# Patient Record
Sex: FEMALE | Race: BLACK OR AFRICAN AMERICAN | NOT HISPANIC OR LATINO | Employment: STUDENT | URBAN - METROPOLITAN AREA
[De-identification: names, ages, dates, MRNs, and addresses within clinical notes are randomized per-mention and may not be internally consistent; named-entity substitution may affect disease eponyms.]

---
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
## 2018-01-04 NOTE — PROGRESS NOTES
Visit:7    Diagnosis:   Encounter Diagnosis   Name Primary?    Chronic pain of both knees        Physician: Marylou Barrios MD  Treatment Orders: eval and treat  Occupation: sophomore in college at [employer]      Subjective  Pt states she feeling fine with mild pain on L knee.     Objective          Lower Extremity Strength  Right LE  Left LE    Knee extension: 5/5 Knee extension: 5/5   Knee flexion: 5/5 Knee flexion: 4+/5   Hip flexion: 5/5 Hip flexion: 5/5   Hip extension:  5/5 Hip extension: 4+/5   Hip abduction/Glut Medius: 4+/5 Hip abduction/Glut medius 4/5   Hip adduction: 5/5 Hip adduction: 5/5   Hip internal rotation: 5/5 Hip internal rotation: 5/5   Hip external rotation: 5/5 Hip external rotation:   5/5   Ankle dorsiflexion:   5/5 Ankle dorsiflexion:   4+/5   Ankle plantarflexion: 5/5 Ankle plantarflexion: 5/5       Treatment:  Patella tendon STM/friction massage x 10 min.  Pt performed there ex's for L knee strengthening, ROM, flexibility and endurance including:    McConnel taping performed to L patella for medial glide prior to exercises.   Bike 10 min  Single   QS 2x10 with 10 sec hold with patella taped for medial glide  SLR 3x10(NP)  LLR 3x10 2# (NP)  bridge B to unilat 2x10  Clamshell 3x10 GTB  Hip abd walk BTB 1 lap  Wall sit 3x30 with BTB   Shuttle B to unilat 50# 3x10  LAQ with 2#wt eccentric lowering only 3x10  Hip elevation 2 x 15 reps   Hip extension on shuttle 15# 2 x 10 reps (NP)  Ice for 10 minutes   Exercises were reviewed and pt was able to demonstrate them prior to the end of the session.       Assessment  Patient began to have inferior knee pain with QS and SLR without tape. Pt will benefit from physcial therapy services in order to maximize pain free and/or functional use of left knee. The following goals were discussed with the patient and patient is in agreement with them as to be addressed in the treatment plan.      Problem List: pain, decreased strength and inability to participate fully in vocation pursuits.      GOALS: Short Term Goals:  3 weeks  1. Pt to report decreased knee pain to 6/10 at worst..  2. Independent with HEP to increase patient's tolerance for exercise progression.    Long Term Goals: 6 weeks  1.Report decreased    L knee pain  <   / =  2  /10 with sport activities.   2.Increased MMT  for  L hip to 5/5 to increase tolerance for sport activities.   3. Pt will report improvement in overall functional abilities of LE, evidenced by improved score on knee FOTO survey.    Plan  Pt will be treated by physical therapy 2 times a week for 6 weeks for Pt Education, HEP, therapeutic exercises, neuromuscular re-education/ balance exercises, joint mobilizations, modalities prn   to achieve established goals. Marilyn may at times be seen by a PTA as part of the Rehab Team.     Cont PT for  6   weeks.

## 2018-01-05 ENCOUNTER — TELEPHONE (OUTPATIENT)
Dept: SPORTS MEDICINE | Facility: CLINIC | Age: 18
End: 2018-01-05

## 2018-01-05 NOTE — TELEPHONE ENCOUNTER
Called mom to let her know Tonya doesn't have anything until Monday at 3pm that she can be seen than just call back to schedule appointment...

## 2018-01-05 NOTE — TELEPHONE ENCOUNTER
----- Message from Deana Carrillo sent at 1/5/2018 12:11 PM CST -----  Contact: mother-Shey Salazar   Pt's mother called stating pt is having pain in her groin area; pt plays basketball. Mother would like to know if pt can possibly come in to see Tonya regarding this. please call pt at [phone]

## 2018-01-07 ENCOUNTER — OFFICE VISIT (OUTPATIENT)
Dept: URGENT CARE | Facility: CLINIC | Age: 18
End: 2018-01-07
Payer: OTHER GOVERNMENT

## 2018-01-07 VITALS
RESPIRATION RATE: 16 BRPM | HEART RATE: 90 BPM | SYSTOLIC BLOOD PRESSURE: 125 MMHG | HEIGHT: 61 IN | OXYGEN SATURATION: 99 % | BODY MASS INDEX: 19.83 KG/M2 | DIASTOLIC BLOOD PRESSURE: 78 MMHG | TEMPERATURE: 98 F | WEIGHT: 105 LBS

## 2018-01-07 DIAGNOSIS — J02.9 SORE THROAT: ICD-10-CM

## 2018-01-07 DIAGNOSIS — J06.9 ACUTE UPPER RESPIRATORY INFECTION: Primary | ICD-10-CM

## 2018-01-07 LAB
CTP QC/QA: YES
FLUAV AG NPH QL: NEGATIVE
FLUBV AG NPH QL: NEGATIVE

## 2018-01-07 PROCEDURE — 99203 OFFICE O/P NEW LOW 30 MIN: CPT | Mod: S$GLB,,, | Performed by: NURSE PRACTITIONER

## 2018-01-07 PROCEDURE — 87804 INFLUENZA ASSAY W/OPTIC: CPT | Mod: 59,QW,S$GLB, | Performed by: NURSE PRACTITIONER

## 2018-01-07 RX ORDER — BENZONATATE 100 MG/1
200 CAPSULE ORAL 3 TIMES DAILY PRN
Qty: 30 CAPSULE | Refills: 0 | Status: SHIPPED | OUTPATIENT
Start: 2018-01-07 | End: 2019-01-07

## 2018-01-07 RX ORDER — FLUTICASONE PROPIONATE 50 MCG
2 SPRAY, SUSPENSION (ML) NASAL DAILY
Qty: 1 BOTTLE | Refills: 0 | Status: SHIPPED | OUTPATIENT
Start: 2018-01-07 | End: 2019-01-07

## 2018-01-07 RX ORDER — MEDROXYPROGESTERONE ACETATE 150 MG/ML
150 INJECTION, SUSPENSION INTRAMUSCULAR
COMMUNITY
Start: 2017-11-19

## 2018-01-07 NOTE — PATIENT INSTRUCTIONS
Follow up with your doctor in a few days as needed.  Return to the urgent care or go to the ER if symptoms get worse.    Please drink plenty of fluids.  Please get plenty of rest.  Please return here or go to the Emergency Department for any concerns or worsening of condition.  If you were prescribed antibiotics, please take them to completion.  If you were given wait & see antibiotics, please wait 5-7 days before taking them, and only take them if your symptoms have worsened or not improved.  If you do begin taking the antibiotics, please take them to completion.  If you were prescribed a narcotic medication, do not drive or operate heavy equipment or machinery while taking these medications.  If you were given a steroid shot in the clinic and have also been given a prescription for a steroid such as Prednisone or a Medrol Dose Pack, please begin taking them tomorrow.  If you do not have Hypertension or any history of palpitations, it is ok to take over the counter Sudafed or Mucinex D or Allegra-D or Claritin-D or Zyrtec-D.  If you do take one of the above, it is ok to combine that with plain over the counter Mucinex or Allegra or Claritin or Zyrtec.  If for example you are taking Zyrtec -D, you can combine that with Mucinex, but not Mucinex-D.  If you are taking Mucinex-D, you can combine that with plain Allegra or Claritin or Zyrtec.   If you do have Hypertension or palpitations, it is safe to take Coricidin HBP for relief of sinus symptoms.  If not allergic, please take over the counter Tylenol (Acetaminophen) and/or Motrin (Ibuprofen) as directed for control of pain and/or fever.  Please follow up with your primary care doctor or specialist as needed.    If you  smoke, please stop smoking.      Viral Upper Respiratory Illness (Adult)  You have a viral upper respiratory illness (URI), which is another term for the common cold. This illness is contagious during the first few days. It is spread through the air  by coughing and sneezing. It may also be spread by direct contact (touching the sick person and then touching your own eyes, nose, or mouth). Frequent handwashing will decrease risk of spread. Most viral illnesses go away within 7 to 10 days with rest and simple home remedies. Sometimes the illness may last for several weeks. Antibiotics will not kill a virus, and they are generally not prescribed for this condition.    Home care  · If symptoms are severe, rest at home for the first 2 to 3 days. When you resume activity, don't let yourself get too tired.  · Avoid being exposed to cigarette smoke (yours or others).  · You may use acetaminophen or ibuprofen to control pain and fever, unless another medicine was prescribed. (Note: If you have chronic liver or kidney disease, have ever had a stomach ulcer or gastrointestinal bleeding, or are taking blood-thinning medicines, talk with your healthcare provider before using these medicines.) Aspirin should never be given to anyone under 18 years of age who is ill with a viral infection or fever. It may cause severe liver or brain damage.  · Your appetite may be poor, so a light diet is fine. Avoid dehydration by drinking 6 to 8 glasses of fluids per day (water, soft drinks, juices, tea, or soup). Extra fluids will help loosen secretions in the nose and lungs.  · Over-the-counter cold medicines will not shorten the length of time youre sick, but they may be helpful for the following symptoms: cough, sore throat, and nasal and sinus congestion. (Note: Do not use decongestants if you have high blood pressure.)  Follow-up care  Follow up with your healthcare provider, or as advised.  When to seek medical advice  Call your healthcare provider right away if any of these occur:  · Cough with lots of colored sputum (mucus)  · Severe headache; face, neck, or ear pain  · Difficulty swallowing due to throat pain  · Fever of 100.4°F (38°C)  Call 911, or get immediate medical  care  Call emergency services right away if any of these occur:  · Chest pain, shortness of breath, wheezing, or difficulty breathing  · Coughing up blood  · Inability to swallow due to throat pain  Date Last Reviewed: 9/13/2015 © 2000-2017 [employer]. [address] [place] [id number]. All rights reserved. This information is not intended as a substitute for professional medical care. Always follow your healthcare professional's instructions.

## 2018-01-07 NOTE — PROGRESS NOTES
"Subjective:       Patient ID: Marilyn Salazar is a 17 y.o. female.    Vitals:  height is 5' 1" (1.549 m) and weight is 47.6 kg (105 lb). Her temperature is 98 °F (36.7 °C). Her blood pressure is 125/78 and her pulse is 90. Her respiration is 16 and oxygen saturation is 99%.     Chief Complaint: Cough and Generalized Body Aches    Pt states family members had the flu.   Reports s/s started on Thursday. Cough, congestion and sore throat.   + subjective fevers on Friday.        Cough   This is a new problem. The current episode started in the past 7 days. The problem has been unchanged. The problem occurs constantly. The cough is non-productive. Associated symptoms include chills, a fever, headaches, postnasal drip, rhinorrhea and a sore throat. Pertinent negatives include no chest pain, rash or shortness of breath. Treatments tried: Theraflu  The treatment provided no relief.     Review of Systems   Constitution: Positive for chills, fever and malaise/fatigue.   HENT: Positive for postnasal drip, rhinorrhea and sore throat.    Eyes: Negative for blurred vision.   Cardiovascular: Negative for chest pain.   Respiratory: Positive for cough. Negative for shortness of breath.    Skin: Negative for rash.   Musculoskeletal: Negative for back pain and joint pain.   Gastrointestinal: Negative for abdominal pain, diarrhea, nausea and vomiting.   Neurological: Positive for headaches.   Psychiatric/Behavioral: The patient is not nervous/anxious.        Objective:      Physical Exam   Constitutional: She is oriented to person, place, and time. She appears well-developed and well-nourished. She is cooperative.  Non-toxic appearance. She does not appear ill. No distress.   HENT:   Head: Normocephalic and atraumatic.   Right Ear: Hearing, external ear and ear canal normal.   Left Ear: Hearing, external ear and ear canal normal.   Nose: Mucosal edema (erythema ) and rhinorrhea present. No nasal deformity. No epistaxis. Right sinus " exhibits no maxillary sinus tenderness and no frontal sinus tenderness. Left sinus exhibits no maxillary sinus tenderness and no frontal sinus tenderness.   Mouth/Throat: Uvula is midline and mucous membranes are normal. No trismus in the jaw. Normal dentition. No uvula swelling. Posterior oropharyngeal erythema present. Tonsils are 1+ on the right. Tonsils are 1+ on the left.   Unable to fully view TMs due to cerumen.   + PND     Eyes: Conjunctivae, EOM and lids are normal. Pupils are equal, round, and reactive to light. No scleral icterus.   Sclera clear bilat   Neck: Trachea normal, normal range of motion, full passive range of motion without pain and phonation normal. Neck supple.   Cardiovascular: Normal rate, regular rhythm, normal heart sounds, intact distal pulses and normal pulses.    Pulmonary/Chest: Effort normal and breath sounds normal. No respiratory distress.   Abdominal: Soft. Normal appearance and bowel sounds are normal. She exhibits no distension. There is no tenderness.   Musculoskeletal: Normal range of motion. She exhibits no edema or deformity.   Neurological: She is alert and oriented to person, place, and time. She exhibits normal muscle tone. Coordination normal.   Skin: Skin is warm, dry and intact. She is not diaphoretic. No pallor.   Psychiatric: She has a normal mood and affect. Her speech is normal and behavior is normal. Judgment and thought content normal. Cognition and memory are normal.   Nursing note and vitals reviewed.      Assessment:       Results for orders placed or performed in visit on 01/07/18   POCT Influenza A/B   Result Value Ref Range    Rapid Influenza A Ag Negative Negative    Rapid Influenza B Ag Negative Negative     Acceptable Yes          1. Acute upper respiratory infection    2. Sore throat        Plan:         Acute upper respiratory infection  -     POCT Influenza A/B  -     benzonatate (TESSALON PERLES) 100 MG capsule; Take 2 capsules (200 mg  total) by mouth 3 (three) times daily as needed for Cough.  Dispense: 30 capsule; Refill: 0  -     fluticasone (FLONASE) 50 mcg/actuation nasal spray; 2 sprays (100 mcg total) by Each Nare route once daily.  Dispense: 1 Bottle; Refill: 0    Sore throat  -     (Magic mouthwash) 1:1:1 Benadryl 12.5mg/5ml liq, aluminum & magnesium hydroxide-simehticone (Maalox), lidocaine viscous 2%; Swish and spit 5 mLs every 4 (four) hours as needed.  Dispense: 90 mL; Refill: 0      Discussed OTC medications.      Follow up with your doctor in a few days as needed.  Return to the urgent care or go to the ER if symptoms get worse.

## 2018-01-08 ENCOUNTER — OFFICE VISIT (OUTPATIENT)
Dept: SPORTS MEDICINE | Facility: CLINIC | Age: 18
End: 2018-01-08
Payer: OTHER GOVERNMENT

## 2018-01-08 VITALS
HEIGHT: 61 IN | BODY MASS INDEX: 19.63 KG/M2 | DIASTOLIC BLOOD PRESSURE: 76 MMHG | WEIGHT: 104 LBS | SYSTOLIC BLOOD PRESSURE: 110 MMHG | HEART RATE: 108 BPM

## 2018-01-08 DIAGNOSIS — S93.402D SPRAIN OF LEFT ANKLE, UNSPECIFIED LIGAMENT, SUBSEQUENT ENCOUNTER: ICD-10-CM

## 2018-01-08 DIAGNOSIS — M76.51 PATELLAR TENDONITIS OF BOTH KNEES: ICD-10-CM

## 2018-01-08 DIAGNOSIS — M24.559 HIP FLEXOR TIGHTNESS, UNSPECIFIED LATERALITY: Primary | ICD-10-CM

## 2018-01-08 DIAGNOSIS — M76.52 PATELLAR TENDONITIS OF BOTH KNEES: ICD-10-CM

## 2018-01-08 PROCEDURE — 99214 OFFICE O/P EST MOD 30 MIN: CPT | Mod: S$GLB,,, | Performed by: ORTHOPAEDIC SURGERY

## 2018-01-08 PROCEDURE — 99999 PR PBB SHADOW E&M-EST. PATIENT-LVL III: CPT | Mod: PBBFAC,,, | Performed by: ORTHOPAEDIC SURGERY

## 2018-01-08 NOTE — PROGRESS NOTES
CC: Bilateral knee pain, [employer] basketball     17 y.o. Female with a history of bilateral knee pain her pain has been present for about 3 months now.     She states that her pain has improved but that she still has pain but it is tolerable     She notes most of her pain is with stairs, running all practice with no break, and pain after sitting for prolonged period of time, also pain with lunges     She has tried ice and anti-inflammatories     - mechanical symptoms, - instability    Is affecting ADLs.     Regarding the right knee, she notes 1 month ago bumping knees with another player and she has has pain since then  Regarding the left knee, she notes no new injury       Knees improved 60%, still working on strength    DATE OF PROCEDURE: 2/5/2015  PROCEDURE PERFORMED:   left  1. Left knee proximal patellar tendon open repair    2. knee arthroscopic partial synovectomy/debridement.    3. knee arthroscopic plica excision.    4. knee open patellar tendon repair  5. knee arthroscopic partial medial meniscectomy      REVIEW OF SYSTEMS:  Constitution: Negative. Negative for chills, fever and night sweats.   HENT: Negative for congestion and headaches.    Eyes: Negative for blurred vision, left vision loss and right vision loss.   Cardiovascular: Negative for chest pain and syncope.   Respiratory: Negative for cough and shortness of breath.    Endocrine: Negative for polydipsia, polyphagia and polyuria.   Hematologic/Lymphatic: Negative for bleeding problem. Does not bruise/bleed easily.   Skin: Negative for dry skin, itching and rash.   Musculoskeletal: Negative for falls. Positive for left knee pain and  muscle weakness.   Gastrointestinal: Negative for abdominal pain and bowel incontinence.   Genitourinary: Negative for bladder incontinence and nocturia.   Neurological: Negative for disturbances in coordination, loss of balance and seizures.   Psychiatric/Behavioral: Negative for depression. The patient does not have  "insomnia.    Allergic/Immunologic: Negative for hives and persistent infections.     PAST MEDICAL HISTORY:    History reviewed. No pertinent past medical history.    PAST SURGICAL HISTORY:   Past Surgical History:   Procedure Laterality Date    ANKLE SURGERY      left ankle    KNEE ARTHROSCOPY Left 2/5/2015    Dr. Barrios        FAMILY HISTORY:   Family History   Problem Relation Age of Onset    Hypertension Mother     Heart failure Mother     Arthritis Mother     No Known Problems Sister        SOCIAL HISTORY:   Social History     Social History    Marital status: Single     Spouse name: N/A    Number of children: N/A    Years of education: N/A     Occupational History    Not on file.     Social History Main Topics    Smoking status: Never Smoker    Smokeless tobacco: Never Used    Alcohol use No    Drug use: No    Sexual activity: No     Other Topics Concern    Not on file     Social History Narrative    No narrative on file       MEDICATIONS:     Current Outpatient Prescriptions:     (Magic mouthwash) 1:1:1 Benadryl 12.5mg/5ml liq, aluminum & magnesium hydroxide-simehticone (Maalox), lidocaine viscous 2%, Swish and spit 5 mLs every 4 (four) hours as needed., Disp: 90 mL, Rfl: 0    amitriptyline (ELAVIL) 25 MG tablet, 1 pill "around" dinner every night, Disp: 30 tablet, Rfl: 6    benzonatate (TESSALON PERLES) 100 MG capsule, Take 2 capsules (200 mg total) by mouth 3 (three) times daily as needed for Cough., Disp: 30 capsule, Rfl: 0    cetirizine (ZYRTEC) 10 MG tablet, TAKE 1 TABLET(S) EVERY DAY BY ORAL ROUTE FOR 30 DAYS., Disp: , Rfl: 3    FLUARIX QUAD 9066-3959, PF, 60 mcg (15 mcg x 4)/0.5 mL Syrg, TO BE ADMINISTERED BY PHARMACIST FOR IMMUNIZATION, Disp: , Rfl: 0    fluticasone (FLONASE) 50 mcg/actuation nasal spray, SPRAY 1 SPRAY(S) EVERY DAY BY INTRANASAL ROUTE FOR 30 DAYS., Disp: , Rfl: 3    fluticasone (FLONASE) 50 mcg/actuation nasal spray, 2 sprays (100 mcg total) by Each Nare route " "once daily., Disp: 1 Bottle, Rfl: 0    ibuprofen (ADVIL,MOTRIN) 100 MG tablet, Take 100 mg by mouth every 6 (six) hours as needed for Temperature greater than., Disp: , Rfl:     medroxyPROGESTERone (DEPO-PROVERA) 150 mg/mL Syrg, Inject 150 mg into the muscle every 3 (three) months., Disp: , Rfl:     naproxen (NAPROSYN) 500 MG tablet, TAKE 1 TABLET (ORAL) EVERY 12 HOURS FOR 7 DAYS, Disp: , Rfl: 0    Current Facility-Administered Medications:     medroxyPROGESTERone (DEPO-PROVERA) injection 150 mg, 150 mg, Intramuscular, Q90 Days, Jignesh Jason MD, 150 mg at 01/04/17 1336    ALLERGIES:   No Known Allergies    VITAL SIGNS:   /76   Pulse 108   Ht 5' 1" (1.549 m)   Wt 47.2 kg (104 lb)   LMP  (LMP Unknown)   BMI 19.65 kg/m²      PHYSICAL EXAMINATION  General:  The patient is alert and oriented x 3.  Mood is pleasant.  Observation of ears, eyes and nose reveal no gross abnormalities.  No labored breathing observed.    LEFT KNEE EXAMINATION     OBSERVATION / INSPECTION   Gait:   Nonantalgic   Alignment:  Neutral   Scars:   None   Muscle atrophy: None  Effusion:  None   Warmth:  None   Discoloration:   none     TENDERNESS / CREPITUS (T / C):          T / C      T / C   Patella   + Right / -  Lateral joint line   - / -   Peripatellar medial  -  Medial joint line    - / -   Peripatellar lateral -  Medial plica   - / -   Patellar tendon + bilateral Popliteal fossa   - / -   Quad tendon   -   Gastrocnemius   -   Prepatellar Bursa - / -   Quadricep   -   Tibial tubercle  -  Thigh/hamstring  -   Pes anserine/HS -  Fibula    -   ITB   - / -  Tibia     -   Tib/fib joint  - / -  LCL    -     MFC   - / -   MCL: Proximal  -    LFC   - / -    Distal   -          ROM: (* = pain)  PASSIVE   ACTIVE    Left :   5 / 0 / 145   5 / 0 / 145     Right :    10 / 0 / 140   10 / 0 / 140    Patellofemoral examination:  See above noted areas of tenderness.   Patella position    Subluxation / dislocation: Centered           Sup. " / Inf;   Normal   Crepitus (PF):    Absent   Patellar Mobility:       Medial-lateral:   Normal    Superior-inferior:  Normal    Inferior tilt   Normal    Patellar tendon:  Normal   Lateral tilt:    Normal   J-sign:     None   Patellofemoral grind:   No pain       MENISCAL SIGNS:     Pain on terminal extension:  -  Pain on terminal flexion:  -  Tylers maneuver:  -   Squat     NT    LIGAMENT EXAMINATION:  ACL / Lachman:  normal (-1 to 2mm)    PCL-Post.  drawer: normal 0 to 2mm  MCL- Valgus:  normal 0 to 2mm  LCL- Varus:  normal 0 to 2mm  Pivot shift: normal (Equal)   Dial Test: difference c/w other side   At 30° flexion: normal (< 5°)    At 90° flexion: normal (< 5°)   Reverse Pivot Shift:   normal (Equal)     STRENGTH: (* = with pain) PAINFUL SIDE   Quadricep   5/5   Hamstrin/5    EXTREMITY NEURO-VASCULAR EXAMINATION:   Sensation:  Grossly intact to light touch all dermatomal regions.   Motor Function:  Fully intact motor function at hip, knee, foot and ankle    DTRs;  quadriceps and  achilles 2+.  No clonus and downgoing Babinski.    Vascular status:  DP and PT pulses 2+, brisk capillary refill, symmetric.     IMAGING:     X-rays including standing, weight bearing AP and flexion bilateral knees, lateral and merchant views ordered and images reviewed by me show:    No fracture or dislocation.  No bone destruction identified    Other findings:  Bridge: positive     ASSESSMENT:    Left Knee patella tendonitis, improving  Right knee contusion and patella tendonitis, improving     Anterior knee pain due to overload as a result of hip/core weakness       PLAN:   1. Continue home exercise program   2. Play basketball as tolerated  3. Follow up in as needed  4. All questions were answered, pt will contact us for questions or concerns in the interim.          CHIEF COMPLAINT: Left ankle pain.                                                          HISTORY OF PRESENT ILLNESS:  The patient is a 17 y.o. female   who presents  for evaluation of her left ankle pain.     She notes that her ankle is doing much better  She has pain with cutting as times but notes that she has been wearing her ankle brace for practice and games     Patient notes previous ankle surgery for os trigonum with     She notes rolling/twisting her ankle in 9/2017  left Foot and Ankle Exam    INSPECTION:      ALIGNMENT:  Gait:    Normal   Hindfoot  Normal    Scars:   None    Midfoot: Normal  Swelling:  None    Forefoot: Normal  Color:   Normal      Atrophy:  None    Collective Ankle-Hindfoot Alignment    Heel / Toe Walking: No difficulty   Good -plantigrade (PG), well aligned           [Fair-PG, malaligned, asymptomatic]         [Poor-Non-PG,malaligned, has sxs]     TENDERNESS:  lATERAL:    anterior:  Sinus tarsi:  None  Anteromedial joint line:  none  Syndesmosis:  none  Anterolateral joint line:   none  ATFL:   + mild  Talonavicular:    none   CFL:   none  Anterior tibialis:   none  Anterolateral gutter: none  Extensor tendons:   none  Fibula:   none  Peroneal tendons: none  POSTERIOR:  Peroneal tubercle.  None  Medial/lateral achilles:   none       Medial/lateral achilles insertion: none  MEDIAL:      Deltoid:  none CALCANEUS:  Malleolus:  none  Retrocalcaneal:   none  PTT:   none  Medial achilles:   none  Navicular:  none  Lateral achilles:   none       Calcaneal tuberosity:   none  FOOT:    Calcaneal cuboid  none MT / MT heads:  none   Navicular   none  Medial cord origin PF:  none  Cuneiforms:   none  Web space:   none  Lisfranc    none  Tarsal tunnel:   none  Base of the fifth metatarsal  none Tinels sign   neg        RANGE OF MOTION:  RIGHT/ LEFT   STRENGTH: (affected)  Ankle DF/PF:  15/45  15/45    Anterior tibialis: 5/5     Eversion/Inversion: 15/25 15/25  Posterior tibialis: 5/5   Midfoot ABD/ADD: 10/10 10/10  Gastroc-soleus: 5/5   First MTP DF/PF: 60/25 60/25  Peroneals:  5/5         EHL:   5/5   (* =  pain)     FHL:   5/5         (* = pain)      SPECIAL TESTS:   ANKLE INSTABILITY: (*pain)    Anterior drawer:   Grade 2     (C-W contralateral side)     Inversion:   30°     Eversion  10°            Collective Instability: (Ant-post and varus-valgus)     Stable        PROVOCATIVE TESTING:    Forced DF/ER: No pain at syndesmosis.    Mid-leg squeeze  No pain at syndesmosis    Forced DF:  No pain anterior joint line.      Forced PF:  No pain posterior ankle.     Forced INV:  No pain lateral    Forced EV:  No pain medial     Manzanos sign: Normal ankle plantar flexion.     Resisted peroneal No subluxation or pain    1st-2nd MT toggle No pain at Lisfranc    MT-T torque  No pain at Lisfranc     NEUROLOGIC TESTING:  All dermatomes foot, ankle and leg have normal sensation light touch  Ankle Reflexes 2+, symmetric   Negative Babinski and No Clonus    VASCULAR:  2+ pulses PT/DT with brisk capillary refill toes.    Other Findings:  Grade 2 anterior drawer       ASSESSMENT:   Left ankle sprain, improved     PLAN:  We will proceed with home exercise program  She will continue lace up brace for play  She will follow up as needed  I have discussed the nature of this problem with the patient today. We discussed both surgical and non-surgical options.           CC: bilateral  hip pain, left worse than right     HPI:   Marilyn Salazar is a pleasant 17 y.o. patient who reports to clinic with bilateral hip pain. No trauma, no [facility] sxs/instabilty.    Groin pain has been present since 12/2017  She notes that she took a break from playing over the holidays  She played a  game with friends on concrete and thinks this is when the pain started and has gradually gotten worse     Affecting ADLs and exercising      PHYSICAL EXAM /  HIP  PHYSICAL EXAMINATION  General:  The patient is alert and oriented x 3.  Mood is pleasant.  Observation of ears, eyes and nose reveal no gross abnormalities.  HEENT: NCAT, sclera nonicteric  Lungs:  Respirations are equal and unlabored..    Bilateral HIP EXAMINATION     OBSERVATION / INSPECTION  Gait:   Nonantalgic   Alignment:  Neutral   Scars:   None   Muscle atrophy: None   Effusion:  None   Warmth:  None   Discoloration:   None   Leg lengths:   Equal   Pelvis:   Level     TENDERNESS / CREPITUS (T/C):      T / C  Trochanteric bursa   - / -  Piriformis    - / -  SI joint    - / -  Psoas tendon   - / -  Rectus insertion  + / -  Adductor insertion  - / -  Pubic symphysis  - / -    ROM: (* = pain)    Flexion:    120 degrees  External rotation: 40 degrees  Internal rotation with axial load: 30 degrees  Internal rotation without axial load: 40 degrees  Abduction:  45 degrees  Adduction:   20 degrees    SPECIAL TESTS:  Pain w/ forced internal rotation (FADIR): -   Pain w/ forced external rotation (ROBERT): Absent   Circumduction test:    -  Stinchfield test:    Negative   Log roll:      Negative   Snapping hip (internal):   Negative   Sit-up pain:     Negative   Resisted sit-up pain:    Negative   Resisted sit-up with adductor contraction pain:  Negative   Step-down test:    +  Trendelenburg test:    Negative  Bridge test     +     EXTREMITY NEURO-VASCULAR EXAMINATION:   Sensation:  Grossly intact to light touch all dermatomal regions.   Motor Function:  Fully intact motor function at hip, knee, foot and ankle    DTRs;  quadriceps and  achilles 2+.  No clonus and downgoing Babinski.    Vascular status:  DP and PT pulses 2+, brisk capillary refill, symmetric.    Skin:  intact, compartments soft.    OTHER FINDINGS:  Hip flexor tightness       ASSESSMENT:    1. Bilateral hip abd/core weakness  2. Bilateral hip flexor tightness    PLAN:  1. PT for bilaterl hip abd/core strengthening, to include dry needling  2. Follow up for strength check in 3 months   3. All questions were answered, pt will contact us for questions or concerns in the interim.

## 2018-01-10 ENCOUNTER — CLINICAL SUPPORT (OUTPATIENT)
Dept: REHABILITATION | Facility: HOSPITAL | Age: 18
End: 2018-01-10
Attending: ORTHOPAEDIC SURGERY
Payer: OTHER GOVERNMENT

## 2018-01-10 DIAGNOSIS — M25.561 CHRONIC PAIN OF BOTH KNEES: ICD-10-CM

## 2018-01-10 DIAGNOSIS — M25.562 ACUTE PAIN OF LEFT KNEE: ICD-10-CM

## 2018-01-10 DIAGNOSIS — G89.29 CHRONIC PAIN OF BOTH KNEES: ICD-10-CM

## 2018-01-10 DIAGNOSIS — M76.52 PATELLAR TENDINITIS OF LEFT KNEE: ICD-10-CM

## 2018-01-10 DIAGNOSIS — M25.562 CHRONIC PAIN OF BOTH KNEES: ICD-10-CM

## 2018-01-10 PROCEDURE — 97110 THERAPEUTIC EXERCISES: CPT

## 2018-01-11 ENCOUNTER — CLINICAL SUPPORT (OUTPATIENT)
Dept: REHABILITATION | Facility: HOSPITAL | Age: 18
End: 2018-01-11
Attending: ORTHOPAEDIC SURGERY
Payer: OTHER GOVERNMENT

## 2018-01-11 DIAGNOSIS — M25.562 CHRONIC PAIN OF BOTH KNEES: ICD-10-CM

## 2018-01-11 DIAGNOSIS — G89.29 CHRONIC PAIN OF BOTH KNEES: ICD-10-CM

## 2018-01-11 DIAGNOSIS — M25.561 CHRONIC PAIN OF BOTH KNEES: ICD-10-CM

## 2018-01-11 PROCEDURE — 97110 THERAPEUTIC EXERCISES: CPT

## 2018-01-11 PROCEDURE — 97140 MANUAL THERAPY 1/> REGIONS: CPT

## 2018-01-11 NOTE — PROGRESS NOTES
Visit:8    Diagnosis:   Encounter Diagnoses   Name Primary?    Chronic pain of both knees     Patellar tendinitis of left knee     Acute pain of left knee        Physician: Marylou Barrios MD  Treatment Orders: eval and treat  Occupation: sophomore in college at [employer]      Subjective  Pt states feeling better and had an upper respiratory problems.     Objective          Lower Extremity Strength  Right LE  Left LE    Knee extension: 5/5 Knee extension: 5/5   Knee flexion: 5/5 Knee flexion: 4+/5   Hip flexion: 5/5 Hip flexion: 5/5   Hip extension:  5/5 Hip extension: 4+/5   Hip abduction/Glut Medius: 4+/5 Hip abduction/Glut medius 4/5   Hip adduction: 5/5 Hip adduction: 5/5   Hip internal rotation: 5/5 Hip internal rotation: 5/5   Hip external rotation: 5/5 Hip external rotation:   5/5   Ankle dorsiflexion:   5/5 Ankle dorsiflexion:   4+/5   Ankle plantarflexion: 5/5 Ankle plantarflexion: 5/5       Treatment:  Patella tendon STM/friction massage x 10 min.  Pt performed there ex's for L knee strengthening, ROM, flexibility and endurance including:   McConnel taping performed to L patella for medial glide prior to exercises.   Bike 10 min  Single   QS 2x10 with 10 sec hold with patella taped for medial glide  SLR 3x10  LLR 3x10 2#   bridge B to unilat 2x10  Clamshell 3x10 GTB  Hip abd walk BTB 1 lap  Wall sit 3x30 with BTB   Shuttle B to unilat 50# 3x10 (NP)  LAQ with 2#wt eccentric lowering only 3x10  Hip elevation 2 x 15 reps    Hip extension on shuttle 15# 2 x 10 reps (NP)  Ice for 10 minutes   Exercises were reviewed and pt was able to demonstrate them prior to the end of the session.       Assessment  Patient without pain today performing quad muscles working wearing tape. Pt will benefit from physcial therapy services in order to maximize pain free and/or functional use of left knee. The following goals were discussed with the patient and  patient is in agreement with them as to be addressed in the treatment plan.     Problem List: pain, decreased strength and inability to participate fully in vocation pursuits.      GOALS: Short Term Goals:  3 weeks  1. Pt to report decreased knee pain to 6/10 at worst..  2. Independent with HEP to increase patient's tolerance for exercise progression.    Long Term Goals: 6 weeks  1.Report decreased    L knee pain  <   / =  2  /10 with sport activities.   2.Increased MMT  for  L hip to 5/5 to increase tolerance for sport activities.   3. Pt will report improvement in overall functional abilities of LE, evidenced by improved score on knee FOTO survey.    Plan  Pt will be treated by physical therapy 2 times a week for 6 weeks for Pt Education, HEP, therapeutic exercises, neuromuscular re-education/ balance exercises, joint mobilizations, modalities prn   to achieve established goals. Marilyn may at times be seen by a PTA as part of the Rehab Team.     Cont PT for  6   weeks.

## 2018-01-15 ENCOUNTER — CLINICAL SUPPORT (OUTPATIENT)
Dept: REHABILITATION | Facility: HOSPITAL | Age: 18
End: 2018-01-15
Attending: ORTHOPAEDIC SURGERY
Payer: OTHER GOVERNMENT

## 2018-01-15 DIAGNOSIS — G89.29 CHRONIC PAIN OF BOTH KNEES: ICD-10-CM

## 2018-01-15 DIAGNOSIS — M25.562 CHRONIC PAIN OF BOTH KNEES: ICD-10-CM

## 2018-01-15 DIAGNOSIS — M25.561 CHRONIC PAIN OF BOTH KNEES: ICD-10-CM

## 2018-01-15 PROCEDURE — 97110 THERAPEUTIC EXERCISES: CPT

## 2018-01-15 PROCEDURE — 97140 MANUAL THERAPY 1/> REGIONS: CPT

## 2018-01-15 NOTE — PROGRESS NOTES
Visit:10    Diagnosis:   Encounter Diagnosis   Name Primary?    Chronic pain of both knees        Physician: Marylou Barrios MD  Treatment Orders: eval and treat  Occupation: sophomore in college at [employer]      Subjective  Pt states her left knee is feeling better but still having B hip flexor pain. States she is a little sore after last visit.     Objective          Lower Extremity Strength  Right LE  Left LE    Knee extension: 5/5 Knee extension: 5/5   Knee flexion: 5/5 Knee flexion: 4+/5   Hip flexion: 5/5 Hip flexion: 5/5   Hip extension:  5/5 Hip extension: 4+/5   Hip abduction/Glut Medius: 4+/5 Hip abduction/Glut medius 4/5   Hip adduction: 5/5 Hip adduction: 5/5   Hip internal rotation: 5/5 Hip internal rotation: 5/5   Hip external rotation: 5/5 Hip external rotation:   5/5   Ankle dorsiflexion:   5/5 Ankle dorsiflexion:   4+/5   Ankle plantarflexion: 5/5 Ankle plantarflexion: 5/5       Treatment:  Patella tendon STM/friction massage x 10 min (NP)  Pt performed there ex's for L knee strengthening, ROM, flexibility and endurance including:    McConnel taping performed to L patella for medial glide prior to exercises.   Bike 10 min  QS 2x10 with SLR 5/5 sec hold   SAQ 3x10 0#  LLR 3x10   bridge B to unilat 2x10  Clamshell 3x10 GTB  Monster walk backwards  BTB 1 lap  Wall sit 3x30 with BTB (NP)  Shuttle B to unilat 50# 3x10  LAQ with 2#wt eccentric lowering only 3x10  Hip elevation 2 x 15 reps (NP)  Hip extension on shuttle 15# 2 x 10 reps (NP)  Ice for 10 minutes L knee  Exercises were reviewed and pt was able to demonstrate them prior to the end of the session.     Pt gave verbal consent to dry needling Rx.   Dry needling with trigger point/manual therapy techniques was performed to B hip flexor and adductor with 2 inch needles.  All needles were removed and any changes in signs and symptoms were noted.  Dry needling was performed to  decrease inflammation, increase circulation, decrease pain and restore homeostasis.     Assessment  Patient tolerated exercises with minimal c/o pain in L knee and hip pain minimal with exercises. Pt will benefit from physcial therapy services in order to maximize pain free and/or functional use of left knee. The following goals were discussed with the patient and patient is in agreement with them as to be addressed in the treatment plan.     Problem List: pain, decreased strength and inability to participate fully in vocation pursuits.      GOALS: Short Term Goals:  3 weeks  1. Pt to report decreased knee pain to 6/10 at worst..  2. Independent with HEP to increase patient's tolerance for exercise progression.    Long Term Goals: 6 weeks  1.Report decreased    L knee pain  <   / =  2  /10 with sport activities.   2.Increased MMT  for  L hip to 5/5 to increase tolerance for sport activities.   3. Pt will report improvement in overall functional abilities of LE, evidenced by improved score on knee FOTO survey.    Plan  Pt will be treated by physical therapy 2 times a week for 6 weeks for Pt Education, HEP, therapeutic exercises, neuromuscular re-education/ balance exercises, joint mobilizations, modalities prn   to achieve established goals. Marilyn may at times be seen by a PTA as part of the Rehab Team.     Cont PT for  6   weeks.

## 2018-01-19 ENCOUNTER — CLINICAL SUPPORT (OUTPATIENT)
Dept: REHABILITATION | Facility: HOSPITAL | Age: 18
End: 2018-01-19
Attending: ORTHOPAEDIC SURGERY
Payer: OTHER GOVERNMENT

## 2018-01-19 DIAGNOSIS — M25.562 CHRONIC PAIN OF BOTH KNEES: ICD-10-CM

## 2018-01-19 DIAGNOSIS — G89.29 CHRONIC PAIN OF BOTH KNEES: ICD-10-CM

## 2018-01-19 DIAGNOSIS — M25.561 CHRONIC PAIN OF BOTH KNEES: ICD-10-CM

## 2018-01-19 PROCEDURE — 97110 THERAPEUTIC EXERCISES: CPT

## 2018-01-19 NOTE — PROGRESS NOTES
Visit:10    Diagnosis:   Encounter Diagnosis   Name Primary?    Chronic pain of both knees        Physician: Marylou Barrios MD  Treatment Orders: eval and treat  Occupation: sophomore in college at [employer]      Subjective  Pt reports she is feeling a little better but still has hip flexor pain. Denies any knee pain.     Objective          Lower Extremity Strength  Right LE  Left LE    Knee extension: 5/5 Knee extension: 5/5   Knee flexion: 5/5 Knee flexion: 4+/5   Hip flexion: 5/5 Hip flexion: 5/5   Hip extension:  5/5 Hip extension: 4+/5   Hip abduction/Glut Medius: 4+/5 Hip abduction/Glut medius 4/5   Hip adduction: 5/5 Hip adduction: 5/5   Hip internal rotation: 5/5 Hip internal rotation: 5/5   Hip external rotation: 5/5 Hip external rotation:   5/5   Ankle dorsiflexion:   5/5 Ankle dorsiflexion:   4+/5   Ankle plantarflexion: 5/5 Ankle plantarflexion: 5/5       Treatment:  Patella tendon STM/friction massage x 10 min (NP)  Pt performed there ex's for L knee strengthening, ROM, flexibility and endurance including:    McConnel taping performed to L patella for medial glide prior to exercises.   Bike 10 min  QS 2x10 with SLR 5/5 sec hold   SAQ 3x10 0#  LLR 3x10   bridge B to unilat 2x10  Clamshell 3x10 GTB  Monster walk backwards  BTB 1 lap  Wall sit 3x30 with BTB (NP)  Shuttle B to unilat 50# 3x10  LAQ with 2#wt eccentric lowering only 3x10  Hip elevation 2 x 15 reps (NP)   Hip extension on shuttle 15# 2 x 10 reps   Ice for 10 minutes L knee  Exercises were reviewed and pt was able to demonstrate them prior to the end of the session.     Pt gave verbal consent to dry needling Rx.   Dry needling with trigger point/manual therapy techniques was performed to B hip flexor and adductor with 2 inch needles.  All needles were removed and any changes in signs and symptoms were noted.  Dry needling was performed to decrease inflammation, increase  circulation, decrease pain and restore homeostasis.     Assessment  Patient tolerated exercises with minimal c/o pain in L knee and hip pain minimal with exercises. Pt will benefit from physcial therapy services in order to maximize pain free and/or functional use of left knee. The following goals were discussed with the patient and patient is in agreement with them as to be addressed in the treatment plan.     Problem List: pain, decreased strength and inability to participate fully in vocation pursuits.      GOALS: Short Term Goals:  3 weeks  1. Pt to report decreased knee pain to 6/10 at worst..  2. Independent with HEP to increase patient's tolerance for exercise progression.    Long Term Goals: 6 weeks  1.Report decreased    L knee pain  <   / =  2  /10 with sport activities.   2.Increased MMT  for  L hip to 5/5 to increase tolerance for sport activities.   3. Pt will report improvement in overall functional abilities of LE, evidenced by improved score on knee FOTO survey.    Plan  Pt will be treated by physical therapy 2 times a week for 6 weeks for Pt Education, HEP, therapeutic exercises, neuromuscular re-education/ balance exercises, joint mobilizations, modalities prn   to achieve established goals. Marilyn may at times be seen by a PTA as part of the Rehab Team.     Cont PT for  6   weeks.

## 2018-01-24 ENCOUNTER — CLINICAL SUPPORT (OUTPATIENT)
Dept: REHABILITATION | Facility: HOSPITAL | Age: 18
End: 2018-01-24
Attending: ORTHOPAEDIC SURGERY
Payer: OTHER GOVERNMENT

## 2018-01-24 DIAGNOSIS — G89.29 CHRONIC PAIN OF BOTH KNEES: ICD-10-CM

## 2018-01-24 DIAGNOSIS — M25.562 CHRONIC PAIN OF BOTH KNEES: ICD-10-CM

## 2018-01-24 DIAGNOSIS — M25.561 CHRONIC PAIN OF BOTH KNEES: ICD-10-CM

## 2018-01-24 PROCEDURE — 97110 THERAPEUTIC EXERCISES: CPT

## 2018-01-29 ENCOUNTER — CLINICAL SUPPORT (OUTPATIENT)
Dept: REHABILITATION | Facility: HOSPITAL | Age: 18
End: 2018-01-29
Attending: ORTHOPAEDIC SURGERY
Payer: OTHER GOVERNMENT

## 2018-01-29 DIAGNOSIS — G89.29 CHRONIC PAIN OF BOTH KNEES: ICD-10-CM

## 2018-01-29 DIAGNOSIS — M25.561 CHRONIC PAIN OF BOTH KNEES: ICD-10-CM

## 2018-01-29 DIAGNOSIS — M25.562 CHRONIC PAIN OF BOTH KNEES: ICD-10-CM

## 2018-01-29 PROCEDURE — 97140 MANUAL THERAPY 1/> REGIONS: CPT

## 2018-01-29 PROCEDURE — 97110 THERAPEUTIC EXERCISES: CPT

## 2018-01-29 NOTE — PROGRESS NOTES
Visit:11    Diagnosis:   Encounter Diagnosis   Name Primary?    Chronic pain of both knees        Physician: Marylou Barrios MD  Treatment Orders: eval and treat  Occupation: sophomore in college at [employer]      Subjective  Pt reports the ice helped her B hips last visit. Pt states having mild pain on B hips and L knee.     Objective          Lower Extremity Strength  Right LE  Left LE    Knee extension: 5/5 Knee extension: 5/5   Knee flexion: 5/5 Knee flexion: 4+/5   Hip flexion: 5/5 Hip flexion: 5/5   Hip extension:  5/5 Hip extension: 4+/5   Hip abduction/Glut Medius: 4+/5 Hip abduction/Glut medius 4/5   Hip adduction: 5/5 Hip adduction: 5/5   Hip internal rotation: 5/5 Hip internal rotation: 5/5   Hip external rotation: 5/5 Hip external rotation:   5/5   Ankle dorsiflexion:   5/5 Ankle dorsiflexion:   4+/5   Ankle plantarflexion: 5/5 Ankle plantarflexion: 5/5       Treatment:  Patella tendon STM/friction massage x 10 min (NP)  Pt performed there ex's for L knee strengthening, ROM, flexibility and endurance including:    McConnel taping performed to L patella for medial glide prior to exercises.   Bike 10 min  QS 2x10 with SLR 5/5 sec hold   SAQ 3x10 0#  LLR 3x10   bridge B to unilat 2x10  Clamshell 3x10 GTB  Monster walk backwards  BTB 1 lap  Wall sit 3x30 with BTB (NP)  Shuttle B to unilat 50# 3x10  LAQ with 2#wt eccentric lowering only 3x10  Hip elevation 2 x 15 reps (NP)   Hip extension on shuttle 15# 2 x 10 reps   Ice for 10 minutes L knee  Exercises were reviewed and pt was able to demonstrate them prior to the end of the session.     Pt gave verbal consent to dry needling Rx.   Dry needling with trigger point/manual therapy techniques was performed to B hip flexor and adductor with 2 inch needles.  All needles were removed and any changes in signs and symptoms were noted.  Dry needling was performed to decrease inflammation, increase  circulation, decrease pain and restore homeostasis.     Assessment  Patient tolerated exercises with minimal c/o pain in L knee and hip pain minimal with exercises. Pt will benefit from physcial therapy services in order to maximize pain free and/or functional use of left knee. The following goals were discussed with the patient and patient is in agreement with them as to be addressed in the treatment plan.     Problem List: pain, decreased strength and inability to participate fully in vocation pursuits.      GOALS: Short Term Goals:  3 weeks  1. Pt to report decreased knee pain to 6/10 at worst..  2. Independent with HEP to increase patient's tolerance for exercise progression.    Long Term Goals: 6 weeks  1.Report decreased    L knee pain  <   / =  2  /10 with sport activities.   2.Increased MMT  for  L hip to 5/5 to increase tolerance for sport activities.   3. Pt will report improvement in overall functional abilities of LE, evidenced by improved score on knee FOTO survey.    Plan  Pt will be treated by physical therapy 2 times a week for 6 weeks for Pt Education, HEP, therapeutic exercises, neuromuscular re-education/ balance exercises, joint mobilizations, modalities prn   to achieve established goals. Marilyn may at times be seen by a PTA as part of the Rehab Team.     Cont PT for  6   weeks.

## 2018-02-12 ENCOUNTER — CLINICAL SUPPORT (OUTPATIENT)
Dept: REHABILITATION | Facility: HOSPITAL | Age: 18
End: 2018-02-12
Attending: ORTHOPAEDIC SURGERY
Payer: OTHER GOVERNMENT

## 2018-02-12 DIAGNOSIS — M25.561 CHRONIC PAIN OF BOTH KNEES: ICD-10-CM

## 2018-02-12 DIAGNOSIS — M25.562 CHRONIC PAIN OF BOTH KNEES: ICD-10-CM

## 2018-02-12 DIAGNOSIS — G89.29 CHRONIC PAIN OF BOTH KNEES: ICD-10-CM

## 2018-02-12 PROCEDURE — 97110 THERAPEUTIC EXERCISES: CPT

## 2018-02-14 NOTE — PROGRESS NOTES
Visit:12    Diagnosis:   Encounter Diagnosis   Name Primary?   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     Objective          Lower Extremity Strength  Right LE  Left LE    Knee extension: 5/5 Knee extension: 5/5   Knee flexion: 5/5 Knee flexion: 4+/5   Hip flexion: 5/5 Hip flexion: 5/5   Hip extension:  5/5 Hip extension: 4+/5   Hip abduction/Glut Medius: 4+/5 Hip abduction/Glut medius 4/5   Hip adduction: 5/5 Hip adduction: 5/5   Hip internal rotation: 5/5 Hip internal rotation: 5/5   Hip external rotation: 5/5 Hip external rotation:   5/5   Ankle dorsiflexion:   5/5 Ankle dorsiflexion:   4+/5   Ankle plantarflexion: 5/5 Ankle plantarflexion: 5/5       Treatment:  Patella tendon STM/friction massage x 10 min (NP)  Pt performed there ex's for L knee strengthening, ROM, flexibility and endurance including:   McConnel taping performed to L patella for medial glide prior to exercises. (NP)  Bike 10 min (NP)   Pt received manual soft tissue mobs on psoas muscles/hip flexor's muscles followed by quad stretch several trials   QS 2x10 with SLR 5/5 sec hold   SAQ 3x10 0#  LLR 3x10 B  Backwards lunge with opposite arm row with hip ext 3x10 B (NP)  bridge B  2x10   Clamshell 3x10 GTB  Monster walk backwards  BTB 1 lap  Shuttle hip extension 15# 2 x 10 reps   LAQ with 2#wt eccentric lowering only 3x10 (NP)  Hip elevation 2 x 15 reps (NP)   Hip extension on shuttle 15# 2 x 10 reps (NP)  Ice for 10 minutes B hip knee  Exercises were reviewed and pt was able to demonstrate them prior to the end of the session.     Pt gave verbal consent to dry needling Rx.   Dry needling with trigger point/manual therapy techniques was performed  to B hip flexor and adductor with 2 inch needles. (NP)  All needles were removed and any changes in signs and symptoms were noted.  Dry needling was performed to decrease inflammation, increase circulation, decrease pain and restore homeostasis.     Assessment   Patient with good tolerance to PT today, some exercises not performed today secondary to Pt's increase of pain. Pt will benefit from physcial therapy services in order to maximize pain free and/or functional use of left knee. The following goals were discussed with the patient and patient is in agreement with them as to be addressed in the treatment plan.     Problem List: pain, decreased strength and inability to participate fully in vocation pursuits.      GOALS: Short Term Goals:  3 weeks  1. Pt to report decreased knee pain to 6/10 at worst..  2. Independent with HEP to increase patient's tolerance for exercise progression.    Long Term Goals: 6 weeks  1.Report decreased    L knee pain  <   / =  2  /10 with sport activities.   2.Increased MMT  for  L hip to 5/5 to increase tolerance for sport activities.   3. Pt will report improvement in overall functional abilities of LE, evidenced by improved score on knee FOTO survey.    Plan  Pt will be treated by physical therapy 2 times a week for 6 weeks for Pt Education, HEP, therapeutic exercises, neuromuscular re-education/ balance exercises, joint mobilizations, modalities prn   to achieve established goals. Marilyn may at times be seen by a PTA as part of the Rehab Team.     Cont PT for  6   weeks.

## 2018-02-19 ENCOUNTER — CLINICAL SUPPORT (OUTPATIENT)
Dept: REHABILITATION | Facility: HOSPITAL | Age: 18
End: 2018-02-19
Attending: ORTHOPAEDIC SURGERY
Payer: OTHER GOVERNMENT

## 2018-02-19 DIAGNOSIS — G89.29 CHRONIC PAIN OF BOTH KNEES: ICD-10-CM

## 2018-02-19 DIAGNOSIS — M25.561 CHRONIC PAIN OF BOTH KNEES: ICD-10-CM

## 2018-02-19 DIAGNOSIS — M25.562 CHRONIC PAIN OF BOTH KNEES: ICD-10-CM

## 2018-02-19 PROCEDURE — 97140 MANUAL THERAPY 1/> REGIONS: CPT

## 2018-02-19 PROCEDURE — 97110 THERAPEUTIC EXERCISES: CPT

## 2018-02-20 NOTE — PROGRESS NOTES
Visit:13    Diagnosis:   Encounter Diagnosis   Name Primary?    Chronic pain of both knees        Physician: Marylou Barrios MD  Treatment Orders: eval and treat  Occupation: sophomore in college at [employer]      Subjective  Pt reports feeling better today. Pt reports that she played this past weekend.     Objective          Lower Extremity Strength  Right LE  Left LE    Knee extension: 5/5 Knee extension: 5/5   Knee flexion: 5/5 Knee flexion: 4+/5   Hip flexion: 5/5 Hip flexion: 5/5   Hip extension:  5/5 Hip extension: 4+/5   Hip abduction/Glut Medius: 4+/5 Hip abduction/Glut medius 4/5   Hip adduction: 5/5 Hip adduction: 5/5   Hip internal rotation: 5/5 Hip internal rotation: 5/5   Hip external rotation: 5/5 Hip external rotation:   5/5   Ankle dorsiflexion:   5/5 Ankle dorsiflexion:   4+/5   Ankle plantarflexion: 5/5 Ankle plantarflexion: 5/5       Treatment:  Patella tendon STM/friction massage x 10 min (NP)  Pt performed there ex's for L knee strengthening, ROM, flexibility and endurance including:   McConnel taping performed to L patella for medial glide prior to exercises. (NP)  Bike 10 min (NP)   Pt received manual soft tissue mobs on psoas muscles/hip flexor's muscles followed by quad stretch several trials   QS 2x10 with SLR 5/5 sec hold   SAQ 3x10 0#  LLR 3x10 B  Backwards lunge with opposite arm row with hip ext 3x10 B (NP)  bridge B  2x10   Clamshell 3x10 GTB  Monster walk backwards  BTB 1 lap  Shuttle hip extension 15# 2 x 10 reps   LAQ with 2#wt eccentric lowering only 3x10 (NP)  Hip elevation 2 x 15 reps (NP)   Hip extension on shuttle 15# 2 x 10 reps (NP)  Ice for 10 minutes B hip knee  Exercises were reviewed and pt was able to demonstrate them prior to the end of the session.     Pt gave verbal consent to dry needling Rx.   Dry needling with trigger point/manual therapy techniques was performed to B hip flexor and adductor with  2 inch needles. Done on 02/19/18 by PT  All needles were removed and any changes in signs and symptoms were noted.  Dry needling was performed to decrease inflammation, increase circulation, decrease pain and restore homeostasis.     Assessment   Patient with good tolerance to PT today, some exercises not performed today secondary to Pt's increase of pain. Pt will benefit from physcial therapy services in order to maximize pain free and/or functional use of left knee. The following goals were discussed with the patient and patient is in agreement with them as to be addressed in the treatment plan.     Problem List: pain, decreased strength and inability to participate fully in vocation pursuits.      GOALS: Short Term Goals:  3 weeks  1. Pt to report decreased knee pain to 6/10 at worst..  2. Independent with HEP to increase patient's tolerance for exercise progression.    Long Term Goals: 6 weeks  1.Report decreased    L knee pain  <   / =  2  /10 with sport activities.   2.Increased MMT  for  L hip to 5/5 to increase tolerance for sport activities.   3. Pt will report improvement in overall functional abilities of LE, evidenced by improved score on knee FOTO survey.    Plan  Pt will be treated by physical therapy 2 times a week for 6 weeks for Pt Education, HEP, therapeutic exercises, neuromuscular re-education/ balance exercises, joint mobilizations, modalities prn   to achieve established goals. Marilyn may at times be seen by a PTA as part of the Rehab Team.     Cont PT for  6   weeks.

## 2018-02-25 ENCOUNTER — PATIENT MESSAGE (OUTPATIENT)
Dept: SPORTS MEDICINE | Facility: CLINIC | Age: 18
End: 2018-02-25

## 2018-02-26 NOTE — PROGRESS NOTES
Pt gave verbal consent to dry needling Rx.   Dry needling with trigger point/manual therapy techniques was performed to B hip flexor and adductor with 2 inch needles.  All needles were removed and any changes in signs and symptoms were noted.  Dry needling was performed to decrease inflammation, increase circulation, decrease pain and restore homeostasis.

## 2018-03-02 ENCOUNTER — PATIENT MESSAGE (OUTPATIENT)
Dept: SPORTS MEDICINE | Facility: CLINIC | Age: 18
End: 2018-03-02

## 2018-03-05 ENCOUNTER — TELEPHONE (OUTPATIENT)
Dept: SPORTS MEDICINE | Facility: CLINIC | Age: 18
End: 2018-03-05
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
## 2018-03-08 ENCOUNTER — TELEPHONE (OUTPATIENT)
Dept: SPORTS MEDICINE | Facility: CLINIC | Age: 18
End: 2018-03-08

## 2018-03-08 NOTE — TELEPHONE ENCOUNTER
----- Message from Angela Escobar MA sent at 3/7/2018  2:24 PM CST -----  Contact: Ms. Salazar/mom/home       ----- Message -----  From: Jeremiah Izaguirre  Sent: 3/7/2018   1:27 PM  To: Sophie [facility]ick[facility] Staff    Ms. Caston called in stating that she has the info that you requested.

## 2018-03-12 ENCOUNTER — CLINICAL SUPPORT (OUTPATIENT)
Dept: REHABILITATION | Facility: HOSPITAL | Age: 18
End: 2018-03-12
Attending: ORTHOPAEDIC SURGERY
Payer: OTHER GOVERNMENT

## 2018-03-12 DIAGNOSIS — M25.561 CHRONIC PAIN OF BOTH KNEES: ICD-10-CM

## 2018-03-12 DIAGNOSIS — M25.562 CHRONIC PAIN OF BOTH KNEES: ICD-10-CM

## 2018-03-12 DIAGNOSIS — G89.29 CHRONIC PAIN OF BOTH KNEES: ICD-10-CM

## 2018-03-12 PROCEDURE — 97140 MANUAL THERAPY 1/> REGIONS: CPT

## 2018-03-12 PROCEDURE — 97110 THERAPEUTIC EXERCISES: CPT
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
## 2018-03-19 ENCOUNTER — CLINICAL SUPPORT (OUTPATIENT)
Dept: REHABILITATION | Facility: HOSPITAL | Age: 18
End: 2018-03-19
Attending: ORTHOPAEDIC SURGERY
Payer: OTHER GOVERNMENT

## 2018-03-19 DIAGNOSIS — S39.92XA INJURY OF BACK: Primary | ICD-10-CM

## 2018-03-19 DIAGNOSIS — G89.29 CHRONIC PAIN OF BOTH KNEES: ICD-10-CM

## 2018-03-19 DIAGNOSIS — M25.561 CHRONIC PAIN OF BOTH KNEES: ICD-10-CM

## 2018-03-19 DIAGNOSIS — M25.562 CHRONIC PAIN OF BOTH KNEES: ICD-10-CM

## 2018-03-19 PROCEDURE — 97140 MANUAL THERAPY 1/> REGIONS: CPT

## 2018-03-19 PROCEDURE — 97110 THERAPEUTIC EXERCISES: CPT

## 2018-03-19 NOTE — PROGRESS NOTES
Visit:12    Diagnosis:   Encounter Diagnosis   Name Primary?    Chronic pain of both knees        Physician: Marylou Barrios MD  Treatment Orders: eval and treat  Occupation: sophomore in college at [employer]      Subjective  Patient states she is feeling better with less hip pain but still has mild c/o pain in Lumbar spine.      Objective     Posture: pelvis level, ASIS and PSIS even.     Lower Extremity Strength  Right LE  Left LE    Knee extension: 5/5 Knee extension: 5/5   Knee flexion: 5/5 Knee flexion: 4+/5   Hip flexion: 5/5 Hip flexion: 5/5   Hip extension:  5/5 Hip extension: 4+/5   Hip abduction/Glut Medius: 4+/5 Hip abduction/Glut medius 4/5   Hip adduction: 5/5 Hip adduction: 5/5   Hip internal rotation: 5/5 Hip internal rotation: 5/5   Hip external rotation: 5/5 Hip external rotation:   5/5   Ankle dorsiflexion:   5/5 Ankle dorsiflexion:   4+/5   Ankle plantarflexion: 5/5 Ankle plantarflexion: 5/5       Treatment:  Patella tendon STM/friction massage x 10 min (NP)  Pt performed there ex's for L knee strengthening, ROM, flexibility and endurance including:   Bike 10 min  QS 2x10 with SLR 5/5 sec hold (NP)  LTR's 2'  LLR 3x10 B  bridge B 2x10  Clamshell 3x10 GTB  Reverse clams 1.5# 3x10 B  Monster walk backwards  BTB 1 lap  Wall sit 3x30 with BTB (NP)  Shuttle B to unilat 50# 3x10 (NP)  LAQ with 2#wt eccentric lowering only 3x10 (NP)  Hip elevation 2 x 15 reps with ball on ball  Hip extension on shuttle 15# 2 x 10 reps (NP)  Ice for 10 minutes L knee (NP)  Exercises were reviewed and pt was able to demonstrate them prior to the end of the session.     Pt gave verbal consent to dry needling Rx. (NP today)  Dry needling with trigger point/manual therapy techniques was performed to B hip flexor and adductor with 2 inch needles.  All needles were removed and any changes in signs and symptoms were noted.  Dry needling was performed to  decrease inflammation, increase circulation, decrease pain and restore homeostasis.     Assessment  Patient tolerating more exercises with no reports of increased hip or back pain. Pt will benefit from physcial therapy services in order to maximize pain free and/or functional use of left knee. The following goals were discussed with the patient and patient is in agreement with them as to be addressed in the treatment plan.     Problem List: pain, decreased strength and inability to participate fully in vocation pursuits.      GOALS: Short Term Goals:  3 weeks  1. Pt to report decreased knee pain to 6/10 at worst..  2. Independent with HEP to increase patient's tolerance for exercise progression.    Long Term Goals: 6 weeks  1.Report decreased    L knee pain  <   / =  2  /10 with sport activities.   2.Increased MMT  for  L hip to 5/5 to increase tolerance for sport activities.   3. Pt will report improvement in overall functional abilities of LE, evidenced by improved score on knee FOTO survey.    Plan  Pt will be treated by physical therapy 2 times a week for 6 weeks for Pt Education, HEP, therapeutic exercises, neuromuscular re-education/ balance exercises, joint mobilizations, modalities prn   to achieve established goals. Marilyn may at times be seen by a PTA as part of the Rehab Team.     Cont PT for  6   weeks.

## 2018-04-09 ENCOUNTER — OFFICE VISIT (OUTPATIENT)
Dept: SPORTS MEDICINE | Facility: CLINIC | Age: 18
End: 2018-04-09
Payer: OTHER GOVERNMENT

## 2018-04-09 VITALS
HEART RATE: 95 BPM | BODY MASS INDEX: 20.61 KG/M2 | WEIGHT: 112 LBS | HEIGHT: 62 IN | DIASTOLIC BLOOD PRESSURE: 84 MMHG | SYSTOLIC BLOOD PRESSURE: 131 MMHG

## 2018-04-09 DIAGNOSIS — M76.892 HIP FLEXOR TENDONITIS, LEFT: ICD-10-CM

## 2018-04-09 DIAGNOSIS — M76.891 HIP FLEXOR TENDINITIS, RIGHT: Primary | ICD-10-CM

## 2018-04-09 PROCEDURE — 99999 PR PBB SHADOW E&M-EST. PATIENT-LVL III: CPT | Mod: PBBFAC,,, | Performed by: ORTHOPAEDIC SURGERY

## 2018-04-09 PROCEDURE — 99214 OFFICE O/P EST MOD 30 MIN: CPT | Mod: S$GLB,,, | Performed by: ORTHOPAEDIC SURGERY
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
## 2019-07-09 ENCOUNTER — OFFICE VISIT (OUTPATIENT)
Dept: INTERNAL MEDICINE | Facility: CLINIC | Age: 19
End: 2019-07-09
Payer: OTHER GOVERNMENT

## 2019-07-09 ENCOUNTER — LAB VISIT (OUTPATIENT)
Dept: LAB | Facility: HOSPITAL | Age: 19
End: 2019-07-09
Attending: FAMILY MEDICINE
Payer: OTHER GOVERNMENT

## 2019-07-09 VITALS
OXYGEN SATURATION: 98 % | BODY MASS INDEX: 24.78 KG/M2 | TEMPERATURE: 98 F | HEIGHT: 62 IN | DIASTOLIC BLOOD PRESSURE: 72 MMHG | WEIGHT: 134.69 LBS | HEART RATE: 91 BPM | SYSTOLIC BLOOD PRESSURE: 112 MMHG

## 2019-07-09 DIAGNOSIS — E55.9 VITAMIN D DEFICIENCY: ICD-10-CM

## 2019-07-09 DIAGNOSIS — Z23 NEED FOR VACCINATION: ICD-10-CM

## 2019-07-09 DIAGNOSIS — Z11.3 SCREENING FOR STD (SEXUALLY TRANSMITTED DISEASE): ICD-10-CM

## 2019-07-09 DIAGNOSIS — R09.1 PLEURITIS: Primary | ICD-10-CM

## 2019-07-09 DIAGNOSIS — Z13.220 SCREENING, LIPID: ICD-10-CM

## 2019-07-09 DIAGNOSIS — Z00.00 WELLNESS EXAMINATION: ICD-10-CM
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
## 2019-07-09 LAB
25(OH)D3+25(OH)D2 SERPL-MCNC: 11 NG/ML (ref 30–96)
BASOPHILS # BLD AUTO: 0.03 K/UL (ref 0–0.2)
BASOPHILS NFR BLD: 0.5 % (ref 0–1.9)
CHOLEST SERPL-MCNC: 128 MG/DL (ref 120–199)
CHOLEST/HDLC SERPL: 3.1 {RATIO} (ref 2–5)
DIFFERENTIAL METHOD: ABNORMAL
EOSINOPHIL # BLD AUTO: 0.3 K/UL (ref 0–0.5)
EOSINOPHIL NFR BLD: 5.6 % (ref 0–8)
ERYTHROCYTE [DISTWIDTH] IN BLOOD BY AUTOMATED COUNT: 13.1 % (ref 11.5–14.5)
HCT VFR BLD AUTO: 41.9 % (ref 37–48.5)
HDLC SERPL-MCNC: 41 MG/DL (ref 40–75)
HDLC SERPL: 32 % (ref 20–50)
HGB BLD-MCNC: 13.3 G/DL (ref 12–16)
IMM GRANULOCYTES # BLD AUTO: 0.01 K/UL (ref 0–0.04)
IMM GRANULOCYTES NFR BLD AUTO: 0.2 % (ref 0–0.5)
LDLC SERPL CALC-MCNC: 80.6 MG/DL (ref 63–159)
LYMPHOCYTES # BLD AUTO: 2 K/UL (ref 1–4.8)
LYMPHOCYTES NFR BLD: 36 % (ref 18–48)
MCH RBC QN AUTO: 28.5 PG (ref 27–31)
MCHC RBC AUTO-ENTMCNC: 31.7 G/DL (ref 32–36)
MCV RBC AUTO: 90 FL (ref 82–98)
MONOCYTES # BLD AUTO: 0.4 K/UL (ref 0.3–1)
MONOCYTES NFR BLD: 7.4 % (ref 4–15)
NEUTROPHILS # BLD AUTO: 2.8 K/UL (ref 1.8–7.7)
NEUTROPHILS NFR BLD: 50.3 % (ref 38–73)
NONHDLC SERPL-MCNC: 87 MG/DL
NRBC BLD-RTO: 0 /100 WBC
PLATELET # BLD AUTO: 261 K/UL (ref 150–350)
PMV BLD AUTO: 12.1 FL (ref 9.2–12.9)
RBC # BLD AUTO: 4.66 M/UL (ref 4–5.4)
TRIGL SERPL-MCNC: 32 MG/DL (ref 30–150)
WBC # BLD AUTO: 5.55 K/UL (ref 3.9–12.7)

## 2019-07-09 PROCEDURE — 90715 TDAP VACCINE GREATER THAN OR EQUAL TO 7YO IM: ICD-10-PCS | Mod: S$GLB,,, | Performed by: FAMILY MEDICINE

## 2019-07-09 PROCEDURE — 99213 OFFICE O/P EST LOW 20 MIN: CPT | Mod: S$GLB,,, | Performed by: FAMILY MEDICINE

## 2019-07-09 PROCEDURE — 99395 PR PREVENTIVE VISIT,EST,18-39: ICD-10-PCS | Mod: 25,1E,GY,S$GLB | Performed by: FAMILY MEDICINE

## 2019-07-09 PROCEDURE — 80061 LIPID PANEL: CPT

## 2019-07-09 PROCEDURE — 82306 VITAMIN D 25 HYDROXY: CPT

## 2019-07-09 PROCEDURE — 86703 HIV-1/HIV-2 1 RESULT ANTBDY: CPT

## 2019-07-09 PROCEDURE — 99999 PR PBB SHADOW E&M-EST. PATIENT-LVL III: CPT | Mod: PBBFAC,,, | Performed by: FAMILY MEDICINE

## 2019-07-09 PROCEDURE — 90471 IMMUNIZATION ADMIN: CPT | Mod: S$GLB,,, | Performed by: FAMILY MEDICINE

## 2019-07-09 PROCEDURE — 99395 PREV VISIT EST AGE 18-39: CPT | Mod: 25,1E,GY,S$GLB | Performed by: FAMILY MEDICINE

## 2019-07-09 PROCEDURE — 99213 PR OFFICE/OUTPT VISIT, EST, LEVL III, 20-29 MIN: ICD-10-PCS | Mod: S$GLB,,, | Performed by: FAMILY MEDICINE

## 2019-07-09 PROCEDURE — 99999 PR PBB SHADOW E&M-EST. PATIENT-LVL III: ICD-10-PCS | Mod: PBBFAC,,, | Performed by: FAMILY MEDICINE

## 2019-07-09 PROCEDURE — 86592 SYPHILIS TEST NON-TREP QUAL: CPT

## 2019-07-09 PROCEDURE — 90471 TDAP VACCINE GREATER THAN OR EQUAL TO 7YO IM: ICD-10-PCS | Mod: S$GLB,,, | Performed by: FAMILY MEDICINE

## 2019-07-09 PROCEDURE — 36415 COLL VENOUS BLD VENIPUNCTURE: CPT

## 2019-07-09 PROCEDURE — 90715 TDAP VACCINE 7 YRS/> IM: CPT | Mod: S$GLB,,, | Performed by: FAMILY MEDICINE

## 2019-07-09 PROCEDURE — 85025 COMPLETE CBC W/AUTO DIFF WBC: CPT

## 2019-07-09 RX ORDER — PREDNISONE 20 MG/1
TABLET ORAL
Qty: 11 TABLET | Refills: 0 | Status: SHIPPED | OUTPATIENT
Start: 2019-07-09 | End: 2019-11-21

## 2019-07-09 NOTE — PROGRESS NOTES
Subjective:       Patient ID: Marilyn Salazar is a 18 y.o. female.    Chief Complaint: chest pain    17 yo female here with 1 week history of pain in her chest with deep inspiration. Seems to bother her most at night--feels like a sharp pain with inspiration. Relieved with rest. No positional component. Sometimes can be provoked by aerobic exertion (bike riding) but sometimes does not happen with exertion. Mild URI week prior to symptoms.     Also due for preventive health maintenance visit.     does not have any pertinent problems on file.  History reviewed. No pertinent past medical history.  Past Surgical History:   Procedure Laterality Date    ANKLE SURGERY      left ankle    CHONDROPLASTY-KNEE Left 2/5/2015    [facility]    EXCISION-PLICA Left 2/5/2015    [facility]    KNEE ARTHROSCOPY Left 2/5/2015    Dr. Barrios     MENISCECTOMY Left 2/5/2015    [facility]    REPAIR-MENISCUS Left 2/5/2015    [facility]    REPAIR-TENDON-PATELLA Left 2/5/2015    [facility]    SYNOVECTOMY-KNEE Left 2/5/2015    [facility]     Family History   Problem Relation Age of Onset    Hypertension Mother     Heart failure Mother     Arthritis Mother     No Known Problems Sister      Social History     Socioeconomic History    Marital status: Single     Spouse name: Not on file    Number of children: Not on file    Years of education: Not on file    Highest education level: Not on file   Occupational History    Not on file   Social Needs    Financial resource strain: Not on file    Food insecurity:     Worry: Not on file     Inability: Not on file    Transportation needs:     Medical: Not on file     Non-medical: Not on file   Tobacco Use    Smoking status: Never Smoker    Smokeless tobacco: Never Used   Substance and Sexual Activity    Alcohol use: No     Drug use: No    Sexual activity: Never     Birth control/protection: None   Lifestyle    Physical activity:     Days per week: Not on file     Minutes per session: Not on file    Stress: Not on file   Relationships    Social connections:     Talks on phone: Not on file     Gets together: Not on file     Attends [employer] service: Not on file     Active member of club or organization: Not on file     Attends meetings of clubs or organizations: Not on file     Relationship status: Not on file   Other Topics Concern    Not on file   Social History Narrative    Not on file     Review of Systems   Constitutional: Negative for fatigue and unexpected weight change.   HENT: Negative for dental problem, hearing loss and sore throat.    Eyes: Negative for pain and visual disturbance.   Respiratory: Negative for cough and shortness of breath.    Cardiovascular: Positive for chest pain. Negative for palpitations.   Gastrointestinal: Negative for abdominal pain, constipation and diarrhea.   Genitourinary: Negative for difficulty urinating, dysuria and vaginal discharge.   Musculoskeletal: Negative for arthralgias and myalgias.   Skin: Negative for rash and wound.   Neurological: Negative for light-headedness and headaches.   Hematological: Negative.        Objective:     Vitals:    07/09/19 0808   BP: 112/72   Pulse: 91   Temp: 98.4 °F (36.9 °C)        Physical Exam   Constitutional: She is oriented to person, place, and time. She appears well-developed and well-nourished.   HENT:   Head: Normocephalic and atraumatic.   Eyes: Pupils are equal, round, and reactive to light. EOM are normal.   Neck: Normal range of motion. Neck supple.   Cardiovascular: Normal rate, regular rhythm, normal heart sounds and intact distal pulses. Exam reveals no friction rub.   No murmur heard.  Pulmonary/Chest: Effort normal and breath sounds normal. She exhibits no tenderness.   Abdominal: Soft. Bowel sounds are normal.   Musculoskeletal:  Normal range of motion. She exhibits no edema or deformity.   Neurological: She is alert and oriented to person, place, and time.   Skin: Skin is warm and dry.   Psychiatric: She has a normal mood and affect. Her behavior is normal.   Nursing note and vitals reviewed.      Assessment:       1. Pleuritis    2. Wellness examination    3. Vitamin D deficiency    4. Screening, lipid    5. Need for vaccination    6. Screening for STD (sexually transmitted disease)        Plan:           Problem List Items Addressed This Visit     None      Visit Diagnoses     Pleuritis    -  Primary    Relevant Medications    predniSONE (DELTASONE) 20 MG tablet    Wellness examination        Relevant Orders    CBC auto differential    Vitamin D deficiency        Relevant Orders    Vitamin D    Screening, lipid        Relevant Orders    Lipid panel    Need for vaccination        Relevant Orders    Tdap Vaccine    Screening for STD (sexually transmitted disease)        Relevant Orders    C. trachomatis/N. gonorrhoeae by AMP DNA    HIV 1/2 Ag/Ab (4th Gen)    RPR      Counseled on importance of adequate sleep, regular exercise, stress management, and healthy diet. Up to date on preventive health measures.

## 2019-07-09 NOTE — PATIENT INSTRUCTIONS
Pleurisy  You have pain in your chest. Your healthcare provider has told you that you have pleurisy, or pleuritis. Pleurisy is swelling (inflammation) of the pleura. The pleura are two layers of thin smooth tissue that surround the lungs and line the chest.  What are the symptoms of pleurisy?     Pleurisy is inflammation of the pleura. The pleura cover the lungs and line the chest.   Pleurisy usually causes sharp chest pain. It is usually worse when you take a deep breath, cough, or sneeze.  What causes pleurisy?  Many things can cause pleurisy. A common cause is a viral infection like the flu or pneumonia. Serious lung problems that can cause it include:  · A blood clot in the lung (pulmonary embolism)  · Air between the pleura (pneumothorax)  Serious heart problems that can cause pleurisy include:  · Heart attack  · Inflammation of the covering of the heart (pericarditis)  How is pleurisy diagnosed?  Your healthcare provider examines you and asks you about your symptoms and health history. He or she will first check you for the serious causes of chest pain. You may have:  · Lab tests  · Imaging tests such as chest X-ray, CT scan, or ultrasound  · EKG  How is pleurisy treated?  Treatment depends on what is causing the pleurisy. Serious conditions are treated in the hospital. You may need medicines to decrease the inflammation and pain.     Call 911  Call 911 if any of these occur:  · Trouble breathing  · Chest pain that gets worse  Call your healthcare provider  Call your healthcare provider right away if you have:  · A fever of 100.4°F (38°C) or higher, or as directed by your healthcare provider   Date Last Reviewed: 11/1/2016  © 0158-3302 [employer]. [address] [place] [address], [place] [address]. All rights reserved. This information is not intended as a substitute for professional medical care. Always follow your healthcare professional's instructions.

## 2019-07-10 LAB
HIV 1+2 AB+HIV1 P24 AG SERPL QL IA: NEGATIVE
RPR SER QL: NORMAL

## 2019-07-22 ENCOUNTER — OFFICE VISIT (OUTPATIENT)
Dept: URGENT CARE | Facility: CLINIC | Age: 19
End: 2019-07-22
Payer: OTHER GOVERNMENT

## 2019-07-22 VITALS
HEART RATE: 91 BPM | SYSTOLIC BLOOD PRESSURE: 126 MMHG | WEIGHT: 134 LBS | HEIGHT: 61 IN | BODY MASS INDEX: 25.3 KG/M2 | TEMPERATURE: 100 F | DIASTOLIC BLOOD PRESSURE: 84 MMHG | OXYGEN SATURATION: 97 % | RESPIRATION RATE: 20 BRPM

## 2019-07-22 DIAGNOSIS — R07.89 CHEST WALL PAIN: ICD-10-CM

## 2019-07-22 DIAGNOSIS — R09.82 PND (POST-NASAL DRIP): ICD-10-CM

## 2019-07-22 DIAGNOSIS — M94.0 COSTOCHONDRITIS: Primary | ICD-10-CM

## 2019-07-22 DIAGNOSIS — R05.9 COUGH: ICD-10-CM

## 2019-07-22 PROCEDURE — 99214 OFFICE O/P EST MOD 30 MIN: CPT | Mod: S$GLB,,, | Performed by: PHYSICIAN ASSISTANT

## 2019-07-22 PROCEDURE — 71046 X-RAY EXAM CHEST 2 VIEWS: CPT | Mod: S$GLB,,, | Performed by: RADIOLOGY

## 2019-07-22 PROCEDURE — 71046 XR CHEST PA AND LATERAL: ICD-10-PCS | Mod: S$GLB,,, | Performed by: RADIOLOGY

## 2019-07-22 PROCEDURE — 99214 PR OFFICE/OUTPT VISIT, EST, LEVL IV, 30-39 MIN: ICD-10-PCS | Mod: S$GLB,,, | Performed by: PHYSICIAN ASSISTANT

## 2019-07-22 RX ORDER — IBUPROFEN 600 MG/1
600 TABLET ORAL EVERY 6 HOURS PRN
Qty: 30 TABLET | Refills: 0 | Status: SHIPPED | OUTPATIENT
Start: 2019-07-22 | End: 2020-07-21

## 2019-07-22 RX ORDER — PROMETHAZINE HYDROCHLORIDE AND DEXTROMETHORPHAN HYDROBROMIDE 6.25; 15 MG/5ML; MG/5ML
5 SYRUP ORAL 3 TIMES DAILY PRN
Qty: 180 ML | Refills: 0 | Status: SHIPPED | OUTPATIENT
Start: 2019-07-22 | End: 2019-08-01

## 2019-07-22 NOTE — PROGRESS NOTES
"Subjective:       Patient ID: Marilyn Salazar is a 18 y.o. female.    Vitals:  height is 5' 1" (1.549 m) and weight is 60.8 kg (134 lb). Her tympanic temperature is 100 °F (37.8 °C). Her blood pressure is 126/84 and her pulse is 91. Her respiration is 20 and oxygen saturation is 97%.     Chief Complaint: Chest Pain and Cough    This is a 18 y.o. female   who presents today with a chief complaint of chest pain that began a month ago. She's also complaining of a cough that began soon after her chest pain. She was seen in an urgent care last week and prescribed medication to help relieve her symptoms but says nothing has helped. She's been taking Delsom to help relieve the coughing.    Chest Pain    This is a recurrent problem. The current episode started 1 to 4 weeks ago. The onset quality is sudden. The problem occurs constantly. The problem has been unchanged. The pain is at a severity of 6/10. The pain is moderate. The quality of the pain is described as stabbing. The pain does not radiate. Associated symptoms include a cough. Pertinent negatives include no abdominal pain, back pain, nausea, palpitations, syncope or vomiting. The pain is aggravated by coughing. Treatments tried: delsom. The treatment provided no relief. There are no known risk factors.   Cough   This is a recurrent problem. The current episode started 1 to 4 weeks ago (three weeks). The problem has been unchanged. The problem occurs constantly. The cough is non-productive. Associated symptoms include chest pain. Nothing aggravates the symptoms. Treatments tried: delsom. The treatment provided no relief.       Constitution: Negative for fatigue and international travel in last 60 days.   HENT: Negative for trouble swallowing.    Neck: Negative for neck pain.   Cardiovascular: Positive for chest pain. Negative for chest trauma, leg swelling, palpitations and passing out.   Respiratory: Positive for cough. Negative for sleep apnea.  "   Gastrointestinal: Negative for abdominal pain, nausea and vomiting.   Genitourinary: Negative for history of kidney stones.   Musculoskeletal: Negative for back pain.   Neurological: Negative for light-headedness and passing out.   Hematologic/Lymphatic: Negative for history of blood clots.   Psychiatric/Behavioral: Negative for nervous/anxious. The patient is not nervous/anxious.        Objective:      Physical Exam   Constitutional: She is oriented to person, place, and time. She appears well-developed and well-nourished. She is cooperative.  Non-toxic appearance. She does not appear ill. No distress.   HENT:   Head: Normocephalic and atraumatic.   Right Ear: Hearing, tympanic membrane, external ear and ear canal normal.   Left Ear: Hearing, tympanic membrane, external ear and ear canal normal.   Nose: Rhinorrhea present. No mucosal edema or nasal deformity. No epistaxis. Right sinus exhibits no maxillary sinus tenderness and no frontal sinus tenderness. Left sinus exhibits no maxillary sinus tenderness and no frontal sinus tenderness.   Mouth/Throat: Uvula is midline, oropharynx is clear and moist and mucous membranes are normal. No trismus in the jaw. Normal dentition. No uvula swelling. No posterior oropharyngeal erythema. Tonsils are 1+ on the right. Tonsils are 1+ on the left. No tonsillar exudate.   PND noted   Eyes: Conjunctivae and lids are normal. Right eye exhibits no discharge. Left eye exhibits no discharge. No scleral icterus.   Sclera clear bilat   Neck: Trachea normal, normal range of motion, full passive range of motion without pain and phonation normal. Neck supple.   Cardiovascular: Normal rate, regular rhythm, normal heart sounds, intact distal pulses and normal pulses.   Pulmonary/Chest: Effort normal and breath sounds normal. No accessory muscle usage or stridor. No respiratory distress. She has no decreased breath sounds. She has no wheezes. She has no rhonchi. She has no rales. Chest wall  is not dull to percussion. She exhibits tenderness and bony tenderness. She exhibits no mass, no laceration, no crepitus, no edema, no deformity, no swelling and no retraction.       Abdominal: Soft. Normal appearance and bowel sounds are normal. She exhibits no distension, no pulsatile midline mass and no mass. There is no tenderness.   Musculoskeletal: Normal range of motion. She exhibits no edema or deformity.   Lymphadenopathy:        Head (right side): No submental, no submandibular, no tonsillar, no preauricular, no posterior auricular and no occipital adenopathy present.        Head (left side): No submental, no submandibular, no tonsillar, no preauricular, no posterior auricular and no occipital adenopathy present.     She has no cervical adenopathy.        Right cervical: No superficial cervical, no deep cervical and no posterior cervical adenopathy present.       Left cervical: No superficial cervical, no deep cervical and no posterior cervical adenopathy present.   Neurological: She is alert and oriented to person, place, and time. She exhibits normal muscle tone. Coordination normal.   Skin: Skin is warm, dry and intact. She is not diaphoretic. No pallor.   Psychiatric: She has a normal mood and affect. Her speech is normal and behavior is normal. Judgment and thought content normal. Cognition and memory are normal.   Nursing note and vitals reviewed.        XRAY Chest: No acute intrathoracic abnormalities noted    Assessment:       1. Costochondritis    2. Cough    3. Chest wall pain    4. PND (post-nasal drip)        Plan:         Costochondritis  -     ibuprofen (ADVIL,MOTRIN) 600 MG tablet; Take 1 tablet (600 mg total) by mouth every 6 (six) hours as needed.  Dispense: 30 tablet; Refill: 0    Cough  -     X-Ray Chest PA And Lateral; Future; Expected date: 07/22/2019  -     promethazine-dextromethorphan (PROMETHAZINE-DM) 6.25-15 mg/5 mL Syrp; Take 5 mLs by mouth 3 (three) times daily as needed  (cough).  Dispense: 180 mL; Refill: 0    Chest wall pain    PND (post-nasal drip)      Costochondritis    Costochondritis is inflammation of a rib or the cartilage that connects a rib to your breastbone (sternum). It causes tenderness, and sometimes chest pain may be sharp or aching, or it may feel like pressure. Pain may get worse with deep breathing, movement, or exercise. In some cases, the pain is mistaken for a heart attack. Despite this, the condition is not serious. Read on to learn more about the condition and how it can be treated.  What causes costochondritis?  The cause of costochondritis is not completely clear, but it may happen after a chest injury, chest infection, or coughing episode. Some physical activities can sometimes lead to costochondritis. Large-breasted women may be more likely to have the condition. Often, the reason for the inflammation is unknown.  Diagnosing costochondritis  There is no test for costochondritis. The condition is diagnosed by the symptoms you have. Your healthcare provider will perform a physical exam. He or she will ask you about your symptoms and examine your chest for tenderness. In some cases, tests are done to rule out more serious problems. These tests may include imaging tests such as chest X-ray, CT scan, or an ECG.  Treating costochondritis  If an underlying cause is found, treatment for that will likely relieve the problem. Costochondritis often goes away on its own. The course of the condition varies from person to person. It usually lasts from weeks to months. In some cases, mild symptoms continue for months to years. To ease symptoms:  · Take medicine as directed. These relieve pain and swelling. Ibuprofen or other NSAIDs are often recommended. In some cases, you may be given prescription medicine, such as muscle relaxants.  · Avoid activities that put stress on the chest or spine.  · Apply a heating pad (set to warm, not too high, heat) to the breastbone  several times a day.  · Perform stretching exercises as directed.  Call the healthcare provider right away if you have any of the following:  · Pain that is not relieved by medicine  · Shortness of breath  · Lightheadedness, dizziness, or fainting  · Feeling of irregular heartbeat or fast pulse  Anyone with chest pain should see a healthcare provider, especially those who are older and may be at risk for heart disease.   Date Last Reviewed: 10/1/2016  © 7214-9979 [employer]. [address] [place] [address]. All rights reserved. This information is not intended as a substitute for professional medical care. Always follow your healthcare professional's instructions.      Patient Instructions   -Below are suggestions for symptomatic relief:              -Tylenol every 4 hours OR ibuprofen every 6 hours as needed for pain/fever.              -Salt water gargles to soothe throat pain.              -Chloroseptic spray also helps to numb throat pain.              -Nasal saline spray reduces inflammation and dryness.              -Warm face compresses to help with facial sinus pain/pressure.              -Vicks vapor rub at night.              -Flonase OTC or Nasacort OTC for nasal congestion.              -Simple foods like chicken noodle soup.              -Delsym helps with coughing at night              -Zyrtec/Claritin during the day & Benadryl at night may help with allergies.                If you DO NOT have Hypertension or any history of palpitations, it is ok to take over the counter Sudafed or Mucinex D or Allegra-D or Claritin-D or Zyrtec-D.  If you do take one of the above, it is ok to combine that with plain over the counter Mucinex or Allegra or Claritin or Zyrtec. If, for example, you are taking Zyrtec -D, you can combine that with Mucinex, but not Mucinex-D.  If you are taking Mucinex-D, you can combine that with plain Allegra or Claritin or Zyrtec.   If you DO have Hypertension or  palpitations, it is safe to take Coricidin HBP for relief of sinus symptoms.    Please follow up with your Primary care provider within 2-5 days if your signs and symptoms have not resolved or worsen.     If your condition worsens or fails to improve we recommend that you receive another evaluation at the emergency room immediately or contact your primary medical clinic to discuss your concerns.   You must understand that you have received an Urgent Care treatment only and that you may be released before all of your medical problems are known or treated. You, the patient, will arrange for follow up care as instructed.     RED FLAGS/WARNING SYMPTOMS DISCUSSED WITH PATIENT THAT WOULD WARRANT EMERGENT MEDICAL ATTENTION. PATIENT VERBALIZED UNDERSTANDING.

## 2019-07-22 NOTE — PATIENT INSTRUCTIONS
Costochondritis    Costochondritis is inflammation of a rib or the cartilage that connects a rib to your breastbone (sternum). It causes tenderness, and sometimes chest pain may be sharp or aching, or it may feel like pressure. Pain may get worse with deep breathing, movement, or exercise. In some cases, the pain is mistaken for a heart attack. Despite this, the condition is not serious. Read on to learn more about the condition and how it can be treated.  What causes costochondritis?  The cause of costochondritis is not completely clear, but it may happen after a chest injury, chest infection, or coughing episode. Some physical activities can sometimes lead to costochondritis. Large-breasted women may be more likely to have the condition. Often, the reason for the inflammation is unknown.  Diagnosing costochondritis  There is no test for costochondritis. The condition is diagnosed by the symptoms you have. Your healthcare provider will perform a physical exam. He or she will ask you about your symptoms and examine your chest for tenderness. In some cases, tests are done to rule out more serious problems. These tests may include imaging tests such as chest X-ray, CT scan, or an ECG.  Treating costochondritis  If an underlying cause is found, treatment for that will likely relieve the problem. Costochondritis often goes away on its own. The course of the condition varies from person to person. It usually lasts from weeks to months. In some cases, mild symptoms continue for months to years. To ease symptoms:  · Take medicine as directed. These relieve pain and swelling. Ibuprofen or other NSAIDs are often recommended. In some cases, you may be given prescription medicine, such as muscle relaxants.  · Avoid activities that put stress on the chest or spine.  · Apply a heating pad (set to warm, not too high, heat) to the breastbone several times a day.  · Perform stretching exercises as directed.  Call the healthcare  provider right away if you have any of the following:  · Pain that is not relieved by medicine  · Shortness of breath  · Lightheadedness, dizziness, or fainting  · Feeling of irregular heartbeat or fast pulse  Anyone with chest pain should see a healthcare provider, especially those who are older and may be at risk for heart disease.   Date Last Reviewed: 10/1/2016  © 9554-6840 [employer]. [address] [place] [address], [place] [address]. All rights reserved. This information is not intended as a substitute for professional medical care. Always follow your healthcare professional's instructions.      Patient Instructions   -Below are suggestions for symptomatic relief:              -Tylenol every 4 hours OR ibuprofen every 6 hours as needed for pain/fever.              -Salt water gargles to soothe throat pain.              -Chloroseptic spray also helps to numb throat pain.              -Nasal saline spray reduces inflammation and dryness.              -Warm face compresses to help with facial sinus pain/pressure.              -Vicks vapor rub at night.              -Flonase OTC or Nasacort OTC for nasal congestion.              -Simple foods like chicken noodle soup.              -Delsym helps with coughing at night              -Zyrtec/Claritin during the day & Benadryl at night may help with allergies.                If you DO NOT have Hypertension or any history of palpitations, it is ok to take over the counter Sudafed or Mucinex D or Allegra-D or Claritin-D or Zyrtec-D.  If you do take one of the above, it is ok to combine that with plain over the counter Mucinex or Allegra or Claritin or Zyrtec. If, for example, you are taking Zyrtec -D, you can combine that with Mucinex, but not Mucinex-D.  If you are taking Mucinex-D, you can combine that with plain Allegra or Claritin or Zyrtec.   If you DO have Hypertension or palpitations, it is safe to take Coricidin HBP for relief of sinus symptoms.    Please  follow up with your Primary care provider within 2-5 days if your signs and symptoms have not resolved or worsen.     If your condition worsens or fails to improve we recommend that you receive another evaluation at the emergency room immediately or contact your primary medical clinic to discuss your concerns.   You must understand that you have received an Urgent Care treatment only and that you may be released before all of your medical problems are known or treated. You, the patient, will arrange for follow up care as instructed.     RED FLAGS/WARNING SYMPTOMS DISCUSSED WITH PATIENT THAT WOULD WARRANT EMERGENT MEDICAL ATTENTION. PATIENT VERBALIZED UNDERSTANDING.

## 2019-07-25 ENCOUNTER — TELEPHONE (OUTPATIENT)
Dept: URGENT CARE | Facility: CLINIC | Age: 19
End: 2019-07-25

## 2019-11-18 ENCOUNTER — OFFICE VISIT (OUTPATIENT)
Dept: INTERNAL MEDICINE | Facility: CLINIC | Age: 19
End: 2019-11-18
Payer: OTHER GOVERNMENT

## 2019-11-18 VITALS
DIASTOLIC BLOOD PRESSURE: 76 MMHG | HEIGHT: 61 IN | HEART RATE: 83 BPM | WEIGHT: 136.25 LBS | BODY MASS INDEX: 25.72 KG/M2 | OXYGEN SATURATION: 99 % | SYSTOLIC BLOOD PRESSURE: 110 MMHG

## 2019-11-18 DIAGNOSIS — K20.90 ESOPHAGITIS: Primary | ICD-10-CM

## 2019-11-18 PROBLEM — G89.29 CHRONIC PAIN OF BOTH KNEES: Status: RESOLVED | Noted: 2017-11-10 | Resolved: 2019-11-18
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
## 2019-11-18 PROBLEM — M25.562 CHRONIC PAIN OF BOTH KNEES: Status: RESOLVED | Noted: 2017-11-10 | Resolved: 2019-11-18

## 2019-11-18 PROBLEM — M25.561 CHRONIC PAIN OF BOTH KNEES: Status: RESOLVED | Noted: 2017-11-10 | Resolved: 2019-11-18

## 2019-11-18 PROCEDURE — 99999 PR PBB SHADOW E&M-EST. PATIENT-LVL III: ICD-10-PCS | Mod: PBBFAC,,, | Performed by: INTERNAL MEDICINE

## 2019-11-18 PROCEDURE — 99203 PR OFFICE/OUTPT VISIT, NEW, LEVL III, 30-44 MIN: ICD-10-PCS | Mod: S$GLB,,, | Performed by: INTERNAL MEDICINE

## 2019-11-18 PROCEDURE — 99999 PR PBB SHADOW E&M-EST. PATIENT-LVL III: CPT | Mod: PBBFAC,,, | Performed by: INTERNAL MEDICINE

## 2019-11-18 PROCEDURE — 99203 OFFICE O/P NEW LOW 30 MIN: CPT | Mod: S$GLB,,, | Performed by: INTERNAL MEDICINE

## 2019-11-18 RX ORDER — PANTOPRAZOLE SODIUM 40 MG/1
40 TABLET, DELAYED RELEASE ORAL DAILY
Status: ON HOLD | COMMUNITY
End: 2019-11-25 | Stop reason: CLARIF

## 2019-11-18 NOTE — PROGRESS NOTES
Subjective:       Patient ID: Marilyn Salazar is a 19 y.o. female.    Chief Complaint: Chest Pain    HPI  Establishing care.  18 yo BF with 5 months of SSCP worse with meals.  Has been on a PPI  For 2-3 months w/o relief. No sensation of food getting stuck, just c/o a burning sensation. Has occ nausea, had abd pain initially but not currently.  No abd pain, melena/BRBPR.  Chest hurts occasionally with deep inspiration.  Was rxed for pleurisy with prednisone in July w/o effect.  Review of Systems   All other systems reviewed and are negative.      Objective:      Physical Exam   Constitutional: She appears well-developed.   HENT:   Head: Normocephalic.   Eyes: EOM are normal.   Neck: Normal range of motion.   Cardiovascular: Normal rate, regular rhythm, normal heart sounds and intact distal pulses.   Pulmonary/Chest: Effort normal and breath sounds normal.   No rubs   Abdominal: Soft. Bowel sounds are normal. She exhibits no distension. There is no tenderness.   Musculoskeletal: Normal range of motion. She exhibits no edema.   Lymphadenopathy:     She has no cervical adenopathy.   Neurological: She is alert. No cranial nerve deficit. She exhibits normal muscle tone. Coordination normal.   Skin: Skin is warm and dry.   Psychiatric: She has a normal mood and affect. Her behavior is normal.   Vitals reviewed.      Assessment:       1. Esophagitis        Plan:       Marilyn was seen today for chest pain.    Diagnoses and all orders for this visit:    Esophagitis  -     Ambulatory referral to Gastroenterology      No follow-ups on file.

## 2019-11-21 ENCOUNTER — OFFICE VISIT (OUTPATIENT)
Dept: GASTROENTEROLOGY | Facility: CLINIC | Age: 19
End: 2019-11-21
Payer: OTHER GOVERNMENT

## 2019-11-21 ENCOUNTER — TELEPHONE (OUTPATIENT)
Dept: ENDOSCOPY | Facility: HOSPITAL | Age: 19
End: 2019-11-21

## 2019-11-21 VITALS — WEIGHT: 135.13 LBS | BODY MASS INDEX: 25.51 KG/M2 | HEIGHT: 61 IN

## 2019-11-21 DIAGNOSIS — K21.9 GASTROESOPHAGEAL REFLUX DISEASE, ESOPHAGITIS PRESENCE NOT SPECIFIED: Primary | ICD-10-CM

## 2019-11-21 DIAGNOSIS — R07.89 NON-CARDIAC CHEST PAIN: ICD-10-CM

## 2019-11-21 DIAGNOSIS — R10.13 DYSPEPSIA: ICD-10-CM

## 2019-11-21 PROCEDURE — 99204 PR OFFICE/OUTPT VISIT, NEW, LEVL IV, 45-59 MIN: ICD-10-PCS | Mod: S$GLB,,, | Performed by: INTERNAL MEDICINE

## 2019-11-21 PROCEDURE — 99999 PR PBB SHADOW E&M-EST. PATIENT-LVL III: ICD-10-PCS | Mod: PBBFAC,,, | Performed by: INTERNAL MEDICINE

## 2019-11-21 PROCEDURE — 99999 PR PBB SHADOW E&M-EST. PATIENT-LVL III: CPT | Mod: PBBFAC,,, | Performed by: INTERNAL MEDICINE

## 2019-11-21 PROCEDURE — 99204 OFFICE O/P NEW MOD 45 MIN: CPT | Mod: S$GLB,,, | Performed by: INTERNAL MEDICINE

## 2019-11-21 RX ORDER — DICYCLOMINE HYDROCHLORIDE 10 MG/1
10 CAPSULE ORAL
Qty: 30 CAPSULE | Refills: 1 | Status: SHIPPED | OUTPATIENT
Start: 2019-11-21 | End: 2020-03-04 | Stop reason: SDUPTHER

## 2019-11-21 NOTE — PROGRESS NOTES
GASTROENTEROLOGY CLINIC NOTE    Reason for visit: The primary encounter diagnosis was Gastroesophageal reflux disease, esophagitis presence not specified. Diagnoses of Non-cardiac chest pain and Dyspepsia were also pertinent to this visit.  Referring provider/PCP: Myles Anthony MD      HPI:  Marilyn Salazar is a 19 y.o. female here today for evaluation of chest pain. New patient, refer by Dr. Anthony PCP.  Accompanied by mom who assists with history.      Symptoms started around July (3 months or so). Burning in the middle of chest, worse with meals. Symptoms ongoing x 2 -3 months. Has tried protonix during this time x 2 months without much relief. Initially protonix helped but currently not really helping. No dysphagia. occ abd pain and passes gas which relieves. Sometimes abd pain comes after chest pain. Stress does seem to exacerbate symptoms (recently chest pain started before her test in school).    Seen Dr. Aleksandr morales (cardiology) - ekg and echo done and stress test done, all were negative. Also had trial of prednisone for chest pain.    Takes ppi daily, on depo shot.  NSAIDs once a week.    Mom has CHF, HTN, thyroid issues, blood clots.    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         Prior Endoscopy:  EGD: / Colon: none    (Portions of this note were dictated using voice recognition software and may contain dictation related errors in spelling/grammar/syntax not found on text review)    Review of Systems   Constitutional: Negative for fever and weight loss.   HENT: Negative for nosebleeds and sore throat.    Eyes: Negative for double vision and photophobia.   Respiratory: Negative for cough and shortness of breath.    Cardiovascular: Negative for chest pain and leg swelling.   Gastrointestinal: Positive for abdominal pain and heartburn. Negative for blood in stool and vomiting.   Genitourinary: Negative for dysuria and hematuria.   Musculoskeletal: Negative for joint pain and neck pain.  "  Skin: Negative for itching and rash.   Neurological: Negative for dizziness and headaches.   Psychiatric/Behavioral: Negative for hallucinations. The patient is nervous/anxious. The patient does not have insomnia.        Past Medical History: has a past medical history of Migraines.    Past Surgical History: has a past surgical history that includes Ankle surgery and Knee arthroscopy (Left, 2/5/2015).    Family History:family history includes Arthritis in her mother; Heart failure in her mother; Hypertension in her mother; No Known Problems in her sister.    Allergies:   Review of patient's allergies indicates:   Allergen Reactions    Azithromycin        Social History: reports that she has never smoked. She has never used smokeless tobacco. She reports that she does not drink alcohol or use drugs.    Home medications:   Current Outpatient Medications on File Prior to Visit   Medication Sig Dispense Refill    cetirizine (ZYRTEC) 10 MG tablet TAKE 1 TABLET(S) EVERY DAY BY ORAL ROUTE FOR 30 DAYS.  3    FLUARIX QUAD 1034-5524, PF, 60 mcg (15 mcg x 4)/0.5 mL Syrg TO BE ADMINISTERED BY PHARMACIST FOR IMMUNIZATION  0    fluticasone (FLONASE) 50 mcg/actuation nasal spray SPRAY 1 SPRAY(S) EVERY DAY BY INTRANASAL ROUTE FOR 30 DAYS.  3    ibuprofen (ADVIL,MOTRIN) 600 MG tablet Take 1 tablet (600 mg total) by mouth every 6 (six) hours as needed. 30 tablet 0    medroxyPROGESTERone (DEPO-PROVERA) 150 mg/mL Syrg Inject 150 mg into the muscle every 3 (three) months.      pantoprazole (PROTONIX) 40 MG tablet Take 40 mg by mouth once daily.      [DISCONTINUED] (Magic mouthwash) 1:1:1 Benadryl 12.5mg/5ml liq, aluminum & magnesium hydroxide-simehticone (Maalox), lidocaine viscous 2% Swish and spit 5 mLs every 4 (four) hours as needed. (Patient not taking: Reported on 11/18/2019) 90 mL 0    [DISCONTINUED] amitriptyline (ELAVIL) 25 MG tablet 1 pill "around" dinner every night (Patient not taking: Reported on 11/18/2019) 30 " "tablet 6    [DISCONTINUED] predniSONE (DELTASONE) 20 MG tablet 2 PO daily x 3 days then 1 po daily x 3 days then 1/2 po daily x 3 days then stop (Patient not taking: Reported on 11/18/2019) 11 tablet 0     Current Facility-Administered Medications on File Prior to Visit   Medication Dose Route Frequency Provider Last Rate Last Dose    medroxyPROGESTERone (DEPO-PROVERA) injection 150 mg  150 mg Intramuscular Q90 Days Jignesh Jason MD   150 mg at 01/04/17 1336       Vital signs:  Ht 5' 1" (1.549 m)   Wt 61.3 kg (135 lb 2.3 oz)   BMI 25.53 kg/m²     Physical Exam   Constitutional: She is oriented to person, place, and time. No distress.   HENT:   Head: Normocephalic and atraumatic.   Eyes: Conjunctivae are normal. No scleral icterus.   Neck: Neck supple.   Cardiovascular: Normal rate, normal heart sounds and intact distal pulses.   Pulmonary/Chest: Effort normal and breath sounds normal. No stridor. No respiratory distress.   Abdominal: Soft. She exhibits no distension and no mass. There is no tenderness. There is no rebound and no guarding.   Musculoskeletal: She exhibits no tenderness or deformity.   Neurological: She is alert and oriented to person, place, and time. Gait normal.   Skin: Skin is warm and dry. No rash noted. She is not diaphoretic.   Psychiatric: She has a normal mood and affect. Her behavior is normal.   Vitals reviewed.      Routine labs:  Lab Results   Component Value Date    WBC 5.55 07/09/2019    HGB 13.3 07/09/2019    HCT 41.9 07/09/2019    MCV 90 07/09/2019     07/09/2019     No results found for: INR  No results found for: IRON, FERRITIN, TIBC, FESATURATED  No results found for: NA, K, CL, CO2, BUN, CREATININE, GLUF  No results found for: ALBUMIN, ALT, AST, GGT, ALKPHOS, BILITOT  No results found for: GLUCOSE    Imaging:  None pertinent.    I have reviewed her recent PCP note. And urgent care note from 7/2019        Assessment:    1. Gastroesophageal reflux disease, " esophagitis presence not specified    2. Non-cardiac chest pain    3. Dyspepsia        Plan:    Orders Placed This Encounter    dicyclomine (BENTYL) 10 MG capsule    Case request GI: EGD (ESOPHAGOGASTRODUODENOSCOPY)     Cardiology evaluation has been negative.  Has not really had much benefit from a PPI trial.  Does seem to have a component of stress relation.    Will plan EGD to further evaluate and rule out structural causes, however I suspect this may be functional or stress-induced.    Will trial Gaviscon and will also give a prescription for Bentyl to see if this helps.    RTC  After egd      Chino Whelan MD  [facility]

## 2019-11-21 NOTE — H&P (VIEW-ONLY)
GASTROENTEROLOGY CLINIC NOTE    Reason for visit: The primary encounter diagnosis was Gastroesophageal reflux disease, esophagitis presence not specified. Diagnoses of Non-cardiac chest pain and Dyspepsia were also pertinent to this visit.  Referring provider/PCP: Myles Anthony MD      HPI:  Marilyn Salazar is a 19 y.o. female here today for evaluation of chest pain. New patient, refer by Dr. Anthony PCP.  Accompanied by mom who assists with history.      Symptoms started around July (3 months or so). Burning in the middle of chest, worse with meals. Symptoms ongoing x 2 -3 months. Has tried protonix during this time x 2 months without much relief. Initially protonix helped but currently not really helping. No dysphagia. occ abd pain and passes gas which relieves. Sometimes abd pain comes after chest pain. Stress does seem to exacerbate symptoms (recently chest pain started before her test in school).    Seen Dr. Aleksandr morales (cardiology) - ekg and echo done and stress test done, all were negative. Also had trial of prednisone for chest pain.    Takes ppi daily, on depo shot.  NSAIDs once a week.    Mom has CHF, HTN, thyroid issues, blood clots.    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         Prior Endoscopy:  EGD: / Colon: none    (Portions of this note were dictated using voice recognition software and may contain dictation related errors in spelling/grammar/syntax not found on text review)    Review of Systems   Constitutional: Negative for fever and weight loss.   HENT: Negative for nosebleeds and sore throat.    Eyes: Negative for double vision and photophobia.   Respiratory: Negative for cough and shortness of breath.    Cardiovascular: Negative for chest pain and leg swelling.   Gastrointestinal: Positive for abdominal pain and heartburn. Negative for blood in stool and vomiting.   Genitourinary: Negative for dysuria and hematuria.   Musculoskeletal: Negative for joint pain and neck pain.  "  Skin: Negative for itching and rash.   Neurological: Negative for dizziness and headaches.   Psychiatric/Behavioral: Negative for hallucinations. The patient is nervous/anxious. The patient does not have insomnia.        Past Medical History: has a past medical history of Migraines.    Past Surgical History: has a past surgical history that includes Ankle surgery and Knee arthroscopy (Left, 2/5/2015).    Family History:family history includes Arthritis in her mother; Heart failure in her mother; Hypertension in her mother; No Known Problems in her sister.    Allergies:   Review of patient's allergies indicates:   Allergen Reactions    Azithromycin        Social History: reports that she has never smoked. She has never used smokeless tobacco. She reports that she does not drink alcohol or use drugs.    Home medications:   Current Outpatient Medications on File Prior to Visit   Medication Sig Dispense Refill    cetirizine (ZYRTEC) 10 MG tablet TAKE 1 TABLET(S) EVERY DAY BY ORAL ROUTE FOR 30 DAYS.  3    FLUARIX QUAD 7425-6445, PF, 60 mcg (15 mcg x 4)/0.5 mL Syrg TO BE ADMINISTERED BY PHARMACIST FOR IMMUNIZATION  0    fluticasone (FLONASE) 50 mcg/actuation nasal spray SPRAY 1 SPRAY(S) EVERY DAY BY INTRANASAL ROUTE FOR 30 DAYS.  3    ibuprofen (ADVIL,MOTRIN) 600 MG tablet Take 1 tablet (600 mg total) by mouth every 6 (six) hours as needed. 30 tablet 0    medroxyPROGESTERone (DEPO-PROVERA) 150 mg/mL Syrg Inject 150 mg into the muscle every 3 (three) months.      pantoprazole (PROTONIX) 40 MG tablet Take 40 mg by mouth once daily.      [DISCONTINUED] (Magic mouthwash) 1:1:1 Benadryl 12.5mg/5ml liq, aluminum & magnesium hydroxide-simehticone (Maalox), lidocaine viscous 2% Swish and spit 5 mLs every 4 (four) hours as needed. (Patient not taking: Reported on 11/18/2019) 90 mL 0    [DISCONTINUED] amitriptyline (ELAVIL) 25 MG tablet 1 pill "around" dinner every night (Patient not taking: Reported on 11/18/2019) 30 " "tablet 6    [DISCONTINUED] predniSONE (DELTASONE) 20 MG tablet 2 PO daily x 3 days then 1 po daily x 3 days then 1/2 po daily x 3 days then stop (Patient not taking: Reported on 11/18/2019) 11 tablet 0     Current Facility-Administered Medications on File Prior to Visit   Medication Dose Route Frequency Provider Last Rate Last Dose    medroxyPROGESTERone (DEPO-PROVERA) injection 150 mg  150 mg Intramuscular Q90 Days Jignesh Jason MD   150 mg at 01/04/17 1336       Vital signs:  Ht 5' 1" (1.549 m)   Wt 61.3 kg (135 lb 2.3 oz)   BMI 25.53 kg/m²     Physical Exam   Constitutional: She is oriented to person, place, and time. No distress.   HENT:   Head: Normocephalic and atraumatic.   Eyes: Conjunctivae are normal. No scleral icterus.   Neck: Neck supple.   Cardiovascular: Normal rate, normal heart sounds and intact distal pulses.   Pulmonary/Chest: Effort normal and breath sounds normal. No stridor. No respiratory distress.   Abdominal: Soft. She exhibits no distension and no mass. There is no tenderness. There is no rebound and no guarding.   Musculoskeletal: She exhibits no tenderness or deformity.   Neurological: She is alert and oriented to person, place, and time. Gait normal.   Skin: Skin is warm and dry. No rash noted. She is not diaphoretic.   Psychiatric: She has a normal mood and affect. Her behavior is normal.   Vitals reviewed.      Routine labs:  Lab Results   Component Value Date    WBC 5.55 07/09/2019    HGB 13.3 07/09/2019    HCT 41.9 07/09/2019    MCV 90 07/09/2019     07/09/2019     No results found for: INR  No results found for: IRON, FERRITIN, TIBC, FESATURATED  No results found for: NA, K, CL, CO2, BUN, CREATININE, GLUF  No results found for: ALBUMIN, ALT, AST, GGT, ALKPHOS, BILITOT  No results found for: GLUCOSE    Imaging:  None pertinent.    I have reviewed her recent PCP note. And urgent care note from 7/2019        Assessment:    1. Gastroesophageal reflux disease, " esophagitis presence not specified    2. Non-cardiac chest pain    3. Dyspepsia        Plan:    Orders Placed This Encounter    dicyclomine (BENTYL) 10 MG capsule    Case request GI: EGD (ESOPHAGOGASTRODUODENOSCOPY)     Cardiology evaluation has been negative.  Has not really had much benefit from a PPI trial.  Does seem to have a component of stress relation.    Will plan EGD to further evaluate and rule out structural causes, however I suspect this may be functional or stress-induced.    Will trial Gaviscon and will also give a prescription for Bentyl to see if this helps.    RTC  After egd      Chino Whelan MD  [facility]

## 2019-11-21 NOTE — TELEPHONE ENCOUNTER
Left a message to call back at   Bet 8am to 4pm  Arrival time at 1345   EGD with Dr Whelan  Sent a message to her portal

## 2019-11-21 NOTE — PATIENT INSTRUCTIONS
Try gaviscon - liquid  Bentyl (as needed) before meals.    Schedule EGD.                        EGD Prep Instructions    [facility] [address] [place] [address]    You are scheduled for an EGD with Dr. Whelan on 11/25/19 at [facility] located at [address] [place] [address].  Check in at the admit desk, first floor of the hospital (which is the building on the left).   You will receive a call 2-3 days before your EGD to tell you the time to arrive.  If you have not received a call by the day before your procedure, call the Endoscopy Lab at [phone].    Nothing to eat or drink after midnight before the procedure.  You MAY brush your teeth.    You MAY take your blood pressure, heart, and seizure medication on the morning of the procedure, with a SIP of water.  Hold ALL other medications until after the procedure.    If you are on blood thinners THAT YOU HAVE BEEN INSTRUCTED TO HOLD BY YOUR DOCTOR FOR THIS PROCEDURE, then do NOT take this the morning of your EGD.  Do NOT stop these medications on your own, they must be approved to be held by your doctor.  Your EGD can NOT be done if you are on these medications.  Examples of blood thinners include: Coumadin, Aggrenox, Plavix, Pradaxa, Reapro, Pletal, Xarelto, Ticagrelor, Brilinta, Eliquis, and high dose aspirin (325 mg).  You do not have to stop baby aspirin 81 mg.    You will receive a call 2-3 days before your EGD to tell you the time to arrive.  If you have not received a call by the day before your procedure, call the Endoscopy department at [phone].

## 2019-11-21 NOTE — LETTER
November 21, 2019      Myles Anthony MD  [address]  [address]  [place] [address]           [place] [facility]  [address], [address]  [place] [address]  Phone: [phone]  Fax: [phone]          Patient: Marilyn Salazar   MR Number: [id number]   YOB: 2000   Date of Visit: 11/21/2019       Dear Dr. Myles Anthony:    Thank you for referring Marilyn Salazar to me for evaluation. Attached you will find relevant portions of my assessment and plan of care.    If you have questions, please do not hesitate to call me. I look forward to following Marilyn Salazar along with you.    Sincerely,    Chino Whelan MD    Enclosure  CC:  No Recipients    If you would like to receive this communication electronically, please contact [email or web address] or [phone] to request more information on [employer] Link access.    For providers and/or their staff who would like to refer a patient to [facility], please contact us through our one-stop-shop provider referral line, [facility], at [phone].    If you feel you have received this communication in error or would no longer like to receive these types of communications, please e-mail [email or web address]

## 2019-11-22 ENCOUNTER — TELEPHONE (OUTPATIENT)
Dept: ENDOSCOPY | Facility: HOSPITAL | Age: 19
End: 2019-11-22

## 2019-11-22 NOTE — TELEPHONE ENCOUNTER
Spoke with patient about arrival time @ 1345.     NPO status reviewed: Patient must have nothing to eat after midnight.  Pt may have CLEAR liquids ONLY until completely NPO 3 hrs @ 1045.    Medications: Do not take Insulin or oral diabetic medications the day of the procedure.  Take as prescribed: heart, seizure and blood pressure medication in the morning with a sip of water (less than an ounce).  Take any breathing medications and bring inhalers to hospital with you Leave all valuables and jewelry at home.     Wear comfortable clothes to procedure to change into hospital gown You cannot drive for 24 hours after your procedure because you will receive sedation for your procedure to make you comfortable.  A ride must be provided at discharge.

## 2019-11-25 ENCOUNTER — ANESTHESIA (OUTPATIENT)
Dept: ENDOSCOPY | Facility: HOSPITAL | Age: 19
End: 2019-11-25
Payer: OTHER GOVERNMENT

## 2019-11-25 ENCOUNTER — ANESTHESIA EVENT (OUTPATIENT)
Dept: ENDOSCOPY | Facility: HOSPITAL | Age: 19
End: 2019-11-25
Payer: OTHER GOVERNMENT

## 2019-11-25 ENCOUNTER — HOSPITAL ENCOUNTER (OUTPATIENT)
Facility: HOSPITAL | Age: 19
Discharge: HOME | End: 2019-11-25
Attending: INTERNAL MEDICINE | Admitting: INTERNAL MEDICINE
Payer: OTHER GOVERNMENT

## 2019-11-25 VITALS
SYSTOLIC BLOOD PRESSURE: 109 MMHG | RESPIRATION RATE: 18 BRPM | DIASTOLIC BLOOD PRESSURE: 78 MMHG | OXYGEN SATURATION: 100 % | HEART RATE: 99 BPM

## 2019-11-25 DIAGNOSIS — K21.9 GASTROESOPHAGEAL REFLUX DISEASE, ESOPHAGITIS PRESENCE NOT SPECIFIED: Primary | ICD-10-CM

## 2019-11-25 DIAGNOSIS — K21.9 GERD (GASTROESOPHAGEAL REFLUX DISEASE): ICD-10-CM

## 2019-11-25 LAB
B-HCG UR QL: NEGATIVE
CTP QC/QA: YES

## 2019-11-25 PROCEDURE — 88305 TISSUE EXAM BY PATHOLOGIST: ICD-10-PCS | Mod: 26,,, | Performed by: PATHOLOGY

## 2019-11-25 PROCEDURE — 43239 EGD BIOPSY SINGLE/MULTIPLE: CPT | Mod: ,,, | Performed by: INTERNAL MEDICINE

## 2019-11-25 PROCEDURE — 88305 TISSUE EXAM BY PATHOLOGIST: CPT | Mod: 26,,, | Performed by: PATHOLOGY

## 2019-11-25 PROCEDURE — 43239 EGD BIOPSY SINGLE/MULTIPLE: CPT | Performed by: INTERNAL MEDICINE

## 2019-11-25 PROCEDURE — 81025 URINE PREGNANCY TEST: CPT | Performed by: INTERNAL MEDICINE

## 2019-11-25 PROCEDURE — 37000009 HC ANESTHESIA EA ADD 15 MINS: Performed by: INTERNAL MEDICINE

## 2019-11-25 PROCEDURE — 43239 PR EGD, FLEX, W/BIOPSY, SGL/MULTI: ICD-10-PCS | Mod: ,,, | Performed by: INTERNAL MEDICINE

## 2019-11-25 PROCEDURE — 25000003 PHARM REV CODE 250: Performed by: NURSE ANESTHETIST, CERTIFIED REGISTERED

## 2019-11-25 PROCEDURE — 27201012 HC FORCEPS, HOT/COLD, DISP: Performed by: INTERNAL MEDICINE

## 2019-11-25 PROCEDURE — 88305 TISSUE EXAM BY PATHOLOGIST: CPT | Performed by: PATHOLOGY

## 2019-11-25 PROCEDURE — 63600175 PHARM REV CODE 636 W HCPCS: Performed by: NURSE ANESTHETIST, CERTIFIED REGISTERED

## 2019-11-25 PROCEDURE — 63600175 PHARM REV CODE 636 W HCPCS: Performed by: INTERNAL MEDICINE

## 2019-11-25 PROCEDURE — 37000008 HC ANESTHESIA 1ST 15 MINUTES: Performed by: INTERNAL MEDICINE

## 2019-11-25 RX ORDER — SODIUM CHLORIDE 9 MG/ML
INJECTION, SOLUTION INTRAVENOUS CONTINUOUS
Status: DISCONTINUED | OUTPATIENT
Start: 2019-11-25 | End: 2019-11-25 | Stop reason: HOSPADM

## 2019-11-25 RX ORDER — PROPOFOL 10 MG/ML
VIAL (ML) INTRAVENOUS CONTINUOUS PRN
Status: DISCONTINUED | OUTPATIENT
Start: 2019-11-25 | End: 2019-11-25

## 2019-11-25 RX ORDER — PROPOFOL 10 MG/ML
VIAL (ML) INTRAVENOUS
Status: DISCONTINUED | OUTPATIENT
Start: 2019-11-25 | End: 2019-11-25

## 2019-11-25 RX ORDER — SODIUM CHLORIDE 0.9 % (FLUSH) 0.9 %
10 SYRINGE (ML) INJECTION
Status: DISCONTINUED | OUTPATIENT
Start: 2019-11-25 | End: 2019-11-25 | Stop reason: HOSPADM

## 2019-11-25 RX ORDER — OMEPRAZOLE 20 MG/1
20 CAPSULE, DELAYED RELEASE ORAL DAILY
COMMUNITY
End: 2020-05-05

## 2019-11-25 RX ORDER — LIDOCAINE HCL/PF 100 MG/5ML
SYRINGE (ML) INTRAVENOUS
Status: DISCONTINUED | OUTPATIENT
Start: 2019-11-25 | End: 2019-11-25

## 2019-11-25 RX ADMIN — LIDOCAINE HYDROCHLORIDE 50 MG: 20 INJECTION, SOLUTION INTRAVENOUS at 03:11

## 2019-11-25 RX ADMIN — PROPOFOL 30 MG: 10 INJECTION, EMULSION INTRAVENOUS at 03:11

## 2019-11-25 RX ADMIN — PROPOFOL 150 MCG/KG/MIN: 10 INJECTION, EMULSION INTRAVENOUS at 03:11

## 2019-11-25 RX ADMIN — SODIUM CHLORIDE 20 ML/HR: 0.9 INJECTION, SOLUTION INTRAVENOUS at 02:11

## 2019-11-25 RX ADMIN — TOPICAL ANESTHETIC 1 EACH: 200 SPRAY DENTAL; PERIODONTAL at 03:11

## 2019-11-25 RX ADMIN — PROPOFOL 50 MG: 10 INJECTION, EMULSION INTRAVENOUS at 03:11

## 2019-11-25 NOTE — ANESTHESIA POSTPROCEDURE EVALUATION
Anesthesia Post Evaluation    Patient: Marilyn Salazar    Procedure(s) Performed: Procedure(s) (LRB):  EGD (ESOPHAGOGASTRODUODENOSCOPY) (N/A)    Final Anesthesia Type: MAC    Patient location during evaluation: GI PACU  Patient participation: Yes- Able to Participate  Level of consciousness: awake and alert and oriented  Post-procedure vital signs: reviewed and stable  Pain management: adequate  Airway patency: patent    PONV status at discharge: No PONV  Anesthetic complications: no      Cardiovascular status: blood pressure returned to baseline and hemodynamically stable  Respiratory status: unassisted, spontaneous ventilation and room air  Hydration status: euvolemic  Follow-up not needed.          Vitals Value Taken Time   /67 11/25/2019  3:45 PM   Temp 36.7 11/25/2019  3:45 PM   Pulse 90 11/25/2019  3:45 PM   Resp 16 11/25/2019  3:45 PM   SpO2 100 11/25/2019  3:45 PM         No case tracking events are documented in the log.      Pain/Kai Score: No data recorded

## 2019-11-25 NOTE — [employer] PATIENT INSTRUCTIONS
Discharge Summary/Instructions after an Endoscopic Procedure  Patient Name: Marilyn Salazar  Patient MRN: [id number]  Patient YOB: 2000 Monday, November 25, 2019  Chino Whelan MD  RESTRICTIONS:  During your procedure today, you received medications for sedation.  These   medications may affect your judgment, balance and coordination.  Therefore,   for 24 hours, you have the following restrictions:   - DO NOT drive a car, operate machinery, make legal/financial decisions,   sign important papers or drink alcohol.    ACTIVITY:  Today: no heavy lifting, straining or running due to procedural   sedation/anesthesia.  The following day: return to full activity including work.  DIET:  Eat and drink normally unless instructed otherwise.     TREATMENT FOR COMMON SIDE EFFECTS:  - Mild abdominal pain, nausea, belching, bloating or excessive gas:  rest,   eat lightly and use a heating pad.  - Sore Throat: treat with throat lozenges and/or gargle with warm salt   water.  - Because air was used during the procedure, expelling large amounts of air   from your rectum or belching is normal.  - If a bowel prep was taken, you may not have a bowel movement for 1-3 days.    This is normal.  SYMPTOMS TO WATCH FOR AND REPORT TO YOUR PHYSICIAN:  1. Abdominal pain or bloating, other than gas cramps.  2. Chest pain.  3. Back pain.  4. Signs of infection such as: chills or fever occurring within 24 hours   after the procedure.  5. Rectal bleeding, which would show as bright red, maroon, or black stools.   (A tablespoon of blood from the rectum is not serious, especially if   hemorrhoids are present.)  6. Vomiting.  7. Weakness or dizziness.  GO DIRECTLY TO THE NEAREST EMERGENCY ROOM IF YOU HAVE ANY OF THE FOLLOWING:      Difficulty breathing              Chills and/or fever over 101 F   Persistent vomiting and/or vomiting blood   Severe abdominal pain   Severe chest pain   Black, tarry stools   Bleeding- more than one  tablespoon   Any other symptom or condition that you feel may need urgent attention  Your doctor recommends these additional instructions:  If any biopsies were taken, your doctors clinic will contact you in 1 to 2   weeks with any results.  - Discharge patient to home.   - Patient has a contact number available for emergencies.  The signs and   symptoms of potential delayed complications were discussed with the   patient.  Return to normal activities tomorrow.  Written discharge   instructions were provided to the patient.   - Resume previous diet.   - Continue present medications.   - Await pathology results.   - Return to referring physician as previously scheduled.   - No findings to explain non-cardiac chest pain on today's exam. Return to   PCP to further evaluate potential alternative causes including anxiety,   stress related pains.  For questions, problems or results please call your physician - Chino Whelan MD at Work:  [phone].  EMERGENCY PHONE NUMBER: [phone],  LAB RESULTS: [phone]  IF A COMPLICATION OR EMERGENCY SITUATION ARISES AND YOU ARE UNABLE TO REACH   YOUR PHYSICIAN - GO DIRECTLY TO THE EMERGENCY ROOM.  Chino Whelan MD  11/25/2019 3:41:28 PM  This report has been verified and signed electronically.  [employer]

## 2019-11-25 NOTE — ANESTHESIA PREPROCEDURE EVALUATION
11/25/2019  Marilyn Salazar is a 19 y.o., female for EGD under MAC    Past Medical History:   Diagnosis Date    Migraines      Past Surgical History:   Procedure Laterality Date    ANKLE SURGERY      left ankle    KNEE ARTHROSCOPY Left 2/5/2015    Dr. Barrios          Anesthesia Evaluation    I have reviewed the Patient Summary Reports.    I have reviewed the Nursing Notes.   I have reviewed the Medications.     Review of Systems  Social:  Non-Smoker        Physical Exam  General:  Well nourished    Airway/Jaw/Neck:  Airway Findings: Mallampati: II      Chest/Lungs:  Chest/Lungs Clear    Heart/Vascular:  Heart Findings: Normal            Anesthesia Plan  Type of Anesthesia, risks & benefits discussed:  Anesthesia Type:  MAC  Patient's Preference:   Intra-op Monitoring Plan:   Intra-op Monitoring Plan Comments:   Post Op Pain Control Plan:   Post Op Pain Control Plan Comments:   Induction:    Beta Blocker:  Patient is not currently on a Beta-Blocker (No further documentation required).       Informed Consent: Patient understands risks and agrees with Anesthesia plan.  Questions answered. Anesthesia consent signed with patient.  ASA Score: 1     Day of Surgery Review of History & Physical:            Ready For Surgery From Anesthesia Perspective.

## 2019-11-25 NOTE — TRANSFER OF CARE
Anesthesia Transfer of Care Note    Patient: Marilyn Salazar    Procedure(s) Performed: Procedure(s) (LRB):  EGD (ESOPHAGOGASTRODUODENOSCOPY) (N/A)    Patient location: GI    Anesthesia Type: MAC    Transport from OR: Transported from OR on room air with adequate spontaneous ventilation    Post pain: adequate analgesia    Post assessment: no apparent anesthetic complications and tolerated procedure well    Post vital signs: stable    Level of consciousness: awake, alert and oriented    Nausea/Vomiting: no nausea/vomiting    Complications: none    Transfer of care protocol was followed      Last vitals:   Visit Vitals  LMP 11/25/2019 (Exact Date)   Breastfeeding? No

## 2019-12-02 LAB
FINAL PATHOLOGIC DIAGNOSIS: NORMAL
GROSS: NORMAL

## 2019-12-03 ENCOUNTER — TELEPHONE (OUTPATIENT)
Dept: GASTROENTEROLOGY | Facility: CLINIC | Age: 19
End: 2019-12-03

## 2019-12-03 NOTE — TELEPHONE ENCOUNTER
----- Message from Chino Whelan MD sent at 12/3/2019  8:44 AM CST -----  Please inform, biopsies all normal. No evidence of allergic conditions of the esophagus.

## 2020-01-09 ENCOUNTER — OFFICE VISIT (OUTPATIENT)
Dept: INTERNAL MEDICINE | Facility: CLINIC | Age: 20
End: 2020-01-09
Payer: OTHER GOVERNMENT

## 2020-01-09 VITALS
BODY MASS INDEX: 25.78 KG/M2 | OXYGEN SATURATION: 99 % | WEIGHT: 136.56 LBS | HEIGHT: 61 IN | HEART RATE: 81 BPM | DIASTOLIC BLOOD PRESSURE: 80 MMHG | SYSTOLIC BLOOD PRESSURE: 112 MMHG

## 2020-01-09 DIAGNOSIS — F41.9 ANXIETY: Primary | ICD-10-CM

## 2020-01-09 PROCEDURE — 99999 PR PBB SHADOW E&M-EST. PATIENT-LVL III: CPT | Mod: PBBFAC,,, | Performed by: INTERNAL MEDICINE

## 2020-01-09 PROCEDURE — 99214 PR OFFICE/OUTPT VISIT, EST, LEVL IV, 30-39 MIN: ICD-10-PCS | Mod: S$GLB,,, | Performed by: INTERNAL MEDICINE

## 2020-01-09 PROCEDURE — 99999 PR PBB SHADOW E&M-EST. PATIENT-LVL III: ICD-10-PCS | Mod: PBBFAC,,, | Performed by: INTERNAL MEDICINE

## 2020-01-09 PROCEDURE — 99214 OFFICE O/P EST MOD 30 MIN: CPT | Mod: S$GLB,,, | Performed by: INTERNAL MEDICINE

## 2020-01-09 RX ORDER — CLONAZEPAM 0.5 MG/1
0.5 TABLET ORAL 2 TIMES DAILY PRN
Qty: 60 TABLET | Refills: 3 | Status: SHIPPED | OUTPATIENT
Start: 2020-01-09 | End: 2020-11-30

## 2020-01-09 NOTE — PROGRESS NOTES
Subjective:       Patient ID: Marilyn Salazar is a 19 y.o. female.    Chief Complaint: Anxiety    HPI  Pt had been experiencing GERD and a sensation of globus which resolved once she was out of school on winter break.  She had a normal EGD including normal esophageal biopsies. She admits to feeling stressed during the school year. Weight stable.  Chart reviewed.  Review of Systems   All other systems reviewed and are negative.      Objective:      Physical Exam   Constitutional: She appears well-developed.   HENT:   Head: Normocephalic.   Mouth/Throat: Oropharynx is clear and moist.   Eyes: EOM are normal.   Neck: Normal range of motion.   Cardiovascular: Normal rate, regular rhythm, normal heart sounds and intact distal pulses.   Pulmonary/Chest: Effort normal and breath sounds normal.   Abdominal: Bowel sounds are normal.   Musculoskeletal: Normal range of motion. She exhibits no edema.   Lymphadenopathy:     She has no cervical adenopathy.   Neurological: She is alert. No cranial nerve deficit. She exhibits normal muscle tone. Coordination normal.   Skin: Skin is warm and dry.   Psychiatric: She has a normal mood and affect. Her behavior is normal.   Vitals reviewed.      Assessment:       1. Anxiety        Plan:       Marilyn was seen today for anxiety.    Diagnoses and all orders for this visit:    Anxiety  -     clonazePAM (KLONOPIN) 0.5 MG tablet; Take 1 tablet (0.5 mg total) by mouth 2 (two) times daily as needed for Anxiety.      Follow up if symptoms worsen or fail to improve.

## 2020-02-16 ENCOUNTER — HOSPITAL ENCOUNTER (OUTPATIENT)
Dept: RADIOLOGY | Facility: CLINIC | Age: 20
Discharge: HOME | End: 2020-02-16
Attending: NURSE PRACTITIONER
Payer: OTHER GOVERNMENT

## 2020-02-16 ENCOUNTER — OFFICE VISIT (OUTPATIENT)
Dept: URGENT CARE | Facility: CLINIC | Age: 20
End: 2020-02-16
Payer: OTHER GOVERNMENT

## 2020-02-16 VITALS
BODY MASS INDEX: 24.96 KG/M2 | HEIGHT: 61 IN | TEMPERATURE: 99 F | WEIGHT: 132.19 LBS | HEART RATE: 81 BPM | OXYGEN SATURATION: 100 % | RESPIRATION RATE: 12 BRPM | DIASTOLIC BLOOD PRESSURE: 85 MMHG | SYSTOLIC BLOOD PRESSURE: 120 MMHG

## 2020-02-16 DIAGNOSIS — N20.0 KIDNEY STONE: ICD-10-CM

## 2020-02-16 DIAGNOSIS — R10.9 ABDOMINAL PAIN, UNSPECIFIED ABDOMINAL LOCATION: ICD-10-CM

## 2020-02-16 DIAGNOSIS — R10.9 ABDOMINAL PAIN, UNSPECIFIED ABDOMINAL LOCATION: Primary | ICD-10-CM

## 2020-02-16 PROCEDURE — 74019 XR ABDOMEN FLAT AND ERECT: ICD-10-PCS | Mod: S$GLB,,, | Performed by: RADIOLOGY

## 2020-02-16 PROCEDURE — 99214 OFFICE O/P EST MOD 30 MIN: CPT | Mod: S$GLB,,, | Performed by: NURSE PRACTITIONER

## 2020-02-16 PROCEDURE — 74019 RADEX ABDOMEN 2 VIEWS: CPT | Mod: S$GLB,,, | Performed by: RADIOLOGY

## 2020-02-16 PROCEDURE — 99214 PR OFFICE/OUTPT VISIT, EST, LEVL IV, 30-39 MIN: ICD-10-PCS | Mod: S$GLB,,, | Performed by: NURSE PRACTITIONER

## 2020-02-16 RX ORDER — ONDANSETRON 4 MG/1
4 TABLET, ORALLY DISINTEGRATING ORAL EVERY 8 HOURS PRN
Qty: 20 TABLET | Refills: 0 | Status: SHIPPED | OUTPATIENT
Start: 2020-02-16 | End: 2020-03-11

## 2020-02-16 RX ORDER — TAMSULOSIN HYDROCHLORIDE 0.4 MG/1
0.4 CAPSULE ORAL DAILY
Qty: 14 CAPSULE | Refills: 0 | Status: SHIPPED | OUTPATIENT
Start: 2020-02-16 | End: 2020-03-04 | Stop reason: SDUPTHER

## 2020-02-16 NOTE — PATIENT INSTRUCTIONS
Unknown Causes of Abdominal Pain (Female)    The exact cause of your abdominal (stomach) pain is not clear. This does not mean that this is something to worry about. Everyone likes to know the exact cause of the problem, but sometimes with abdominal pain, there is no clear-cut cause, and this could be a good thing. The good news is that your symptoms can be treated, and you will feel better.   Your condition does not seem serious now; however, sometimes the signs of a serious problem may take more time to appear. For this reason, it is important for you to watch for any new symptoms, problems, or worsening of your condition.  Over the next few days, the abdominal pain may come and go, or be continuous. Other common symptoms can include nausea and vomiting. Sometimes it can be difficult to tell if you feel nauseous, you may just feel bad and not associate that feeling with nausea. Constipation, diarrhea, and a fever may go along with the pain.  The pain may continue even if treated correctly over the following days. Depending on how things go, sometimes the cause can become clear and may require further or different treatment. Additional evaluations, medications, or tests may also be needed.  Home care  Your healthcare provider may prescribe medicine for pain, symptoms, or an infection.  Follow the healthcare provider's instructions for taking these medicines.  General care  · Rest as much as you can until your next exam. No strenuous activities.  · Try to find positions that ease discomfort. A small pillow placed on the abdomen may help relieve pain.  · Something warm on your abdomen (such as a heating pad) may help, but be careful not to burn yourself.  Diet  · Do not force yourself to eat, especially if having cramps, vomiting, or diarrhea.  · Water is important so you do not get dehydrated. Soup may also be good. Sports drinks may also help, especially if they are not too acidic. Make sure you don't drink  sugary drinks as this can make things worse. Take liquids in small amounts. Do not guzzle them.  · Caffeine sometimes makes the pain and cramping worse.  · Avoid dairy products if you have vomiting or diarrhea.  · Don't eat large amounts at a time. Wait a few minutes between bites.  · Eat a diet low in fiber (called a low-residue diet). Foods allowed include refined breads, white rice, fruit and vegetable juices without pulp, tender meats. These foods will pass more easily through the intestine.  · Avoid whole-grain foods, whole fruits and vegetables, meats, seeds and nuts, fried or fatty foods, dairy, alcohol and spicy foods until your symptoms go away.  Follow-up care  Follow up with your healthcare provider, or as advised, if your pain does not begin to improve in the next 24 hours.  Call 911  Call 911 if any of these occur:  · Trouble breathing  · Confusion  · Fainting or loss of consciousness  · Rapid heart rate  · Seizure  When to seek medical advice  Call your healthcare provider right away if any of these occur:  · Pain gets worse or moves to the right lower abdomen  · New or worsening vomiting or diarrhea  · Swelling of the abdomen  · Unable to pass stool for more than 3 days  · Fever of 100.4ºF (38ºC) or higher, or as directed by your healthcare provider.  · Blood in vomit or bowel movements (dark red or black color)  · Jaundice (yellow color of eyes and skin)  · Weakness, dizziness  · Chest, arm, back, neck or jaw pain  · Unexpected vaginal bleeding or missed period  · Can't keep down liquids or water and are getting dehydrated  Date Last Reviewed: 12/30/2015  © 7872-4072 [employer]. [address] [place] [address], [place] [address]. All rights reserved. This information is not intended as a substitute for professional medical care. Always follow your healthcare professional's instructions.        Unknown Causes of Abdominal Pain (Female)    The exact cause of your abdominal (stomach) pain is not  clear. This does not mean that this is something to worry about. Everyone likes to know the exact cause of the problem, but sometimes with abdominal pain, there is no clear-cut cause, and this could be a good thing. The good news is that your symptoms can be treated, and you will feel better.   Your condition does not seem serious now; however, sometimes the signs of a serious problem may take more time to appear. For this reason, it is important for you to watch for any new symptoms, problems, or worsening of your condition.  Over the next few days, the abdominal pain may come and go, or be continuous. Other common symptoms can include nausea and vomiting. Sometimes it can be difficult to tell if you feel nauseous, you may just feel bad and not associate that feeling with nausea. Constipation, diarrhea, and a fever may go along with the pain.  The pain may continue even if treated correctly over the following days. Depending on how things go, sometimes the cause can become clear and may require further or different treatment. Additional evaluations, medications, or tests may also be needed.  Home care  Your healthcare provider may prescribe medicine for pain, symptoms, or an infection.  Follow the healthcare provider's instructions for taking these medicines.  General care  · Rest as much as you can until your next exam. No strenuous activities.  · Try to find positions that ease discomfort. A small pillow placed on the abdomen may help relieve pain.  · Something warm on your abdomen (such as a heating pad) may help, but be careful not to burn yourself.  Diet  · Do not force yourself to eat, especially if having cramps, vomiting, or diarrhea.  · Water is important so you do not get dehydrated. Soup may also be good. Sports drinks may also help, especially if they are not too acidic. Make sure you don't drink sugary drinks as this can make things worse. Take liquids in small amounts. Do not guzzle them.  · Caffeine  sometimes makes the pain and cramping worse.  · Avoid dairy products if you have vomiting or diarrhea.  · Don't eat large amounts at a time. Wait a few minutes between bites.  · Eat a diet low in fiber (called a low-residue diet). Foods allowed include refined breads, white rice, fruit and vegetable juices without pulp, tender meats. These foods will pass more easily through the intestine.  · Avoid whole-grain foods, whole fruits and vegetables, meats, seeds and nuts, fried or fatty foods, dairy, alcohol and spicy foods until your symptoms go away.  Follow-up care  Follow up with your healthcare provider, or as advised, if your pain does not begin to improve in the next 24 hours.  Call 911  Call 911 if any of these occur:  · Trouble breathing  · Confusion  · Fainting or loss of consciousness  · Rapid heart rate  · Seizure  When to seek medical advice  Call your healthcare provider right away if any of these occur:  · Pain gets worse or moves to the right lower abdomen  · New or worsening vomiting or diarrhea  · Swelling of the abdomen  · Unable to pass stool for more than 3 days  · Fever of 100.4ºF (38ºC) or higher, or as directed by your healthcare provider.  · Blood in vomit or bowel movements (dark red or black color)  · Jaundice (yellow color of eyes and skin)  · Weakness, dizziness  · Chest, arm, back, neck or jaw pain  · Unexpected vaginal bleeding or missed period  · Can't keep down liquids or water and are getting dehydrated  Date Last Reviewed: 12/30/2015  © 2256-1759 [employer]. [address] [place] [address], [place] [address]. All rights reserved. This information is not intended as a substitute for professional medical care. Always follow your healthcare professional's instructions.        Unknown Causes of Abdominal Pain (Female)    The exact cause of your abdominal (stomach) pain is not clear. This does not mean that this is something to worry about. Everyone likes to know the exact cause of  the problem, but sometimes with abdominal pain, there is no clear-cut cause, and this could be a good thing. The good news is that your symptoms can be treated, and you will feel better.   Your condition does not seem serious now; however, sometimes the signs of a serious problem may take more time to appear. For this reason, it is important for you to watch for any new symptoms, problems, or worsening of your condition.  Over the next few days, the abdominal pain may come and go, or be continuous. Other common symptoms can include nausea and vomiting. Sometimes it can be difficult to tell if you feel nauseous, you may just feel bad and not associate that feeling with nausea. Constipation, diarrhea, and a fever may go along with the pain.  The pain may continue even if treated correctly over the following days. Depending on how things go, sometimes the cause can become clear and may require further or different treatment. Additional evaluations, medications, or tests may also be needed.  Home care  Your healthcare provider may prescribe medicine for pain, symptoms, or an infection.  Follow the healthcare provider's instructions for taking these medicines.  General care  · Rest as much as you can until your next exam. No strenuous activities.  · Try to find positions that ease discomfort. A small pillow placed on the abdomen may help relieve pain.  · Something warm on your abdomen (such as a heating pad) may help, but be careful not to burn yourself.  Diet  · Do not force yourself to eat, especially if having cramps, vomiting, or diarrhea.  · Water is important so you do not get dehydrated. Soup may also be good. Sports drinks may also help, especially if they are not too acidic. Make sure you don't drink sugary drinks as this can make things worse. Take liquids in small amounts. Do not guzzle them.  · Caffeine sometimes makes the pain and cramping worse.  · Avoid dairy products if you have vomiting or  diarrhea.  · Don't eat large amounts at a time. Wait a few minutes between bites.  · Eat a diet low in fiber (called a low-residue diet). Foods allowed include refined breads, white rice, fruit and vegetable juices without pulp, tender meats. These foods will pass more easily through the intestine.  · Avoid whole-grain foods, whole fruits and vegetables, meats, seeds and nuts, fried or fatty foods, dairy, alcohol and spicy foods until your symptoms go away.  Follow-up care  Follow up with your healthcare provider, or as advised, if your pain does not begin to improve in the next 24 hours.  Call 911  Call 911 if any of these occur:  · Trouble breathing  · Confusion  · Fainting or loss of consciousness  · Rapid heart rate  · Seizure  When to seek medical advice  Call your healthcare provider right away if any of these occur:  · Pain gets worse or moves to the right lower abdomen  · New or worsening vomiting or diarrhea  · Swelling of the abdomen  · Unable to pass stool for more than 3 days  · Fever of 100.4ºF (38ºC) or higher, or as directed by your healthcare provider.  · Blood in vomit or bowel movements (dark red or black color)  · Jaundice (yellow color of eyes and skin)  · Weakness, dizziness  · Chest, arm, back, neck or jaw pain  · Unexpected vaginal bleeding or missed period  · Can't keep down liquids or water and are getting dehydrated  Date Last Reviewed: 12/30/2015  © 0823-5992 [employer]. [address] [place] [address], [place] [address]. All rights reserved. This information is not intended as a substitute for professional medical care. Always follow your healthcare professional's instructions.        Understanding Kidney Stones  Your kidneys are bean-shaped organs. They help filter extra salts, waste, and water from your body. You need to drink enough water every day to help flush the extra salts into your urine.     What are kidney stones?  Kidney stones are made up of chemical crystals that  separate out from urine. These crystals clump together to make stones. They form in the calyx of the kidney. They may stay in the kidney or move into the urinary tract.   Why kidney stones form  Kidneys form stones for many reasons. If you dont drink enough water, for instance, you wont have enough urine to dilute chemicals. Then the chemicals may form crystals, which can develop into stones. Here are some reasons why kidney stones form:  · Fluid loss (dehydration). This can concentrate urine, causing stones to form.  · Certain foods. Some foods contain large amounts of the chemicals that sometimes crystallize into stones. Eating foods that contain a lot of meat or salt can lead to more kidney stones.  · Kidney infections. These infections foster stones by slowing urine flow or changing the acid balance of your urine.  · Family history. If family members have had kidney stones, youre more likely to have them, too.  · A lack of certain substances in your urine. Some substances can help protect you from forming stones. If you dont have enough of these in your urine, stone formation can increase.  Where stones form  Stones begin in the cup-shaped part of the kidney (calyx). Some stay in the calyx and grow. Others move into the kidney, pelvis, or into the ureter. There they can lodge, block the flow of urine, and cause pain.  Symptoms  Many stones cause sudden and severe pain and bloody urine. Others cause nausea or frequent, burning urination. Symptoms often depend on your stones size and location. Fever may indicate a serious infection. Call your healthcare provider right away if you develop a fever.  Date Last Reviewed: 1/1/2017 © 2000-2017 [employer]. [address] [place] [address], [place] [address]. All rights reserved. This information is not intended as a substitute for professional medical care. Always follow your healthcare professional's instructions.        Treating Kidney Stones: Expectant  Therapy  Most kidney stones are about the size of a grape seed. Stones of this size are small enough to pass naturally. Once it is passed, a stone can be analyzed. This wait-and-see approach is called expectant therapy. Small stones can often be passed with expectant therapy. If pain is a problem, ask your healthcare provider about pain medicines. Then follow his or her directions on how much water to drink. Drinking more water creates more urine to flush out your stone. Also be sure to strain your urine. Take any stones you pass to your provider for analysis.    Drink lots of water  Drinking lots of water may help your stone pass. Water also dilutes the chemicals in your urine. This reduces your risk of forming new stones. You may be told to drink 8, 12-ounce glasses of water a day. Avoid liquids that dehydrate you, such as those containing caffeine or alcohol.  Strain your urine  Straining your urine lets you collect your stone for analysis. Use the strainer each time you urinate. Strain your urine for as long as your healthcare provider suggests. Watch for brown, tan, gold, or black specks or tiny katie. These may be kidney stones.  Take your medicine  Your healthcare provider may give you medicine that makes it more likely for you to pass the kidney stone.   Follow up with your healthcare provider  Follow up by taking any stones you find to your provider for analysis. The type of stone you have determines your diet and prevention program. You may need more tests in the future. These tests will ensure that new stones are not forming.  Date Last Reviewed: 1/1/2017  © 2035-2353 [employer]. [address] [place] [address], [place] [address]. All rights reserved. This information is not intended as a substitute for professional medical care. Always follow your healthcare professional's instructions.

## 2020-02-16 NOTE — PROGRESS NOTES
"Subjective:       Patient ID: Marilyn Salazar is a 19 y.o. female.    Vitals:  height is 5' 1" (1.549 m) and weight is 59.9 kg (132 lb 2.7 oz). Her oral temperature is 99.1 °F (37.3 °C). Her blood pressure is 120/85 and her pulse is 81. Her respiration is 12 and oxygen saturation is 100%.     Chief Complaint: Abdominal Pain    Ab pain x3 days. OTC Meds not helping. States she feels like she has to have a bowel movement . She has had " a little bit of diarrhea" over the last few days but feels like she still needs to go. Can't remember the last time she had a BM prior to this episode. Had a negative EGD last year.  Rates pain 3/10.     Abdominal Pain   This is a new problem. The current episode started in the past 7 days. The onset quality is gradual. The problem occurs constantly. The problem has been gradually worsening. The pain is located in the generalized abdominal region. The pain is at a severity of 5/10. The quality of the pain is cramping, a sensation of fullness, aching and dull. The abdominal pain radiates to the suprapubic region. Associated symptoms include constipation, diarrhea and nausea. Pertinent negatives include no dysuria, fever or vomiting. Nothing aggravates the pain. The pain is relieved by nothing. Treatments tried: milk thistle,  The treatment provided no relief. There is no history of abdominal surgery.       Constitution: Positive for appetite change. Negative for chills, sweating and fever.   HENT: Negative for trouble swallowing.    Cardiovascular: Negative for chest pain.   Respiratory: Negative for shortness of breath.    Gastrointestinal: Positive for abdominal pain, nausea, constipation and diarrhea. Negative for abdominal trauma, abdominal bloating, history of abdominal surgery, vomiting, dark colored stools and heartburn.   Genitourinary: Negative for dysuria, missed menses and pelvic pain.   Musculoskeletal: Positive for back pain.       Objective:      Physical Exam "   Constitutional: She is oriented to person, place, and time. She appears well-developed and well-nourished.   Eyes: Conjunctivae are normal.   Neck: Normal range of motion.   Cardiovascular: Normal rate.   Pulmonary/Chest: Effort normal. No respiratory distress.   Abdominal: Soft. She exhibits no distension, no fluid wave and no ascites. Bowel sounds are decreased. There is tenderness in the left upper quadrant. There is no rigidity, no rebound, no guarding, no CVA tenderness, no tenderness at McBurney's point and negative Sarmiento's sign.   Musculoskeletal: Normal range of motion.   Lymphadenopathy:     She has no cervical adenopathy.   Neurological: She is alert and oriented to person, place, and time.   Skin: Skin is warm and dry.   Psychiatric: She has a normal mood and affect. Her behavior is normal.   Vitals reviewed.        Assessment:       1. Abdominal pain, unspecified abdominal location    2. Kidney stone        Plan:         Abdominal pain, unspecified abdominal location  -     X-Ray Abdomen Flat And Erect; Future; Expected date: 02/16/2020  -     ondansetron (ZOFRAN-ODT) 4 MG TbDL; Take 1 tablet (4 mg total) by mouth every 8 (eight) hours as needed (nausea).  Dispense: 20 tablet; Refill: 0    Kidney stone  -     tamsulosin (FLOMAX) 0.4 mg Cap; Take 1 capsule (0.4 mg total) by mouth once daily. for 14 days  Dispense: 14 capsule; Refill: 0         X ray show 4 mm left kidney stone. Patient given strainer and advised to strain urine.  If worsening pain to ED. Ibuprofen for pain.

## 2020-02-19 ENCOUNTER — TELEPHONE (OUTPATIENT)
Dept: URGENT CARE | Facility: CLINIC | Age: 20
End: 2020-02-19

## 2020-03-04 ENCOUNTER — OFFICE VISIT (OUTPATIENT)
Dept: INTERNAL MEDICINE | Facility: CLINIC | Age: 20
End: 2020-03-04
Payer: OTHER GOVERNMENT

## 2020-03-04 VITALS
HEART RATE: 77 BPM | OXYGEN SATURATION: 98 % | DIASTOLIC BLOOD PRESSURE: 70 MMHG | HEIGHT: 61 IN | SYSTOLIC BLOOD PRESSURE: 122 MMHG | WEIGHT: 127.88 LBS | BODY MASS INDEX: 24.15 KG/M2

## 2020-03-04 DIAGNOSIS — N20.0 KIDNEY STONE: ICD-10-CM

## 2020-03-04 DIAGNOSIS — R10.13 DYSPEPSIA: ICD-10-CM

## 2020-03-04 DIAGNOSIS — R07.89 NON-CARDIAC CHEST PAIN: ICD-10-CM

## 2020-03-04 DIAGNOSIS — K21.9 GASTROESOPHAGEAL REFLUX DISEASE, ESOPHAGITIS PRESENCE NOT SPECIFIED: ICD-10-CM

## 2020-03-04 DIAGNOSIS — N20.0 NEPHROLITHIASIS: Primary | ICD-10-CM

## 2020-03-04 DIAGNOSIS — R10.9 FLANK PAIN: ICD-10-CM

## 2020-03-04 PROCEDURE — 99999 PR PBB SHADOW E&M-EST. PATIENT-LVL III: CPT | Mod: PBBFAC,,, | Performed by: INTERNAL MEDICINE

## 2020-03-04 PROCEDURE — 99214 PR OFFICE/OUTPT VISIT, EST, LEVL IV, 30-39 MIN: ICD-10-PCS | Mod: S$GLB,,, | Performed by: INTERNAL MEDICINE

## 2020-03-04 PROCEDURE — 99999 PR PBB SHADOW E&M-EST. PATIENT-LVL III: ICD-10-PCS | Mod: PBBFAC,,, | Performed by: INTERNAL MEDICINE

## 2020-03-04 PROCEDURE — 99214 OFFICE O/P EST MOD 30 MIN: CPT | Mod: S$GLB,,, | Performed by: INTERNAL MEDICINE

## 2020-03-04 RX ORDER — TAMSULOSIN HYDROCHLORIDE 0.4 MG/1
0.4 CAPSULE ORAL DAILY
Qty: 14 CAPSULE | Refills: 0 | Status: SHIPPED | OUTPATIENT
Start: 2020-03-04 | End: 2020-05-05 | Stop reason: ALTCHOICE

## 2020-03-04 RX ORDER — CIPROFLOXACIN 500 MG/1
500 TABLET ORAL 2 TIMES DAILY
Qty: 14 TABLET | Refills: 0 | Status: SHIPPED | OUTPATIENT
Start: 2020-03-04 | End: 2020-03-11

## 2020-03-04 RX ORDER — DICYCLOMINE HYDROCHLORIDE 10 MG/1
10 CAPSULE ORAL
Qty: 30 CAPSULE | Refills: 1 | Status: SHIPPED | OUTPATIENT
Start: 2020-03-04 | End: 2020-05-05 | Stop reason: ALTCHOICE

## 2020-03-04 NOTE — PROGRESS NOTES
Subjective:       Patient ID: Marilyn Salazar is a 19 y.o. female.    Chief Complaint: Nausea (having stomach pain and nausea went to urgent care xray done kidney stones dx.pt states she is feeling bad again  )    HPI  Pt went to ER on 2/16 with epigastric and LUQ abd pain with nausea, found to have a possible kidney stone on KUB in renal pelvis.  Still with some abd discomfort.  Review of Systems   All other systems reviewed and are negative.      Objective:      Physical Exam   Constitutional: She appears well-developed.   HENT:   Head: Normocephalic.   Eyes: EOM are normal.   Neck: Normal range of motion.   Cardiovascular: Normal rate, regular rhythm, normal heart sounds and intact distal pulses.   Pulmonary/Chest: Effort normal and breath sounds normal.   Abdominal: Soft. Bowel sounds are normal. She exhibits no distension and no mass. There is tenderness (LUQ). There is no guarding.   Musculoskeletal: Normal range of motion. She exhibits no edema.   Lymphadenopathy:     She has no cervical adenopathy.   Neurological: She is alert. No cranial nerve deficit. She exhibits normal muscle tone. Coordination normal.   Skin: Skin is warm and dry.   Psychiatric: She has a normal mood and affect. Her behavior is normal.   Vitals reviewed.      Assessment:       1. Nephrolithiasis    2. Flank pain    3. Kidney stone    4. Gastroesophageal reflux disease, esophagitis presence not specified    5. Non-cardiac chest pain    6. Dyspepsia        Plan:       Marilyn was seen today for nausea.    Diagnoses and all orders for this visit:    Nephrolithiasis  -     POCT urine dipstick without microscope  -     CT Abdomen Pelvis  Without Contrast; Future  -     ciprofloxacin HCl (CIPRO) 500 MG tablet; Take 1 tablet (500 mg total) by mouth 2 (two) times daily. for 7 days    Flank pain  -     CT Abdomen Pelvis  Without Contrast; Future  -     ciprofloxacin HCl (CIPRO) 500 MG tablet; Take 1 tablet (500 mg total) by mouth 2 (two) times  daily. for 7 days    Kidney stone  -     tamsulosin (FLOMAX) 0.4 mg Cap; Take 1 capsule (0.4 mg total) by mouth once daily. for 14 days        Follow up if symptoms worsen or fail to improve.

## 2020-03-05 ENCOUNTER — TELEPHONE (OUTPATIENT)
Dept: INTERNAL MEDICINE | Facility: CLINIC | Age: 20
End: 2020-03-05

## 2020-03-09 ENCOUNTER — TELEPHONE (OUTPATIENT)
Dept: INTERNAL MEDICINE | Facility: CLINIC | Age: 20
End: 2020-03-09

## 2020-03-11 ENCOUNTER — TELEPHONE (OUTPATIENT)
Dept: INTERNAL MEDICINE | Facility: CLINIC | Age: 20
End: 2020-03-11

## 2020-03-11 ENCOUNTER — TELEPHONE (OUTPATIENT)
Dept: RADIOLOGY | Facility: HOSPITAL | Age: 20
End: 2020-03-11

## 2020-03-11 DIAGNOSIS — R11.0 NAUSEA: Primary | ICD-10-CM

## 2020-03-11 RX ORDER — ONDANSETRON 4 MG/1
4 TABLET, ORALLY DISINTEGRATING ORAL EVERY 6 HOURS PRN
Qty: 12 TABLET | Refills: 1 | Status: SHIPPED | OUTPATIENT
Start: 2020-03-11 | End: 2020-05-05

## 2020-03-11 NOTE — TELEPHONE ENCOUNTER
----- Message from Marleen Christianson sent at 3/11/2020 10:38 AM CDT -----  Contact: [phone]  Patient is requesting a call back concerning the pt is Nauseated and would like to know if the Dr can prescribe her something to help with this. Please call

## 2020-03-12 ENCOUNTER — PATIENT MESSAGE (OUTPATIENT)
Dept: INTERNAL MEDICINE | Facility: CLINIC | Age: 20
End: 2020-03-12

## 2020-03-12 ENCOUNTER — HOSPITAL ENCOUNTER (OUTPATIENT)
Dept: RADIOLOGY | Facility: HOSPITAL | Age: 20
Discharge: HOME | End: 2020-03-12
Attending: INTERNAL MEDICINE
Payer: OTHER GOVERNMENT

## 2020-03-12 ENCOUNTER — TELEPHONE (OUTPATIENT)
Dept: INTERNAL MEDICINE | Facility: CLINIC | Age: 20
End: 2020-03-12

## 2020-03-12 DIAGNOSIS — R10.9 FLANK PAIN: ICD-10-CM

## 2020-03-12 DIAGNOSIS — N20.0 NEPHROLITHIASIS: Primary | ICD-10-CM

## 2020-03-12 DIAGNOSIS — N20.0 NEPHROLITHIASIS: ICD-10-CM

## 2020-03-12 PROCEDURE — 74176 CT ABD & PELVIS W/O CONTRAST: CPT | Mod: TC

## 2020-03-12 PROCEDURE — 74176 CT ABD & PELVIS W/O CONTRAST: CPT | Mod: 26,,, | Performed by: RADIOLOGY

## 2020-03-12 PROCEDURE — 74176 CT RENAL STONE STUDY ABD PELVIS WO: ICD-10-PCS | Mod: 26,,, | Performed by: RADIOLOGY

## 2020-03-16 ENCOUNTER — TELEPHONE (OUTPATIENT)
Dept: INTERNAL MEDICINE | Facility: CLINIC | Age: 20
End: 2020-03-16

## 2020-03-17 DIAGNOSIS — N20.0 NEPHROLITHIASIS: Primary | ICD-10-CM

## 2020-05-05 ENCOUNTER — OFFICE VISIT (OUTPATIENT)
Dept: UROLOGY | Facility: CLINIC | Age: 20
End: 2020-05-05
Payer: OTHER GOVERNMENT

## 2020-05-05 VITALS
HEART RATE: 79 BPM | TEMPERATURE: 99 F | BODY MASS INDEX: 24.05 KG/M2 | SYSTOLIC BLOOD PRESSURE: 118 MMHG | WEIGHT: 127.38 LBS | HEIGHT: 61 IN | DIASTOLIC BLOOD PRESSURE: 78 MMHG

## 2020-05-05 DIAGNOSIS — N23 RENAL COLIC ON LEFT SIDE: ICD-10-CM

## 2020-05-05 DIAGNOSIS — N13.30 HYDRONEPHROSIS OF LEFT KIDNEY: ICD-10-CM

## 2020-05-05 DIAGNOSIS — N20.0 NEPHROLITHIASIS: ICD-10-CM

## 2020-05-05 DIAGNOSIS — Z01.818 PREOP EXAMINATION: Primary | ICD-10-CM

## 2020-05-05 DIAGNOSIS — N20.1 URETERAL CALCULUS: ICD-10-CM

## 2020-05-05 PROCEDURE — 99204 OFFICE O/P NEW MOD 45 MIN: CPT | Mod: S$GLB,,, | Performed by: UROLOGY

## 2020-05-05 PROCEDURE — 99204 PR OFFICE/OUTPT VISIT, NEW, LEVL IV, 45-59 MIN: ICD-10-PCS | Mod: S$GLB,,, | Performed by: UROLOGY

## 2020-05-05 PROCEDURE — 99999 PR PBB SHADOW E&M-EST. PATIENT-LVL V: ICD-10-PCS | Mod: PBBFAC,,, | Performed by: UROLOGY

## 2020-05-05 PROCEDURE — 99999 PR PBB SHADOW E&M-EST. PATIENT-LVL V: CPT | Mod: PBBFAC,,, | Performed by: UROLOGY

## 2020-05-05 RX ORDER — CIPROFLOXACIN 2 MG/ML
400 INJECTION, SOLUTION INTRAVENOUS
Status: CANCELLED | OUTPATIENT
Start: 2020-05-05

## 2020-05-05 RX ORDER — SODIUM CHLORIDE 9 MG/ML
INJECTION, SOLUTION INTRAVENOUS CONTINUOUS
Status: CANCELLED | OUTPATIENT
Start: 2020-05-05

## 2020-05-05 NOTE — PROGRESS NOTES
Subjective:       Patient ID: Marilyn Salazar is a 19 y.o. female.    Chief Complaint: Nephrolithiasis    20 yo AAF presents with left flank pain x 11 weeks. Pain has not resolved. Found to have a left distal ureteral calculus and hydronephrosis. Pt also has a Left lower pole calculus. Medicated with Flomax and Pyridium  with no passage of stone. Presents for treatment. Initially had nausea and vomiting that resolved.  Flank Pain   This is a chronic problem. The current episode started more than 1 month ago. The problem occurs constantly. The problem is unchanged. The pain is present in the costovertebral angle. The quality of the pain is described as stabbing and cramping. Radiates to: Radiates from CVAT to left lower quadrant. The pain is at a severity of 6/10. The pain is moderate. The pain is the same all the time. Associated symptoms include abdominal pain, dysuria and pelvic pain. Pertinent negatives include no bladder incontinence, bowel incontinence, chest pain, fever, headaches, leg pain, numbness, paresis, paresthesias, perianal numbness, tingling, weakness or weight loss. Risk factors include history of cancer. Treatments tried: Flomax and Pyridium. The treatment provided mild relief.     Review of Systems   Constitutional: Negative for activity change, appetite change, chills, fatigue, fever and weight loss.   HENT: Negative for congestion, ear pain, hearing loss, nosebleeds, sinus pressure, sore throat and trouble swallowing.    Eyes: Negative for pain and visual disturbance.   Respiratory: Negative for apnea, cough and shortness of breath.    Cardiovascular: Negative for chest pain and leg swelling.   Gastrointestinal: Positive for abdominal pain. Negative for abdominal distention, anal bleeding, blood in stool, bowel incontinence, constipation, diarrhea, nausea, rectal pain and vomiting.   Endocrine: Negative for cold intolerance, heat intolerance, polydipsia, polyphagia and polyuria.    Genitourinary: Positive for dysuria, flank pain and pelvic pain. Negative for bladder incontinence, decreased urine volume, difficulty urinating, dyspareunia, enuresis, frequency, genital sores, hematuria, menstrual problem, urgency, vaginal bleeding, vaginal discharge and vaginal pain.   Musculoskeletal: Negative for arthralgias and back pain.   Skin: Negative for color change, pallor and rash.   Allergic/Immunologic: Negative for environmental allergies, food allergies and immunocompromised state.   Neurological: Negative for dizziness, tingling, speech difficulty, weakness, numbness, headaches and paresthesias.   Hematological: Negative for adenopathy. Does not bruise/bleed easily.   Psychiatric/Behavioral: Negative.        Objective:      Physical Exam   Nursing note and vitals reviewed.  Constitutional: She is oriented to person, place, and time. She appears well-developed and well-nourished.   HENT:   Head: Normocephalic.   Nose: Nose normal.   Mouth/Throat: Oropharynx is clear and moist.   Eyes: Conjunctivae and EOM are normal. Pupils are equal, round, and reactive to light.   Neck: Normal range of motion. Neck supple.   Cardiovascular: Normal rate, regular rhythm, normal heart sounds and intact distal pulses.    Pulmonary/Chest: Effort normal and breath sounds normal.   Abdominal: Soft. Bowel sounds are normal.   Genitourinary:   Genitourinary Comments: Left CVAT   Musculoskeletal: Normal range of motion.   Neurological: She is alert and oriented to person, place, and time. She has normal reflexes.   Skin: Skin is warm and dry.     Psychiatric: She has a normal mood and affect. Her behavior is normal. Judgment and thought content normal.       Assessment:       1. Nephrolithiasis    2. Ureteral calculus    3. Hydronephrosis of left kidney    4. Renal colic on left side        Plan:       Patient Instructions   Plan Left Ureteroscopy and Stone extraction and stent placement-  5/7/20  Cipro x 5 days  ESWL  and stent removal 1 week later

## 2020-05-05 NOTE — PATIENT INSTRUCTIONS
Plan Left Ureteroscopy and Stone extraction and stent placement-  5/7/20  Cipro x 5 days  ESWL and stent removal 1 week later

## 2020-05-05 NOTE — LETTER
May 5, 2020      Myles Anthony MD  [address] [place] [address]  [address]  [facility] [address]           [facility]  [address] [place] [address], [address] [address]  Phone: [phone]  Fax: [phone]          Patient: Marilyn Salazar   MR Number: [id number]   YOB: 2000   Date of Visit: 5/5/2020       Dear Dr. Myles Anthony:    Thank you for referring Marilyn Salazar to me for evaluation. Attached you will find relevant portions of my assessment and plan of care.    If you have questions, please do not hesitate to call me. I look forward to following Marilyn Salazar along with you.    Sincerely,    Anoop Aguilar MD    Enclosure  CC:  No Recipients    If you would like to receive this communication electronically, please contact [email or web address] or [phone] to request more information on [facility] access.    For providers and/or their staff who would like to refer a patient to [facility], please contact us through our one-stop-shop provider referral line, [facility], at [phone].    If you feel you have received this communication in error or would no longer like to receive these types of communications, please e-mail [email or web address]

## 2020-05-05 NOTE — PROGRESS NOTES
Subjective:       Patient ID: Marilyn Salazar is a 19 y.o. female.    Chief Complaint: Nephrolithiasis    HPI  Review of Systems    Objective:      Physical Exam    Assessment:       1. Nephrolithiasis        Plan:       ***

## 2020-05-07 PROBLEM — N20.0 NEPHROLITHIASIS: Status: ACTIVE | Noted: 2020-05-07

## 2020-05-08 ENCOUNTER — PATIENT MESSAGE (OUTPATIENT)
Dept: SPORTS MEDICINE | Facility: CLINIC | Age: 20
End: 2020-05-08

## 2020-05-20 ENCOUNTER — NURSE TRIAGE (OUTPATIENT)
Dept: ADMINISTRATIVE | Facility: CLINIC | Age: 20
End: 2020-05-20

## 2020-05-20 NOTE — TELEPHONE ENCOUNTER
Spoke with patient on behalf of Post Procedural Symptom tracker and she denies any cough, fever, or difficulty breathing.     Reason for Disposition   Health Information question, no triage required and triager able to answer question    Additional Information   Negative: [1] Caller is not with the adult (patient) AND [2] reporting urgent symptoms   Negative: Lab result questions   Negative: Medication questions   Negative: Caller can't be reached by phone   Negative: Caller has already spoken to PCP or another triager   Negative: RN needs further essential information from caller in order to complete triage   Negative: Requesting regular office appointment   Negative: [1] Caller requesting NON-URGENT health information AND [2] PCP's office is the best resource    Protocols used: INFORMATION ONLY CALL-A-AH

## 2020-05-21 ENCOUNTER — OFFICE VISIT (OUTPATIENT)
Dept: UROLOGY | Facility: CLINIC | Age: 20
End: 2020-05-21
Payer: OTHER GOVERNMENT

## 2020-05-21 ENCOUNTER — TELEPHONE (OUTPATIENT)
Dept: UROLOGY | Facility: CLINIC | Age: 20
End: 2020-05-21

## 2020-05-21 VITALS
DIASTOLIC BLOOD PRESSURE: 85 MMHG | WEIGHT: 129.88 LBS | BODY MASS INDEX: 24.54 KG/M2 | SYSTOLIC BLOOD PRESSURE: 128 MMHG

## 2020-05-21 DIAGNOSIS — N23 RENAL COLIC ON LEFT SIDE: ICD-10-CM

## 2020-05-21 DIAGNOSIS — N20.1 URETERAL CALCULUS: ICD-10-CM

## 2020-05-21 DIAGNOSIS — N20.0 NEPHROLITHIASIS: ICD-10-CM

## 2020-05-21 DIAGNOSIS — N13.30 HYDRONEPHROSIS OF LEFT KIDNEY: ICD-10-CM

## 2020-05-21 DIAGNOSIS — Z98.890 POST-OPERATIVE STATE: Primary | ICD-10-CM

## 2020-05-21 PROCEDURE — 99024 POSTOP FOLLOW-UP VISIT: CPT | Mod: S$GLB,,, | Performed by: NURSE PRACTITIONER

## 2020-05-21 PROCEDURE — 99999 PR PBB SHADOW E&M-EST. PATIENT-LVL IV: ICD-10-PCS | Mod: PBBFAC,,, | Performed by: NURSE PRACTITIONER

## 2020-05-21 PROCEDURE — 99024 PR POST-OP FOLLOW-UP VISIT: ICD-10-PCS | Mod: S$GLB,,, | Performed by: NURSE PRACTITIONER

## 2020-05-21 PROCEDURE — 99999 PR PBB SHADOW E&M-EST. PATIENT-LVL IV: CPT | Mod: PBBFAC,,, | Performed by: NURSE PRACTITIONER

## 2020-05-21 RX ORDER — METHENAMINE, SODIUM PHOSPHATE, MONOBASIC, ANHYDROUS, PHENYL SALICYLATE, METHYLENE BLUE AND HYOSCYAMINE SULFATE 118; 40.8; 36; 10; .12 MG/1; MG/1; MG/1; MG/1; MG/1
CAPSULE ORAL
COMMUNITY
Start: 2020-05-18 | End: 2021-01-14

## 2020-05-21 NOTE — TELEPHONE ENCOUNTER
----- Message from Cristina Gutierrez NP sent at 5/21/2020  3:31 PM CDT -----  Regarding: FW: stent removal      ----- Message -----  From: Anoop Aguilar MD  Sent: 5/21/2020   2:46 PM CDT  To: Cristina Gutierrez NP  Subject: RE: stent removal                                First week of June in the Clinic. No KUB needed. Thanks  ----- Message -----  From: Cristina Gutierrez NP  Sent: 5/21/2020   1:43 PM CDT  To: Anoop Aguilar MD  Subject: stent removal                                    Lewis KLINE When do you want to remove patient's left ureteral stent? Do you want to do it in the clinic or hospital?

## 2020-05-21 NOTE — LETTER
May 21, 2020      [facility]  [address] [place] [address]  [place] [address]  Phone: [phone]  Fax: [phone]       Patient: Marilyn Salazar   YOB: 2000  Date of Visit: 05/21/2020    To Whom It May Concern:    Mireya Salazar  was at [facility] on 05/21/2020. She may return to work/school on 5/26/2020  With light duty restrictions. If you have any questions or concerns, or if I can be of further assistance, please do not hesitate to contact me.    Sincerely,    Cristina Gutierrez NP

## 2020-05-21 NOTE — LETTER
May 21, 2020      [facility]  [address] [place] [address]  [place] [address]  Phone: [phone]  Fax: [phone]       Patient: Marilyn Salazar   YOB: 2000  Date of Visit: 05/21/2020    To Whom It May Concern:    Mireya Salazar  was at [facility] on 05/21/2020. She may return to work/school on 5/26/2020  With light duty restrictions. If you have any questions or concerns, or if I can be of further assistance, please do not hesitate to contact me.    Sincerely,    Cristina Gutierrez NP

## 2020-05-21 NOTE — PROGRESS NOTES
Subjective:       Patient ID: Marilyn Salazar is a 19 y.o. female.    Chief Complaint: Post-op Evaluation    Patient is new to me. She is a 20 yo AAF who is here today for her post-op evaluation. She is S/P left ureteropyeloscopy with stone extraction and left ureteral stent placement by Dr. Aguilar on 5/7/2020. Patient reports her pain is improving, but she still has some mild pain in left flank and LLQ of abdomen, rating it a 4/10 on pain scale.     Other   Chronicity: left ureter calculus. The current episode started 1 to 4 weeks ago. The problem has been gradually improving. Associated symptoms include abdominal pain (LLQ) and urinary symptoms. Pertinent negatives include no anorexia, arthralgias, change in bowel habit, chills, diaphoresis, fatigue, fever, nausea, swollen glands, vomiting or weakness. Nothing aggravates the symptoms. Treatments tried: S/P left ureteropyeloscopy with stone extraction and left ureteral stent placement. The treatment provided moderate relief.     Review of Systems   Constitutional: Negative for appetite change, chills, diaphoresis, fatigue and fever.   Gastrointestinal: Positive for abdominal pain (LLQ) and diarrhea. Negative for anorexia, change in bowel habit, constipation, nausea and vomiting.   Genitourinary: Positive for dysuria, flank pain (left; 4/10), frequency and urgency. Negative for decreased urine volume, difficulty urinating, hematuria, menstrual problem, pelvic pain, vaginal bleeding, vaginal discharge and vaginal pain.   Musculoskeletal: Negative for arthralgias.   Neurological: Negative for weakness.   Psychiatric/Behavioral: Negative.        Objective:      Physical Exam   Constitutional: She is oriented to person, place, and time. She appears well-developed and well-nourished. No distress.   HENT:   Head: Normocephalic and atraumatic.   Eyes: Pupils are equal, round, and reactive to light. EOM are normal.   Neck: Normal range of motion.   Cardiovascular:  Normal rate.   Pulmonary/Chest: Effort normal. No respiratory distress.   Abdominal: Soft. There is no tenderness.   Musculoskeletal: Normal range of motion. She exhibits no edema.   No bilateral CVAT present.   Neurological: She is alert and oriented to person, place, and time. Coordination normal.   Skin: Skin is warm and dry.   Psychiatric: She has a normal mood and affect. Her behavior is normal. Judgment and thought content normal.   Nursing note and vitals reviewed.      Assessment:       1. Post-operative state    2. Ureteral calculus    3. Hydronephrosis of left kidney    4. Renal colic on left side    5. Nephrolithiasis        Plan:       Marilyn was seen today for post-op evaluation.    Diagnoses and all orders for this visit:    Post-operative state    Ureteral calculus    Hydronephrosis of left kidney    Renal colic on left side    Nephrolithiasis    Other order  1. Schedule in-clinic left ureteral stent removal by Dr. Aguilar 1st week in June.     Cristina Gutierrez NP

## 2020-06-01 ENCOUNTER — TELEPHONE (OUTPATIENT)
Dept: UROLOGY | Facility: CLINIC | Age: 20
End: 2020-06-01

## 2020-06-01 NOTE — TELEPHONE ENCOUNTER
Called patient informed she may have some bleeding when she passes fragments of the stones to make sure that she is using the strainer to collects the stone fragments. She has an appointment tomorrow.

## 2020-06-01 NOTE — TELEPHONE ENCOUNTER
----- Message from Myrna Amato sent at 6/1/2020 10:15 AM CDT -----  Contact: [phone]/Self   Patient is requesting to speak with nurse regarding some bleeding she had yesterday. Please advise.

## 2020-06-02 ENCOUNTER — PROCEDURE VISIT (OUTPATIENT)
Dept: UROLOGY | Facility: CLINIC | Age: 20
End: 2020-06-02
Payer: OTHER GOVERNMENT

## 2020-06-02 VITALS
RESPIRATION RATE: 18 BRPM | HEIGHT: 61 IN | HEART RATE: 74 BPM | BODY MASS INDEX: 24.35 KG/M2 | TEMPERATURE: 98 F | WEIGHT: 129 LBS | OXYGEN SATURATION: 99 % | DIASTOLIC BLOOD PRESSURE: 66 MMHG | SYSTOLIC BLOOD PRESSURE: 126 MMHG

## 2020-06-02 DIAGNOSIS — N20.0 NEPHROLITHIASIS: Primary | ICD-10-CM

## 2020-06-02 DIAGNOSIS — Z46.6 ENCOUNTER FOR REMOVAL OF URETERAL STENT: ICD-10-CM

## 2020-06-02 PROCEDURE — 52310 CYSTOSCOPY: ICD-10-PCS | Mod: S$GLB,,, | Performed by: UROLOGY

## 2020-06-02 PROCEDURE — 52310 CYSTOSCOPY AND TREATMENT: CPT | Mod: S$GLB,,, | Performed by: UROLOGY

## 2020-06-02 NOTE — PROCEDURES
"Cystoscopy  Date/Time: 6/2/2020 2:30 PM  Performed by: Anoop Aguilar MD  Authorized by: Anoop Aguilar MD     Consent Done?:  Yes (Written)  Time out: Immediately prior to procedure a "time out" was called to verify the correct patient, procedure, equipment, support staff and site/side marked as required.    Indications comment:  Encounter for stent removal  Position:  Dorsal lithotomy  Anesthesia:  Intraurethral instillation  Patient sedated?: No    Preparation: Patient was prepped and draped in usual sterile fashion      Scope type:  Flexible cystoscope  Stent inserted: No    Stent removed: Yes    Stent encrusted: No    External exam normal: Yes    Digital exam performed: No    Urethra normal: Yes  Bladder neck normal: Bladder neck normal   Bladder normal: Yes      Patient tolerance:  Patient tolerated the procedure well with no immediate complications      "

## 2020-07-16 ENCOUNTER — TELEPHONE (OUTPATIENT)
Dept: INTERNAL MEDICINE | Facility: CLINIC | Age: 20
End: 2020-07-16

## 2020-07-16 NOTE — TELEPHONE ENCOUNTER
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

## 2020-08-11 NOTE — TELEPHONE ENCOUNTER
----- Message from Camille Nuñez sent at 8/11/2020  3:31 PM CDT -----  Contact: mother/ [phone]  Refill request for ondansetron (ZOFRAN-ODT) 4 MG TbDL   PHARMACY: [id number] - [address] AT [facility] [phone] (Phone)

## 2020-08-12 ENCOUNTER — OFFICE VISIT (OUTPATIENT)
Dept: INTERNAL MEDICINE | Facility: CLINIC | Age: 20
End: 2020-08-12
Payer: OTHER GOVERNMENT

## 2020-08-12 ENCOUNTER — LAB VISIT (OUTPATIENT)
Dept: LAB | Facility: HOSPITAL | Age: 20
End: 2020-08-12
Attending: INTERNAL MEDICINE
Payer: OTHER GOVERNMENT

## 2020-08-12 VITALS
SYSTOLIC BLOOD PRESSURE: 112 MMHG | WEIGHT: 139.25 LBS | BODY MASS INDEX: 26.29 KG/M2 | OXYGEN SATURATION: 99 % | HEART RATE: 101 BPM | HEIGHT: 61 IN | DIASTOLIC BLOOD PRESSURE: 80 MMHG

## 2020-08-12 DIAGNOSIS — Z11.59 NEED FOR HEPATITIS C SCREENING TEST: ICD-10-CM

## 2020-08-12 DIAGNOSIS — N20.0 NEPHROLITHIASIS: ICD-10-CM

## 2020-08-12 DIAGNOSIS — M79.641 BILATERAL HAND PAIN: ICD-10-CM

## 2020-08-12 DIAGNOSIS — M79.642 BILATERAL HAND PAIN: ICD-10-CM

## 2020-08-12 DIAGNOSIS — R42 VERTIGO: Primary | ICD-10-CM

## 2020-08-12 PROBLEM — N13.30 HYDRONEPHROSIS OF LEFT KIDNEY: Status: RESOLVED | Noted: 2020-05-05 | Resolved: 2020-08-12

## 2020-08-12 PROBLEM — N23 RENAL COLIC ON LEFT SIDE: Status: RESOLVED | Noted: 2020-05-05 | Resolved: 2020-08-12

## 2020-08-12 PROBLEM — N20.1 URETERAL CALCULUS: Status: RESOLVED | Noted: 2020-05-05 | Resolved: 2020-08-12

## 2020-08-12 PROBLEM — Z46.6 ENCOUNTER FOR REMOVAL OF URETERAL STENT: Status: RESOLVED | Noted: 2020-06-02 | Resolved: 2020-08-12

## 2020-08-12 LAB
CCP AB SER IA-ACNC: <0.5 U/ML
CRP SERPL-MCNC: 0.08 MG/DL (ref 0–1)
ERYTHROCYTE [SEDIMENTATION RATE] IN BLOOD BY WESTERGREN METHOD: 19 MM/HR (ref 0–20)

## 2020-08-12 PROCEDURE — 86140 C-REACTIVE PROTEIN: CPT | Mod: PO

## 2020-08-12 PROCEDURE — 36415 COLL VENOUS BLD VENIPUNCTURE: CPT | Mod: PO

## 2020-08-12 PROCEDURE — 99999 PR PBB SHADOW E&M-EST. PATIENT-LVL IV: CPT | Mod: PBBFAC,,, | Performed by: INTERNAL MEDICINE

## 2020-08-12 PROCEDURE — 99999 PR PBB SHADOW E&M-EST. PATIENT-LVL IV: ICD-10-PCS | Mod: PBBFAC,,, | Performed by: INTERNAL MEDICINE

## 2020-08-12 PROCEDURE — 86803 HEPATITIS C AB TEST: CPT | Mod: PO

## 2020-08-12 PROCEDURE — 86200 CCP ANTIBODY: CPT | Mod: PO

## 2020-08-12 PROCEDURE — 85652 RBC SED RATE AUTOMATED: CPT

## 2020-08-12 PROCEDURE — 86431 RHEUMATOID FACTOR QUANT: CPT | Mod: PO

## 2020-08-12 PROCEDURE — 99215 PR OFFICE/OUTPT VISIT, EST, LEVL V, 40-54 MIN: ICD-10-PCS | Mod: S$GLB,,, | Performed by: INTERNAL MEDICINE

## 2020-08-12 PROCEDURE — 99215 OFFICE O/P EST HI 40 MIN: CPT | Mod: S$GLB,,, | Performed by: INTERNAL MEDICINE

## 2020-08-12 PROCEDURE — 86038 ANTINUCLEAR ANTIBODIES: CPT | Mod: PO

## 2020-08-12 RX ORDER — ONDANSETRON 4 MG/1
4 TABLET, ORALLY DISINTEGRATING ORAL EVERY 6 HOURS PRN
Qty: 30 TABLET | Refills: 2 | Status: SHIPPED | OUTPATIENT
Start: 2020-08-12 | End: 2021-01-14

## 2020-08-12 RX ORDER — PREDNISONE 20 MG/1
TABLET ORAL
Qty: 14 TABLET | Refills: 0 | Status: SHIPPED | OUTPATIENT
Start: 2020-08-12 | End: 2021-01-14

## 2020-08-12 NOTE — PROGRESS NOTES
Subjective:       Patient ID: Marilyn Salazar is a 20 y.o. female.    Chief Complaint: Dizziness, Hand Pain (both when she wakes up in the morning), Numbness (both), Medication Refill (zofran), and Chest Pain    HPI  Pt c/o 2 weeks of intermittent dizziness, mostly in AM, lasting seconds.  Feels like the room is spinning.  Tipped over and fell once.  Has occ tinnitus.  No hearing loss.  Has some nasal congestion.  No F/C.  Also c/o 2-3 months of pain and stiffness in MCPs and PIPs bilat.  No DIP pain or pain in other joints.  Stiffness worse in AM but can last all day.  No rashes.  Has a FH of RA.  No recent renal colic.  Weight up 12lbs/ 5 mo.  Chart reviewed.  Review of Systems   Constitutional: Negative for activity change and unexpected weight change.   HENT: Negative for hearing loss, rhinorrhea and trouble swallowing.    Eyes: Negative for discharge and visual disturbance.   Respiratory: Negative for chest tightness and wheezing.    Cardiovascular: Positive for chest pain. Negative for palpitations.   Gastrointestinal: Negative for blood in stool, constipation, diarrhea and vomiting.   Endocrine: Negative for polydipsia and polyuria.   Genitourinary: Negative for difficulty urinating, dysuria, hematuria and menstrual problem.   Musculoskeletal: Negative for arthralgias, joint swelling and neck pain.   Neurological: Negative for weakness and headaches.   Psychiatric/Behavioral: Negative for confusion and dysphoric mood.   All other systems reviewed and are negative.      Objective:      Physical Exam  Vitals signs reviewed.   Constitutional:       Appearance: She is well-developed.   HENT:      Head: Normocephalic.      Right Ear: Tympanic membrane, ear canal and external ear normal. There is no impacted cerumen.      Left Ear: Tympanic membrane, ear canal and external ear normal. There is no impacted cerumen.   Eyes:      General: No scleral icterus.     Extraocular Movements: Extraocular movements intact.       Conjunctiva/sclera: Conjunctivae normal.      Pupils: Pupils are equal, round, and reactive to light.   Neck:      Musculoskeletal: Normal range of motion.   Cardiovascular:      Rate and Rhythm: Normal rate and regular rhythm.      Heart sounds: Normal heart sounds.   Pulmonary:      Effort: Pulmonary effort is normal.      Breath sounds: Normal breath sounds.   Abdominal:      General: Abdomen is flat.   Musculoskeletal: Normal range of motion.         General: No swelling or tenderness.      Right lower leg: No edema.      Left lower leg: No edema.   Lymphadenopathy:      Cervical: No cervical adenopathy.   Skin:     General: Skin is warm and dry.   Neurological:      General: No focal deficit present.      Mental Status: She is alert.      Cranial Nerves: No cranial nerve deficit.      Motor: No abnormal muscle tone.      Coordination: Coordination normal.   Psychiatric:         Mood and Affect: Mood normal.         Behavior: Behavior normal.         Assessment:       1. Vertigo    2. Nephrolithiasis    3. Bilateral hand pain    4. Need for hepatitis C screening test        Plan:       Marilyn was seen today for dizziness, hand pain, numbness, medication refill and chest pain.    Diagnoses and all orders for this visit:    Vertigo  -     predniSONE (DELTASONE) 20 MG tablet; 2 tabs po qd x 7 days    Nephrolithiasis   Quiescent    Bilateral hand pain  -     C-Reactive Protein; Future  -     Cyclic Citrullinated Peptide Antibody, IgG; Future  -     Sedimentation rate; Future  -     SHERRI Screen w/Reflex; Future  -     Rheumatoid factor; Future    Need for hepatitis C screening test  -     Hepatitis C Antibody; Future      Follow up if symptoms worsen or fail to improve.

## 2020-08-13 LAB — RHEUMATOID FACT SERPL-ACNC: <10 IU/ML (ref 0–15)

## 2020-08-14 LAB
ANA SER QL IF: NORMAL
HCV AB SERPL QL IA: NEGATIVE

## 2020-08-26 ENCOUNTER — TELEPHONE (OUTPATIENT)
Dept: RHEUMATOLOGY | Facility: CLINIC | Age: 20
End: 2020-08-26

## 2020-08-26 NOTE — TELEPHONE ENCOUNTER
----- Message from Vandana Desouza sent at 8/26/2020 11:59 AM CDT -----  Regarding: New Patient Appt  Contact: Patient  Type:  New Patient Appointment Request    Name of Caller: edith Kat  When is the first available appointment?  N/A  Symptoms:    Best Call Back Number:  [phone]  Additional Information:     Mom requesting call from [place] to discuss scheduling.

## 2020-08-26 NOTE — TELEPHONE ENCOUNTER
----- Message from Sandra Carrillo sent at 8/26/2020  5:02 PM CDT -----  Regarding: pt  Contact: pt    Pt was returning missed call from 8/26/2020    Pt can be reached at  [phone]

## 2020-08-26 NOTE — TELEPHONE ENCOUNTER
Returned patient call scheduled new patient appointment added to wait list. Patient aware of date and time of appointment.

## 2020-08-26 NOTE — TELEPHONE ENCOUNTER
Attempted to return mom's call regarding new patient appointment. Left a message to contact office.

## 2020-09-18 ENCOUNTER — PATIENT OUTREACH (OUTPATIENT)
Dept: ADMINISTRATIVE | Facility: OTHER | Age: 20
End: 2020-09-18

## 2020-09-21 ENCOUNTER — HOSPITAL ENCOUNTER (OUTPATIENT)
Dept: RADIOLOGY | Facility: HOSPITAL | Age: 20
Discharge: HOME | End: 2020-09-21
Attending: ORTHOPAEDIC SURGERY
Payer: OTHER GOVERNMENT

## 2020-09-21 ENCOUNTER — OFFICE VISIT (OUTPATIENT)
Dept: SPORTS MEDICINE | Facility: CLINIC | Age: 20
End: 2020-09-21
Payer: OTHER GOVERNMENT

## 2020-09-21 VITALS
SYSTOLIC BLOOD PRESSURE: 127 MMHG | HEART RATE: 86 BPM | DIASTOLIC BLOOD PRESSURE: 84 MMHG | HEIGHT: 61 IN | WEIGHT: 135 LBS | BODY MASS INDEX: 25.49 KG/M2

## 2020-09-21 DIAGNOSIS — M25.561 PAIN IN BOTH KNEES, UNSPECIFIED CHRONICITY: Primary | ICD-10-CM

## 2020-09-21 DIAGNOSIS — M25.561 PAIN IN BOTH KNEES, UNSPECIFIED CHRONICITY: ICD-10-CM

## 2020-09-21 DIAGNOSIS — M25.562 PAIN IN BOTH KNEES, UNSPECIFIED CHRONICITY: ICD-10-CM

## 2020-09-21 DIAGNOSIS — M25.562 PAIN IN BOTH KNEES, UNSPECIFIED CHRONICITY: Primary | ICD-10-CM

## 2020-09-21 PROCEDURE — 99999 PR PBB SHADOW E&M-EST. PATIENT-LVL III: CPT | Mod: PBBFAC,,, | Performed by: ORTHOPAEDIC SURGERY

## 2020-09-21 PROCEDURE — 73564 XR KNEE ORTHO BILAT WITH FLEXION: ICD-10-PCS | Mod: 26,50,, | Performed by: RADIOLOGY

## 2020-09-21 PROCEDURE — 99214 PR OFFICE/OUTPT VISIT, EST, LEVL IV, 30-39 MIN: ICD-10-PCS | Mod: S$GLB,,, | Performed by: ORTHOPAEDIC SURGERY

## 2020-09-21 PROCEDURE — 99214 OFFICE O/P EST MOD 30 MIN: CPT | Mod: S$GLB,,, | Performed by: ORTHOPAEDIC SURGERY

## 2020-09-21 PROCEDURE — 73564 X-RAY EXAM KNEE 4 OR MORE: CPT | Mod: 26,50,, | Performed by: RADIOLOGY

## 2020-09-21 PROCEDURE — 99999 PR PBB SHADOW E&M-EST. PATIENT-LVL III: ICD-10-PCS | Mod: PBBFAC,,, | Performed by: ORTHOPAEDIC SURGERY

## 2020-09-21 PROCEDURE — 73564 X-RAY EXAM KNEE 4 OR MORE: CPT | Mod: TC,50

## 2020-09-21 NOTE — PROGRESS NOTES
Seen Dr. Sophie viera for knee and hip issues.   Previously played basketball at [employer] currently works at [employer].     CC:  Left knee pain    20 y.o. Female with a history of bilateral knee pain, has been having increased left knee anterior soreness just distal to patella.  (Previous surgery on proximal patella).  Note pain is isolated to inferior tendon area near tubercle and worse with prolonged hyperflexion.  Denies paresthesias or weakness.     - mechanical symptoms, - instability    Is affecting ADLs.       DATE OF PROCEDURE: 2/5/2015  PROCEDURE PERFORMED:   left  1. Left knee proximal patellar tendon open repair    2. knee arthroscopic partial synovectomy/debridement.    3. knee arthroscopic plica excision.    4. knee open patellar tendon repair  5. knee arthroscopic partial medial meniscectomy      REVIEW OF SYSTEMS:  Constitution: Negative. Negative for chills, fever and night sweats.   HENT: Negative for congestion and headaches.    Eyes: Negative for blurred vision, left vision loss and right vision loss.   Cardiovascular: Negative for chest pain and syncope.   Respiratory: Negative for cough and shortness of breath.    Endocrine: Negative for polydipsia, polyphagia and polyuria.   Hematologic/Lymphatic: Negative for bleeding problem. Does not bruise/bleed easily.   Skin: Negative for dry skin, itching and rash.   Musculoskeletal: Negative for falls. Positive for left knee pain and  muscle weakness.   Gastrointestinal: Negative for abdominal pain and bowel incontinence.   Genitourinary: Negative for bladder incontinence and nocturia.   Neurological: Negative for disturbances in coordination, loss of balance and seizures.   Psychiatric/Behavioral: Negative for depression. The patient does not have insomnia.    Allergic/Immunologic: Negative for hives and persistent infections.     PAST MEDICAL HISTORY:    Past Medical History:   Diagnosis Date    Migraines        PAST SURGICAL HISTORY:   Past  Surgical History:   Procedure Laterality Date    ANKLE SURGERY      left ankle    BALLOON DILATION OF URETER Left 5/7/2020    Procedure: DILATION, URETER, USING BALLOON;  Surgeon: Anoop Aguilar MD;  Location: [facility];  Service: Urology;  Laterality: Left;    CYSTOURETEROSCOPY WITH RETROGRADE PYELOGRAPHY AND INSERTION OF STENT INTO URETER Left 5/7/2020    Procedure: CYSTOURETEROSCOPY, WITH RETROGRADE PYELOGRAM AND URETERAL STENT INSERTION;  Surgeon: Anoop Aguilar MD;  Location: [facility];  Service: Urology;  Laterality: Left;    ESOPHAGOGASTRODUODENOSCOPY N/A 11/25/2019    Procedure: EGD (ESOPHAGOGASTRODUODENOSCOPY);  Surgeon: Chino Whelan MD;  Location: [facility];  Service: Colon and Rectal;  Laterality: N/A;    KNEE ARTHROSCOPY Left 2/5/2015    Dr. Barrios     URETEROSCOPIC REMOVAL OF URETERIC CALCULUS Left 5/7/2020    Procedure: REMOVAL, CALCULUS, URETER, URETEROSCOPIC;  Surgeon: Anoop Aguilar MD;  Location: [facility];  Service: Urology;  Laterality: Left;       FAMILY HISTORY:   Family History   Problem Relation Age of Onset    Hypertension Mother     Heart failure Mother     Arthritis Mother     No Known Problems Sister        SOCIAL HISTORY:   Social History     Socioeconomic History    Marital status: Single     Spouse name: Not on file    Number of children: Not on file    Years of education: Not on file    Highest education level: Not on file   Occupational History    Not on file   Social Needs    Financial resource strain: Not hard at all    Food insecurity     Worry: Never true     Inability: Never true    Transportation needs     Medical: No     Non-medical: No   Tobacco Use    Smoking status: Never Smoker    Smokeless tobacco: Never Used   Substance and Sexual Activity    Alcohol use: No     Frequency: Never     Drinks per session: Patient refused     Binge frequency: Never    Drug use: No    Sexual activity: Yes     Partners: Male     Birth control/protection: None  "  Lifestyle    Physical activity     Days per week: 3 days     Minutes per session: 30 min    Stress: Not at all   Relationships    Social connections     Talks on phone: More than three times a week     Gets together: Never     Attends Yazidism service: Not on file     Active member of club or organization: No     Attends meetings of clubs or organizations: Never     Relationship status: Never    Other Topics Concern    Not on file   Social History Narrative    Not on file       MEDICATIONS:     Current Outpatient Medications:     acetaminophen (TYLENOL) 325 MG tablet, Take 1 tablet (325 mg total) by mouth every 6 (six) hours as needed for Pain. (Patient not taking: Reported on 9/21/2020), Disp: , Rfl:     cetirizine (ZYRTEC) 10 MG tablet, TAKE 1 TABLET(S) EVERY DAY BY ORAL ROUTE FOR 30 DAYS., Disp: , Rfl: 3    clonazePAM (KLONOPIN) 0.5 MG tablet, Take 1 tablet (0.5 mg total) by mouth 2 (two) times daily as needed for Anxiety. (Patient not taking: Reported on 9/21/2020), Disp: 60 tablet, Rfl: 3    fluticasone (FLONASE) 50 mcg/actuation nasal spray, SPRAY 1 SPRAY(S) EVERY DAY BY INTRANASAL ROUTE FOR 30 DAYS., Disp: , Rfl: 3    medroxyPROGESTERone (DEPO-PROVERA) 150 mg/mL Syrg, Inject 150 mg into the muscle every 3 (three) months., Disp: , Rfl:     ondansetron (ZOFRAN-ODT) 4 MG TbDL, Take 1 tablet (4 mg total) by mouth every 6 (six) hours as needed. (Patient not taking: Reported on 9/21/2020), Disp: 30 tablet, Rfl: 2    predniSONE (DELTASONE) 20 MG tablet, 2 tabs po qd x 7 days (Patient not taking: Reported on 9/21/2020), Disp: 14 tablet, Rfl: 0    tamsulosin (FLOMAX) 0.4 mg Cap, Take 1 capsule (0.4 mg total) by mouth every evening. (Patient not taking: Reported on 8/12/2020), Disp: 30 capsule, Rfl: 0    URO--10-40.8-36 mg Cap, , Disp: , Rfl:     ALLERGIES:   No Known Allergies    VITAL SIGNS:   /84   Pulse 86   Ht 5' 1" (1.549 m)   Wt 61.2 kg (135 lb)   BMI 25.51 kg/m²  "     PHYSICAL EXAMINATION  General:  The patient is alert and oriented x 3.  Mood is pleasant.  Observation of ears, eyes and nose reveal no gross abnormalities.  No labored breathing observed.    LEFT KNEE EXAMINATION     OBSERVATION / INSPECTION   Gait:   Nonantalgic   Alignment:  Neutral   Scars:   None   Muscle atrophy: None  Effusion:  None   Warmth:  None   Discoloration:   none     TENDERNESS / CREPITUS (T / C):          T / C      T / C   Patella   + left inferior pole   Lateral joint line   - / -   Peripatellar medial  -  Medial joint line    - / -   Peripatellar lateral -  Medial plica   - / -   Patellar tendon +   distal Left  Popliteal fossa   - / -   Quad tendon   -   Gastrocnemius   -   Prepatellar Bursa - / -   Quadricep   -   Tibial tubercle  -  Thigh/hamstring  -   Pes anserine/HS -  Fibula    -   ITB   - / -  Tibia     -   Tib/fib joint  - / -  LCL    -     MFC   - / -   MCL: Proximal  -    LFC   - / -    Distal   -          ROM: (* = pain)  PASSIVE   ACTIVE    Left :   5 / 0 / 145   5 / 0 / 145     Right :    10 / 0 / 140   10 / 0 / 140    Patellofemoral examination:  See above noted areas of tenderness.     Patella position    Subluxation / dislocation: Centered           Sup. / Inf;   Normal   Crepitus (PF):    Absent   Patellar Mobility:       Medial-lateral:   Normal    Superior-inferior:  Normal    Inferior tilt   Normal    Patellar tendon:  Normal   Lateral tilt:    Normal   J-sign:     None   Patellofemoral grind:   No pain       MENISCAL SIGNS:     Pain on terminal extension:  -  Pain on terminal flexion:  -  Tylers maneuver:  -   Squat     NT    LIGAMENT EXAMINATION:  ACL / Lachman:  normal (-1 to 2mm)    PCL-Post.  drawer: normal 0 to 2mm  MCL- Valgus:  normal 0 to 2mm  LCL- Varus:  normal 0 to 2mm  Pivot shift: normal (Equal)   Dial Test: difference c/w other side   At 30° flexion: normal (< 5°)    At 90° flexion: normal (< 5°)   Reverse Pivot Shift:   normal (Equal)      STRENGTH: (* = with pain) PAINFUL SIDE   Quadricep   5/5   Hamstrin/5    EXTREMITY NEURO-VASCULAR EXAMINATION:   Sensation:  Grossly intact to light touch all dermatomal regions.   Motor Function:  Fully intact motor function at hip, knee, foot and ankle    DTRs;  quadriceps and  achilles 2+.  No clonus and downgoing Babinski.    Vascular status:  DP and PT pulses 2+, brisk capillary refill, symmetric.     IMAGING:     X-rays including standing, weight bearing AP and flexion bilateral knees, lateral and merchant views ordered and images reviewed by me show:    No fracture or dislocation.  No bone destruction identified.  Evidence of previous patella surgery.     Other findings:    Bridge: positive  Step down : positive      ASSESSMENT:      Left Knee patella tendonitis distal  Anterior knee pain due to overload as a result of hip/core weakness       PLAN:     1. PT with Lanie KLINE For core / knee   2. Follow up in as needed  3. All questions were answered, pt will contact us for questions or concerns in the interim.

## 2020-09-25 NOTE — PROGRESS NOTES
Please see the below listed initial evaluation and POC. Thank you for your consult.    Gilma Brady, PT, DPT

## 2020-09-28 ENCOUNTER — CLINICAL SUPPORT (OUTPATIENT)
Dept: REHABILITATION | Facility: HOSPITAL | Age: 20
End: 2020-09-28
Payer: OTHER GOVERNMENT

## 2020-09-28 DIAGNOSIS — M62.81 QUADRICEPS WEAKNESS: Primary | ICD-10-CM

## 2020-09-28 DIAGNOSIS — M25.662 STIFFNESS OF LEFT KNEE: ICD-10-CM

## 2020-09-28 DIAGNOSIS — M25.561 PAIN IN BOTH KNEES, UNSPECIFIED CHRONICITY: ICD-10-CM

## 2020-09-28 DIAGNOSIS — M25.562 PAIN IN BOTH KNEES, UNSPECIFIED CHRONICITY: ICD-10-CM

## 2020-09-28 DIAGNOSIS — Z78.9 DIFFICULTY NAVIGATING STAIRS: ICD-10-CM

## 2020-09-28 DIAGNOSIS — R29.898 WEAKNESS OF BOTH HIPS: ICD-10-CM

## 2020-09-28 PROCEDURE — 97110 THERAPEUTIC EXERCISES: CPT

## 2020-09-28 PROCEDURE — 97161 PT EVAL LOW COMPLEX 20 MIN: CPT

## 2020-09-28 NOTE — PLAN OF CARE
[facility]  Physical Therapy Initial Evaluation    Name: Marilyn Salazar  Clinic Number: [id number]    Therapy Diagnosis:   Encounter Diagnoses   Name Primary?    Pain in both knees, unspecified chronicity     Quadriceps weakness Yes    Weakness of both hips     Stiffness of left knee     Difficulty navigating stairs      Physician: Shira Lundberg MD    Physician Orders: PT Eval and Treat  Medical Diagnosis from Referral: M25.561,M25.562 (ICD-10-CM) - Pain in both knees, unspecified chronicity   Evaluation Date: 9/28/2020  Authorization Period Expiration: pending  Plan of Care Expiration: 1/25/2021  Visit # / Visits authorized: 1/pending    Time In: 1408 (pt late arrival)  Time Out: 1502  Total Billable Time: 54 minutes    Precautions: Standard    Subjective   Date of onset: past couple of years  History of current condition - Marilyn reports ROGER knee has started to bother her again over the past couple of years. Notable worsening during spring with initiation of jogging. Pt previously did not exercise for about a year and a half after stopping basketball but started running last spring during jogging class at [facility]. Notes hx of L knee patellar tendon repair and menisectomy in 2015 w/ full recovery to 100% working here at [employer]. 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 No aspirations for sport activity at this point but wants to be able to tolerate normal functional mobility and work activity.     Medical History:   Past Medical History:   Diagnosis Date    Migraines        Surgical History:   Marilyn Salazar  has a past surgical history that includes Ankle surgery; Knee arthroscopy (Left, 2/5/2015); Esophagogastroduodenoscopy (N/A, 11/25/2019); Ureteroscopic removal of ureteric calculus (Left, 5/7/2020); Cystoureteroscopy with retrograde pyelography and insertion of stent into ureter (Left, 5/7/2020); and Balloon dilation of ureter (Left,  5/7/2020).    Medications:   Marilyn has a current medication list which includes the following prescription(s): acetaminophen, cetirizine, clonazepam, fluticasone propionate, medroxyprogesterone, ondansetron, prednisone, tamsulosin, and uro-mp.    Allergies:   Review of patient's allergies indicates:   Allergen Reactions    Azithromycin         Imaging, x-ray unremarkable    Prior Therapy: following patellar tendon repair in 2015 here at [place]  Social History[facility] [place] [facility]  Occupation: Works at [employer]; no issues w/ prolonged WB, some soreness with squatting/kneeling  Prior Level of Function: indep w/ all ADLs, work, and recreational exercise  Current Level of Function: decreased work tolerance, unable to exercise    Pain:  Current 0/10, worst 8/10, best 0/10   Location: left knee   Description: Aching, Dull and Sharp  Aggravating Factors: squatting, lunging, prolonged flexion  Easing Factors: extension, movement    Pts goals: to return to unrestricted work and community ambulation w/o being limited by knee    Objective     Observation: no acute distress, pleasant demeanor; small patellar size bilaterally    Posture: increased IR w/ medial patellar orientation in stance; hyperextension bilaterally    Joint Mobility: increased patellar mobility all directions bilaterally; otherwise WNL    Palpation: mild TTP inferior patellar pole over patellar tendon    Sensation: intact and WNL    Flexibility: decreased quadriceps length on L > R    Edema: none appreciated    Range of Motion:   Knee Left active Left Passive Right Active R passive   Flexion 140 140 145 145   Extension +5 +5 +5 +5     Lower Extremity Strength  Left LE  Right LE    Knee extension: 5/5 Knee extension: 5/5   Knee flexion: 4/5 Knee flexion: 4+/5   Hip extension:  4-/5 Hip extension: 4+/5   Hip ER 4+/5 Hip ER 4+/5   Hip IR 4+/5 Hip IR 4+/5   Hip abduction: 3-/5 Hip abduction: 3-/5   Ankle dorsiflexion: 5/5 Ankle dorsiflexion: 5/5   Ankle  "plantarflexion: 5/5 Ankle plantarflexion: 5/5     HHD knee ext at 60 deg:  R: 55.4, 50.6, 56.8 lbs (avg 3 trials 54.3)  L: 54, 45.2, 52.5 lbs (avg 3 trials 50.6)  LSI: 93%    Special Tests:   Left   Valgus Stress Test -   Varus Stress test -   Lachman's test -   Patellar Grind Test -   Forced flexion -     Step down test: + for Trendelenburg bilat    Function:  - SL squat L: decreased depth and stability relative to R; reduced pelvic control w/ dynamic IR at hip  - SL squat R: unremarkable  - BW Squat: decreased depth w/ dynamic valgus, inward toe orientation   - SL balance (L): >30" EC, mild trunk sway  - SL balance (R): >30" EC, min trunk sway    CMS Impairment/Limitation/Restriction for FOTO Knee Survey    Therapist reviewed FOTO scores for Marilyn Salazar on 9/28/2020.   FOTO documents entered into [employer] - see Media section.    Limitation Score: 64%  Category: Mobility       TREATMENT   Treatment Time In: 1445  Treatment Time Out: 1500  Total Treatment time separate from Evaluation: 15 minutes    Marilyn received therapeutic exercises to develop strength, endurance, ROM, flexibility and core stabilization for 15 minutes including:  - Wall sit 3x30"  - SL hip abduction 10x5" ea  - Prone quad stretch 3x30"    Home Exercises and Patient Education Provided    Education provided:   - Role of PT, PT POC, activity modification, role of tendon loading and hip strengthening    Written Home Exercises Provided: yes.  Exercises were reviewed and Marilyn was able to demonstrate them prior to the end of the session.  Marilyn demonstrated good  understanding of the education provided.     See EMR under Patient Instructions for exercises provided 9/28/2020.    Assessment   Marilyn is a 20 y.o. female referred to outpatient physical therapy with a medical diagnosis of bilateral knee pain. Presents today with notable deficits in L quadriceps strength as assessed w/ HHD, decreased quadriceps flexibility, reduced hip strength " bilaterally, altered functional movement patterns, and decreased to prolonged knee flexion consistent w/ potential patellar tendinopathy. Furthermore, pt's recent acute spike in exercise activity following a prolonged period of sedentary behavior lends credence to this pathology. Pt will benefit from skilled therapy moving forward to address tendon load tolerance in addition to deficits in hip and core strength manifesting in functional movements in order to improve symptom threshold to tolerate work at [employer].    Pt prognosis is Good.   Pt will benefit from skilled outpatient physical therapy to address the deficits listed above and in the chart below, provide pt/family education, and to maximize pt's level of independence.     Plan of care discussed with patient: Yes  Pt's spiritual, cultural and educational needs considered and patient is agreeable to the plan of care and goals as stated below:     Anticipated Barriers for therapy: Covid-19; work and school schedules    Medical Necessity is demonstrated by the following  History  Co-morbidities and personal factors that may impact the plan of care Co-morbidities:   none    Personal Factors:   lifestyle     low   Examination  Body Structures and Functions, activity limitations and participation restrictions that may impact the plan of care Body Regions:   lower extremities    Body Systems:    gross symmetry  ROM  strength  motor control    Participation Restrictions:   Running, jumping, prolonged sitting, squatting    Activity limitations:   Learning and applying knowledge  no deficits    General Tasks and Commands  no deficits    Communication  no deficits    Mobility  lifting and carrying objects  walking  driving (bike, car, motorcycle)    Self care  no deficits    Domestic Life  doing house work (cleaning house, washing dishes, laundry)    Interactions/Relationships  no deficits    Life Areas  no deficits    Community and Social Life  no deficits         low    Clinical Presentation stable and uncomplicated low   Decision Making/ Complexity Score: low     Goals:  Short Term Goals (4-6 Weeks):   1) Pt will demonstrate compliance with initial home exercise program as prescribed by physical therapist to improve independence with management of condition.  2) Pt to improve passive range of motion in L knee flexion to symmetrical with the RLE to allow for improved functional mobility with squatting at work.  3) Pt to report L knee pain of <5/10 at worst to improve tolerance to ADLs and work activities.  4) Pt will demonstrate improvement in hip abductor strength to at least 3+/5 bilaterally for improving stability and mechanics with weightbearing activities.    Long Term Goals (12 Weeks):   1) Pt to achieve <20% limitation as measured by the FOTO to demonstrate decreased disability.  2) Pt to report L knee pain of <3/10 at worst to improve tolerance to ADLs and work activities.  3) Pt will demonstrate improvement in hip abductor strength to at least 4+/5 bilaterally for improving stability and mechanics with weightbearing activities.    Plan   Plan of care Certification: 9/28/2020 to 1/25/2020.    Outpatient Physical Therapy 1 times weekly for 12 weeks to include the following interventions: Gait Training, Manual Therapy, Moist Heat/ Ice, Neuromuscular Re-ed, Patient Education, Self Care, Therapeutic Activites and Therapeutic Exercise.     Gilma Brady, PT

## 2020-10-02 ENCOUNTER — PATIENT MESSAGE (OUTPATIENT)
Dept: REHABILITATION | Facility: HOSPITAL | Age: 20
End: 2020-10-02

## 2020-10-06 ENCOUNTER — CLINICAL SUPPORT (OUTPATIENT)
Dept: REHABILITATION | Facility: HOSPITAL | Age: 20
End: 2020-10-06
Payer: OTHER GOVERNMENT

## 2020-10-06 DIAGNOSIS — Z78.9 DIFFICULTY NAVIGATING STAIRS: ICD-10-CM

## 2020-10-06 DIAGNOSIS — M62.81 QUADRICEPS WEAKNESS: ICD-10-CM

## 2020-10-06 DIAGNOSIS — M25.662 STIFFNESS OF LEFT KNEE: ICD-10-CM

## 2020-10-06 DIAGNOSIS — R29.898 WEAKNESS OF BOTH HIPS: ICD-10-CM

## 2020-10-06 PROCEDURE — 97112 NEUROMUSCULAR REEDUCATION: CPT

## 2020-10-06 PROCEDURE — 97110 THERAPEUTIC EXERCISES: CPT

## 2020-10-06 NOTE — PROGRESS NOTES
"Physical Therapy Daily Treatment Note     Name: Marilyn Salazar  Clinic Number: [id number]    Therapy Diagnosis: No diagnosis found.  Physician: Shira Lundberg MD    Visit Date: 10/6/2020  Physician Orders: PT Eval and Treat  Medical Diagnosis from Referral: M25.561,M25.562 (ICD-10-CM) - Pain in both knees, unspecified chronicity   Evaluation Date: 9/28/2020  Authorization Period Expiration: pending  Plan of Care Expiration: 1/25/2021  Visit # / Visits authorized: 2/pending    Time In: 0936  Time Out: 1035  Total Billable Time: 40 minutes    Precautions: Standard    Subjective     Pt reports: doing much better. One instance of knee popping w/ some pain, but overall w/ improved symptoms relative to eval.  She was compliant with home exercise program.  Response to previous treatment: improved symptoms, some mm soreness  Functional change: better tolerance to stairs    Pain: 0/10  Location: left knee      Objective     Marilyn received therapeutic exercises to develop strength, endurance, ROM, flexibility and core stabilization for 41 minutes including:  Upright bike x6' level 5 for L knee ROM  Prone quad stretch 3x30"  Lat band walk [id number] ea GTB VC for positioning  Seated knee ext machine 90-30 3x10 NW  SL hip abduction 2x10 ea    Marilyn received the following manual therapy techniques: Joint mobilizations were applied to the: L knee for 0 minutes, including:    Marilyn participated in neuromuscular re-education activities to improve: Balance, Coordination, Kinesthetic, Sense, Proprioception and Posture for 18 minutes. The following activities were included:  Tempo split squat 3x8 3" up/3" down  SL hip hinge 2x10 ea VC for pelvic control    Marilyn participated in dynamic functional therapeutic activities to improve functional performance for 0  minutes, including:    Marilyn participated in gait training to improve functional mobility and safety for 0 minutes, including:    Home Exercises Provided and Patient Education " Provided     Education provided:   - Role of PT, PT POC, activity modification, role of tendon loading and hip strengthening    Written Home Exercises Provided: Patient instructed to cont prior HEP.  Exercises were reviewed and Marilyn was able to demonstrate them prior to the end of the session.  Marilyn demonstrated good  understanding of the education provided.     See EMR under Patient Instructions for exercises provided prior visit.    Assessment     Progressing nicely. Improved symptoms relative to eval, able to initiate isotonic loading for quadriceps today. Utilized tempo to increase muscular demands w/ reduced external loading. Good response to loading w/ isolated knee ext in modified range. Continued hip abd strength deficits, improved SL control w/ cueing and repetition.    Marilyn is progressing well towards her goals.   Pt prognosis is Good.     Pt will continue to benefit from skilled outpatient physical therapy to address the deficits listed in the problem list box on initial evaluation, provide pt/family education and to maximize pt's level of independence in the home and community environment.     Pt's spiritual, cultural and educational needs considered and pt agreeable to plan of care and goals.    Anticipated barriers to physical therapy: Covid-19    Goals:   Short Term Goals (4-6 Weeks): (progressing, not met)  1) Pt will demonstrate compliance with initial home exercise program as prescribed by physical therapist to improve independence with management of condition.  2) Pt to improve passive range of motion in L knee flexion to symmetrical with the RLE to allow for improved functional mobility with squatting at work.  3) Pt to report L knee pain of <5/10 at worst to improve tolerance to ADLs and work activities.  4) Pt will demonstrate improvement in hip abductor strength to at least 3+/5 bilaterally for improving stability and mechanics with weightbearing activities.     Long Term Goals (12 Weeks):  (progressing, not met)  1) Pt to achieve <20% limitation as measured by the FOTO to demonstrate decreased disability.  2) Pt to report L knee pain of <3/10 at worst to improve tolerance to ADLs and work activities.  3) Pt will demonstrate improvement in hip abductor strength to at least 4+/5 bilaterally for improving stability and mechanics with weightbearing activities.    Plan     Continue w/ current POC emphasizing graded tendon loading, hip and core strengthening, and gradual restoration of previous activity level    Plan of care Certification: 9/28/2020 to 1/25/2020.  Outpatient Physical Therapy 1 times weekly for 12 weeks to include the following interventions: Gait Training, Manual Therapy, Moist Heat/ Ice, Neuromuscular Re-ed, Patient Education, Self Care, Therapeutic Activites and Therapeutic Exercise.     Gilma Brady, PT

## 2020-10-12 ENCOUNTER — CLINICAL SUPPORT (OUTPATIENT)
Dept: REHABILITATION | Facility: HOSPITAL | Age: 20
End: 2020-10-12
Payer: OTHER GOVERNMENT

## 2020-10-12 DIAGNOSIS — M25.662 STIFFNESS OF LEFT KNEE: ICD-10-CM

## 2020-10-12 DIAGNOSIS — R29.898 WEAKNESS OF BOTH HIPS: ICD-10-CM

## 2020-10-12 DIAGNOSIS — Z78.9 DIFFICULTY NAVIGATING STAIRS: ICD-10-CM

## 2020-10-12 DIAGNOSIS — M62.81 QUADRICEPS WEAKNESS: ICD-10-CM

## 2020-10-12 PROCEDURE — 97110 THERAPEUTIC EXERCISES: CPT

## 2020-10-12 PROCEDURE — 97112 NEUROMUSCULAR REEDUCATION: CPT

## 2020-10-12 NOTE — PROGRESS NOTES
"Physical Therapy Daily Treatment Note     Name: Marilyn Salazar  Clinic Number: [id number]    Therapy Diagnosis:   Encounter Diagnoses   Name Primary?    Quadriceps weakness     Weakness of both hips     Stiffness of left knee     Difficulty navigating stairs      Physician: Shira Lundberg MD    Visit Date: 10/12/2020  Physician Orders: PT Eval and Treat  Medical Diagnosis from Referral: M25.561,M25.562 (ICD-10-CM) - Pain in both knees, unspecified chronicity   Evaluation Date: 9/28/2020  Authorization Period Expiration: 12/31/2020  Plan of Care Expiration: 1/25/2021  Visit # / Visits authorized: 2/20 (3 total)    Time In: 1400  Time Out: 1450  Total Billable Time: 49 minutes    Precautions: Standard    Subjective     Pt reports: doing great. Had some quad and hip soreness after last session, but no knee pain.  She was compliant with home exercise program.  Response to previous treatment: improved symptoms, some mm soreness  Functional change: better tolerance to stairs    Pain: 0/10  Location: left knee      Objective     Marilyn received therapeutic exercises to develop strength, endurance, ROM, flexibility and core stabilization for 35 minutes including:  Upright bike x5' level 5 for L knee ROM  Prone quad stretch 3x30"  Lat band walk [id number] ea GTB VC for positioning  Seated knee ext machine full arc 3x10 5# (RIR = 2)  SL hip abduction 2x10 ea -NT    Marilyn received the following manual therapy techniques: Joint mobilizations were applied to the: L knee for 0 minutes, including:    Marilyn participated in neuromuscular re-education activities to improve: Balance, Coordination, Kinesthetic, Sense, Proprioception and Posture for 15 minutes. The following activities were included:  Split squat 2x10 ea +10-20# VAS 3-4/10  SL hip hinge 3x8 ea VC for pelvic control    Marilyn participated in dynamic functional therapeutic activities to improve functional performance for 0  minutes, including:    Marilyn participated in gait " training to improve functional mobility and safety for 0 minutes, including:    Home Exercises Provided and Patient Education Provided     Education provided:   - Role of PT, PT POC, activity modification, role of tendon loading and hip strengthening    Written Home Exercises Provided: Patient instructed to cont prior HEP.  Exercises were reviewed and Marilyn was able to demonstrate them prior to the end of the session.  Marilyn demonstrated good  understanding of the education provided.     See EMR under Patient Instructions for exercises provided prior visit.    Assessment     Progressing well. Excellent tolerance to knee extensor loading last week with no reproduction of familiar symptoms during, after, or next morning following workout. Some improvement in quadriceps mm stiffness noted today. Improved tolerance to isolated knee ext loading today, able to progress to full arc w/ additional load. Some pain w/ resisted split squat today, but improved with cueing for positioning and loading. Able to add external loading today. Emphasized increased frequency to 3x weekly w/ tendon loading program.    Marilyn is progressing well towards her goals.   Pt prognosis is Good.     Pt will continue to benefit from skilled outpatient physical therapy to address the deficits listed in the problem list box on initial evaluation, provide pt/family education and to maximize pt's level of independence in the home and community environment.     Pt's spiritual, cultural and educational needs considered and pt agreeable to plan of care and goals.    Anticipated barriers to physical therapy: Covid-19    Goals:   Short Term Goals (4-6 Weeks): (progressing, not met)  1) Pt will demonstrate compliance with initial home exercise program as prescribed by physical therapist to improve independence with management of condition.  2) Pt to improve passive range of motion in L knee flexion to symmetrical with the RLE to allow for improved functional  mobility with squatting at work.  3) Pt to report L knee pain of <5/10 at worst to improve tolerance to ADLs and work activities.  4) Pt will demonstrate improvement in hip abductor strength to at least 3+/5 bilaterally for improving stability and mechanics with weightbearing activities.     Long Term Goals (12 Weeks): (progressing, not met)  1) Pt to achieve <20% limitation as measured by the FOTO to demonstrate decreased disability.  2) Pt to report L knee pain of <3/10 at worst to improve tolerance to ADLs and work activities.  3) Pt will demonstrate improvement in hip abductor strength to at least 4+/5 bilaterally for improving stability and mechanics with weightbearing activities.    Plan     Continue w/ current POC emphasizing graded tendon loading, hip and core strengthening, and gradual restoration of previous activity level    Plan of care Certification: 9/28/2020 to 1/25/2020.  Outpatient Physical Therapy 1 times weekly for 12 weeks to include the following interventions: Gait Training, Manual Therapy, Moist Heat/ Ice, Neuromuscular Re-ed, Patient Education, Self Care, Therapeutic Activites and Therapeutic Exercise.     Gilma Brady, PT     Co-treated with Melody Miles, SPT

## 2020-10-19 ENCOUNTER — CLINICAL SUPPORT (OUTPATIENT)
Dept: REHABILITATION | Facility: HOSPITAL | Age: 20
End: 2020-10-19
Payer: OTHER GOVERNMENT

## 2020-10-19 DIAGNOSIS — M62.81 QUADRICEPS WEAKNESS: ICD-10-CM

## 2020-10-19 DIAGNOSIS — R29.898 WEAKNESS OF BOTH HIPS: ICD-10-CM

## 2020-10-19 DIAGNOSIS — M25.662 STIFFNESS OF LEFT KNEE: ICD-10-CM

## 2020-10-19 DIAGNOSIS — Z78.9 DIFFICULTY NAVIGATING STAIRS: ICD-10-CM

## 2020-10-19 PROCEDURE — 97112 NEUROMUSCULAR REEDUCATION: CPT

## 2020-10-19 PROCEDURE — 97110 THERAPEUTIC EXERCISES: CPT

## 2020-10-19 NOTE — PROGRESS NOTES
"Physical Therapy Daily Treatment Note     Name: Marilyn Salazar  Clinic Number: [id number]    Therapy Diagnosis:   Encounter Diagnoses   Name Primary?    Quadriceps weakness     Weakness of both hips     Stiffness of left knee     Difficulty navigating stairs      Physician: Shira Lundberg MD    Visit Date: 10/19/2020  Physician Orders: PT Eval and Treat  Medical Diagnosis from Referral: M25.561,M25.562 (ICD-10-CM) - Pain in both knees, unspecified chronicity   Evaluation Date: 9/28/2020  Authorization Period Expiration: 12/31/2020  Plan of Care Expiration: 1/25/2021  Visit # / Visits authorized: 3/20 (3 total)    Time In: 1400  Time Out: 1450  Total Billable Time: 50 minutes    Precautions: Standard    Subjective     Pt reports: Pt reports that after last session her knee was sore and painful for about three days and then came back down to normal with no pain. Increased pain when squatting and stairs. At its highest pain increased to 6/10 and then returned back to baseline around 1-2/10.  She was compliant with home exercise program.  Response to previous treatment: improved symptoms, some mm soreness  Functional change: better tolerance to stairs    Pain: 0/10  Location: left knee      Objective     Marilyn received therapeutic exercises to develop strength, endurance, ROM, flexibility and core stabilization for 25 minutes including:  Upright bike x5' level 5 for L knee ROM  Prone quad stretch 3x30"  Lat band walk w/ basketball toss [id number] ea BTB VC for positioning  Seated knee ext machine full arc 3x10 5-7.5# (RIR = 2)  SL hip abduction 2x10 ea -NT    Marilyn received the following manual therapy techniques: Joint mobilizations were applied to the: L knee for 0 minutes, including:    Marilyn participated in neuromuscular re-education activities to improve: Balance, Coordination, Kinesthetic, Sense, Proprioception and Posture for 25 minutes. The following activities were included:  Split squat 2x10 ea +10# VAS " 0/10  Lateral lunge 2x10 ea VC for ROM modification  SL hip hinge 3x8 ea VC for pelvic control  Supine bridge w/ alt march 2x10 ea VC for level pelvis    Marilyn participated in dynamic functional therapeutic activities to improve functional performance for 0 minutes, including:    Marilyn participated in gait training to improve functional mobility and safety for 0 minutes, including:    Home Exercises Provided and Patient Education Provided     Education provided:   - Role of PT, PT POC, activity modification, role of tendon loading and hip strengthening    Written Home Exercises Provided: Patient instructed to cont prior HEP.  Exercises were reviewed and Marilyn was able to demonstrate them prior to the end of the session.  Marilyn demonstrated good  understanding of the education provided.     See EMR under Patient Instructions for exercises provided prior visit.    Assessment     Decreased some load with therapy today with increased pain after last session but able to return back to baseline in couple of days. Improved quad flexibility noted today with quad stretch and improved tolerance to exercises today. No pain with exercises in L knee and able to increase load with knee extensions today. Progressing well with therapy and educated to continue exercises at home outside of therapy with a frequency of at least 3x a week to help with progression with therapy.     Marilyn is progressing well towards her goals.   Pt prognosis is Good.     Pt will continue to benefit from skilled outpatient physical therapy to address the deficits listed in the problem list box on initial evaluation, provide pt/family education and to maximize pt's level of independence in the home and community environment.     Pt's spiritual, cultural and educational needs considered and pt agreeable to plan of care and goals.    Anticipated barriers to physical therapy: Covid-19    Goals:   Short Term Goals (4-6 Weeks): (progressing, not met)  1) Pt will  demonstrate compliance with initial home exercise program as prescribed by physical therapist to improve independence with management of condition.  2) Pt to improve passive range of motion in L knee flexion to symmetrical with the RLE to allow for improved functional mobility with squatting at work.  3) Pt to report L knee pain of <5/10 at worst to improve tolerance to ADLs and work activities.  4) Pt will demonstrate improvement in hip abductor strength to at least 3+/5 bilaterally for improving stability and mechanics with weightbearing activities.     Long Term Goals (12 Weeks): (progressing, not met)  1) Pt to achieve <20% limitation as measured by the FOTO to demonstrate decreased disability.  2) Pt to report L knee pain of <3/10 at worst to improve tolerance to ADLs and work activities.  3) Pt will demonstrate improvement in hip abductor strength to at least 4+/5 bilaterally for improving stability and mechanics with weightbearing activities.    Plan     Continue w/ current POC emphasizing graded tendon loading, hip and core strengthening, and gradual restoration of previous activity level    Plan of care Certification: 9/28/2020 to 1/25/2020.  Outpatient Physical Therapy 1 times weekly for 12 weeks to include the following interventions: Gait Training, Manual Therapy, Moist Heat/ Ice, Neuromuscular Re-ed, Patient Education, Self Care, Therapeutic Activites and Therapeutic Exercise.     Gilma Brady PT     Co-treated with and documentation by Melody Miles, SPT

## 2020-10-26 ENCOUNTER — CLINICAL SUPPORT (OUTPATIENT)
Dept: REHABILITATION | Facility: HOSPITAL | Age: 20
End: 2020-10-26
Payer: OTHER GOVERNMENT

## 2020-10-26 DIAGNOSIS — Z78.9 DIFFICULTY NAVIGATING STAIRS: ICD-10-CM

## 2020-10-26 DIAGNOSIS — R29.898 WEAKNESS OF BOTH HIPS: ICD-10-CM

## 2020-10-26 DIAGNOSIS — M25.662 STIFFNESS OF LEFT KNEE: ICD-10-CM

## 2020-10-26 DIAGNOSIS — M62.81 QUADRICEPS WEAKNESS: ICD-10-CM

## 2020-10-26 PROCEDURE — 97112 NEUROMUSCULAR REEDUCATION: CPT

## 2020-10-26 PROCEDURE — 97110 THERAPEUTIC EXERCISES: CPT

## 2020-10-26 NOTE — PROGRESS NOTES
"Physical Therapy Daily Treatment Note     Name: Marilyn Salazar  Clinic Number: [id number]    Therapy Diagnosis:   Encounter Diagnoses   Name Primary?    Quadriceps weakness     Weakness of both hips     Stiffness of left knee     Difficulty navigating stairs      Physician: Shira Lundberg MD    Visit Date: 10/26/2020  Physician Orders: PT Eval and Treat  Medical Diagnosis from Referral: M25.561,M25.562 (ICD-10-CM) - Pain in both knees, unspecified chronicity   Evaluation Date: 9/28/2020  Authorization Period Expiration: 12/31/2020  Plan of Care Expiration: 1/25/2021  Visit # / Visits authorized: 4/20    Time In: 1402  Time Out: 1450  Total Billable Time: 48 minutes    Precautions: Standard    Subjective     Pt reports: Was a little sore after last session and going up and down stairs made it a little more sore. Took a couple of days and then her knee came back down to normal and no problems today.  She was compliant with home exercise program.  Response to previous treatment: improved symptoms, some mm soreness  Functional change: better tolerance to stairs    Pain: 0/10  Location: left knee      Objective     Marilyn received therapeutic exercises to develop strength, endurance, ROM, flexibility and core stabilization for 32 minutes including:  Upright bike x5' level 5 for L knee ROM  Knee ext machine isometric holds at 60 5x45"  Shuttle SL press 3x12 (1 black strap, 1 red strap)  Seated knee ext machine full arc 3x10 5-7.5# (RIR = 2) - NT  SL hip abduction 2x10 ea     Marilyn received the following manual therapy techniques: Joint mobilizations were applied to the: L knee for 0 minutes, including:    Marilyn participated in neuromuscular re-education activities to improve: Balance, Coordination, Kinesthetic, Sense, Proprioception and Posture for 16 minutes. The following activities were included:  Tempo split squat 3/0/3/X 3x8  Lateral lunge 2x10 ea VC for ROM modification - NT  SL hip hinge 3x8 ea VC for pelvic " control - NT  Supine bridge w/ alt march 2x10 ea VC for level pelvis    Marilyn participated in dynamic functional therapeutic activities to improve functional performance for 0 minutes, including:    Marilyn participated in gait training to improve functional mobility and safety for 0 minutes, including:    Home Exercises Provided and Patient Education Provided     Education provided:   - Role of PT, PT POC, activity modification, role of tendon loading and hip strengthening    Written Home Exercises Provided: Patient instructed to cont prior HEP.  Exercises were reviewed and Marilyn was able to demonstrate them prior to the end of the session.  Marilyn demonstrated good  understanding of the education provided.     See EMR under Patient Instructions for exercises provided prior visit.    Assessment     Continues to progress fairly well. Tendon symptoms exacerbated mostly by mobility demands with stairs and squatting at work. Improving hip control and knee ext load tolerance.    Marilyn is progressing well towards her goals.   Pt prognosis is Good.     Pt will continue to benefit from skilled outpatient physical therapy to address the deficits listed in the problem list box on initial evaluation, provide pt/family education and to maximize pt's level of independence in the home and community environment.     Pt's spiritual, cultural and educational needs considered and pt agreeable to plan of care and goals.    Anticipated barriers to physical therapy: Covid-19    Goals:   Short Term Goals (4-6 Weeks): (progressing, not met)  1) Pt will demonstrate compliance with initial home exercise program as prescribed by physical therapist to improve independence with management of condition.  2) Pt to improve passive range of motion in L knee flexion to symmetrical with the RLE to allow for improved functional mobility with squatting at work.  3) Pt to report L knee pain of <5/10 at worst to improve tolerance to ADLs and work  activities.  4) Pt will demonstrate improvement in hip abductor strength to at least 3+/5 bilaterally for improving stability and mechanics with weightbearing activities.     Long Term Goals (12 Weeks): (progressing, not met)  1) Pt to achieve <20% limitation as measured by the FOTO to demonstrate decreased disability.  2) Pt to report L knee pain of <3/10 at worst to improve tolerance to ADLs and work activities.  3) Pt will demonstrate improvement in hip abductor strength to at least 4+/5 bilaterally for improving stability and mechanics with weightbearing activities.    Plan     Continue w/ current POC emphasizing graded tendon loading, hip and core strengthening, and gradual restoration of previous activity level    Plan of care Certification: 9/28/2020 to 1/25/2020.  Outpatient Physical Therapy 1 times weekly for 12 weeks to include the following interventions: Gait Training, Manual Therapy, Moist Heat/ Ice, Neuromuscular Re-ed, Patient Education, Self Care, Therapeutic Activites and Therapeutic Exercise.     Gilma Brady, PT     Co-treated with and documentation by Melody Miles, SPT

## 2020-11-02 ENCOUNTER — CLINICAL SUPPORT (OUTPATIENT)
Dept: REHABILITATION | Facility: HOSPITAL | Age: 20
End: 2020-11-02
Payer: OTHER GOVERNMENT

## 2020-11-02 DIAGNOSIS — M25.662 STIFFNESS OF LEFT KNEE: ICD-10-CM

## 2020-11-02 DIAGNOSIS — M62.81 QUADRICEPS WEAKNESS: ICD-10-CM

## 2020-11-02 DIAGNOSIS — R29.898 WEAKNESS OF BOTH HIPS: ICD-10-CM

## 2020-11-02 DIAGNOSIS — Z78.9 DIFFICULTY NAVIGATING STAIRS: ICD-10-CM

## 2020-11-02 PROCEDURE — 97112 NEUROMUSCULAR REEDUCATION: CPT

## 2020-11-02 PROCEDURE — 97110 THERAPEUTIC EXERCISES: CPT

## 2020-11-02 NOTE — PROGRESS NOTES
"Physical Therapy Daily Treatment Note     Name: Marilyn Salazar  Clinic Number: [id number]    Therapy Diagnosis:   Encounter Diagnoses   Name Primary?    Quadriceps weakness     Weakness of both hips     Stiffness of left knee     Difficulty navigating stairs      Physician: Shira Lundberg MD    Visit Date: 11/2/2020  Physician Orders: PT Eval and Treat  Medical Diagnosis from Referral: M25.561,M25.562 (ICD-10-CM) - Pain in both knees, unspecified chronicity   Evaluation Date: 9/28/2020  Authorization Period Expiration: 12/31/2020  Plan of Care Expiration: 1/25/2021  Visit # / Visits authorized: 5/20    Time In: 1321  Time Out: 1401  Total Billable Time: 40 minutes    Precautions: Standard    Subjective     Pt reports: doing great. No symptoms after last session, this week has been much better.  She was compliant with home exercise program.  Response to previous treatment: improved symptoms, some mm soreness  Functional change: better tolerance to stairs    Pain: 0/10  Location: left knee      Objective     Marilyn received therapeutic exercises to develop strength, endurance, ROM, flexibility and core stabilization for 24 minutes including:  Upright bike x5' level 5 for L knee ROM  Knee ext machine isometric holds at 60 5x45"  Shuttle SL press 2x10 2 straps  Shuttle sidelying SL press 2x10 1.5 straps    Marilyn received the following manual therapy techniques: Joint mobilizations were applied to the: L knee for 0 minutes, including:    Marilyn participated in neuromuscular re-education activities to improve: Balance, Coordination, Kinesthetic, Sense, Proprioception and Posture for 16 minutes. The following activities were included:  DL bridge feet elevated 12" 3x10  SL rotational RDL 2x10 ea  FFE tempo split squat 3/0/3/X 3x8 ea    Marilyn participated in dynamic functional therapeutic activities to improve functional performance for 0 minutes, including:    Marilyn participated in gait training to improve functional " mobility and safety for 0 minutes, including:    Home Exercises Provided and Patient Education Provided     Education provided:   - Role of PT, PT POC, activity modification, role of tendon loading and hip strengthening    Written Home Exercises Provided: Patient instructed to cont prior HEP.  Exercises were reviewed and Marilyn was able to demonstrate them prior to the end of the session.  Marilyn demonstrated good  understanding of the education provided.     See EMR under Patient Instructions for exercises provided prior visit.    Assessment     Responding well to daily isometric loading. Improved symptoms and tolerance to CKC loading today. Improving hip control w/ SL tasks.    Marilyn is progressing well towards her goals.   Pt prognosis is Good.     Pt will continue to benefit from skilled outpatient physical therapy to address the deficits listed in the problem list box on initial evaluation, provide pt/family education and to maximize pt's level of independence in the home and community environment.     Pt's spiritual, cultural and educational needs considered and pt agreeable to plan of care and goals.    Anticipated barriers to physical therapy: Covid-19    Goals:   Short Term Goals (4-6 Weeks): (met)  1) Pt will demonstrate compliance with initial home exercise program as prescribed by physical therapist to improve independence with management of condition.  2) Pt to improve passive range of motion in L knee flexion to symmetrical with the RLE to allow for improved functional mobility with squatting at work.  3) Pt to report L knee pain of <5/10 at worst to improve tolerance to ADLs and work activities.  4) Pt will demonstrate improvement in hip abductor strength to at least 3+/5 bilaterally for improving stability and mechanics with weightbearing activities.     Long Term Goals (12 Weeks): (progressing, not met)  1) Pt to achieve <20% limitation as measured by the FOTO to demonstrate decreased disability.  2)  Pt to report L knee pain of <3/10 at worst to improve tolerance to ADLs and work activities.  3) Pt will demonstrate improvement in hip abductor strength to at least 4+/5 bilaterally for improving stability and mechanics with weightbearing activities.    Plan     Continue w/ current POC emphasizing graded tendon loading, hip and core strengthening, and gradual restoration of previous activity level    Plan of care Certification: 9/28/2020 to 1/25/2020.  Outpatient Physical Therapy 1 times weekly for 12 weeks to include the following interventions: Gait Training, Manual Therapy, Moist Heat/ Ice, Neuromuscular Re-ed, Patient Education, Self Care, Therapeutic Activites and Therapeutic Exercise.     Gilma Brady, PT     Co-treated with Melody Miles, SPT

## 2020-11-09 ENCOUNTER — CLINICAL SUPPORT (OUTPATIENT)
Dept: REHABILITATION | Facility: HOSPITAL | Age: 20
End: 2020-11-09
Attending: INTERNAL MEDICINE
Payer: OTHER GOVERNMENT

## 2020-11-09 DIAGNOSIS — Z78.9 DIFFICULTY NAVIGATING STAIRS: ICD-10-CM

## 2020-11-09 DIAGNOSIS — M62.81 QUADRICEPS WEAKNESS: ICD-10-CM

## 2020-11-09 DIAGNOSIS — M25.662 STIFFNESS OF LEFT KNEE: ICD-10-CM

## 2020-11-09 DIAGNOSIS — R29.898 WEAKNESS OF BOTH HIPS: ICD-10-CM

## 2020-11-09 PROCEDURE — 97112 NEUROMUSCULAR REEDUCATION: CPT

## 2020-11-09 PROCEDURE — 97110 THERAPEUTIC EXERCISES: CPT

## 2020-11-09 NOTE — PROGRESS NOTES
Physical Therapy Daily Treatment Note     Name: Marilyn Salazar  Clinic Number: [id number]    Therapy Diagnosis:   Encounter Diagnoses   Name Primary?    Quadriceps weakness     Weakness of both hips     Stiffness of left knee     Difficulty navigating stairs      Physician: Shira Lundberg MD    Visit Date: 11/9/2020  Physician Orders: PT Eval and Treat  Medical Diagnosis from Referral: M25.561,M25.562 (ICD-10-CM) - Pain in both knees, unspecified chronicity   Evaluation Date: 9/28/2020  Authorization Period Expiration: 12/31/2020  Plan of Care Expiration: 1/25/2021  Visit # / Visits authorized: 6/20    Time In: 1400  Time Out: 1445  Total Billable Time: 45 minutes    Precautions: Standard    Subjective     Pt reports: Feeling really good, no complaints with her knee has no pain today. No pain this week.  She was compliant with home exercise program.  Response to previous treatment: improved symptoms, some mm soreness  Functional change: better tolerance to stairs    Pain: 0/10  Location: left knee      Objective     Marilyn received therapeutic exercises to develop strength, endurance, ROM, flexibility and core stabilization for 30 minutes including:  Upright bike x5' level 5 for L knee ROM  Glute thrusts 3x8 reps   Knee ext machine 3x10 w/ 10#  Elbows and toes plank 3x20 sec     Marilyn received the following manual therapy techniques: Joint mobilizations were applied to the: L knee for 0 minutes, including:    Marilyn participated in neuromuscular re-education activities to improve: Balance, Coordination, Kinesthetic, Sense, Proprioception and Posture for 15 minutes. The following activities were included:  SL rotational RDL 3x8 ea  FFE tempo split squat 3/0/3/X 3x8 ea with 10#   Deep squats with UE support walk downs 2x10 reps     Marilyn participated in dynamic functional therapeutic activities to improve functional performance for 0 minutes, including:    Marilyn participated in gait training to improve functional  mobility and safety for 0 minutes, including:    Home Exercises Provided and Patient Education Provided     Education provided:   - Role of PT, PT POC, activity modification, role of tendon loading and hip strengthening    Written Home Exercises Provided: Patient instructed to cont prior HEP.  Exercises were reviewed and Marilyn was able to demonstrate them prior to the end of the session.  Marilyn demonstrated good  understanding of the education provided.     See EMR under Patient Instructions for exercises provided prior visit.    Assessment     Pt improving with therapy reporting no knee symptoms today. Able to tolerate increased load today with exercises. Able to progress more exercises today with no issues during therapy. Pt educated to continue to work on exercises at home and start incorporating deep squat from today to tolerance.     Marilyn is progressing well towards her goals.   Pt prognosis is Good.     Pt will continue to benefit from skilled outpatient physical therapy to address the deficits listed in the problem list box on initial evaluation, provide pt/family education and to maximize pt's level of independence in the home and community environment.     Pt's spiritual, cultural and educational needs considered and pt agreeable to plan of care and goals.    Anticipated barriers to physical therapy: Covid-19    Goals:   Short Term Goals (4-6 Weeks): (met)  1) Pt will demonstrate compliance with initial home exercise program as prescribed by physical therapist to improve independence with management of condition.  2) Pt to improve passive range of motion in L knee flexion to symmetrical with the RLE to allow for improved functional mobility with squatting at work.  3) Pt to report L knee pain of <5/10 at worst to improve tolerance to ADLs and work activities.  4) Pt will demonstrate improvement in hip abductor strength to at least 3+/5 bilaterally for improving stability and mechanics with weightbearing  activities.     Long Term Goals (12 Weeks): (progressing, not met)  1) Pt to achieve <20% limitation as measured by the FOTO to demonstrate decreased disability.  2) Pt to report L knee pain of <3/10 at worst to improve tolerance to ADLs and work activities.  3) Pt will demonstrate improvement in hip abductor strength to at least 4+/5 bilaterally for improving stability and mechanics with weightbearing activities.    Plan     Continue w/ current POC emphasizing graded tendon loading, hip and core strengthening, and gradual restoration of previous activity level    Plan of care Certification: 9/28/2020 to 1/25/2020.  Outpatient Physical Therapy 1 times weekly for 12 weeks to include the following interventions: Gait Training, Manual Therapy, Moist Heat/ Ice, Neuromuscular Re-ed, Patient Education, Self Care, Therapeutic Activites and Therapeutic Exercise.     Gilma Brady, PT     Co-treated with and documentation by Melody Miles, SPT

## 2020-11-16 ENCOUNTER — CLINICAL SUPPORT (OUTPATIENT)
Dept: REHABILITATION | Facility: HOSPITAL | Age: 20
End: 2020-11-16
Payer: OTHER GOVERNMENT

## 2020-11-16 DIAGNOSIS — M62.81 QUADRICEPS WEAKNESS: ICD-10-CM

## 2020-11-16 DIAGNOSIS — R29.898 WEAKNESS OF BOTH HIPS: ICD-10-CM

## 2020-11-16 DIAGNOSIS — Z78.9 DIFFICULTY NAVIGATING STAIRS: ICD-10-CM

## 2020-11-16 DIAGNOSIS — M25.662 STIFFNESS OF LEFT KNEE: ICD-10-CM

## 2020-11-16 PROCEDURE — 97110 THERAPEUTIC EXERCISES: CPT

## 2020-11-16 PROCEDURE — 97112 NEUROMUSCULAR REEDUCATION: CPT

## 2020-11-16 NOTE — PROGRESS NOTES
"Physical Therapy Daily Treatment Note     Name: Marilyn Salazar  Clinic Number: [id number]    Therapy Diagnosis:   Encounter Diagnoses   Name Primary?    Quadriceps weakness     Weakness of both hips     Stiffness of left knee     Difficulty navigating stairs      Physician: Shira Lundberg MD    Visit Date: 11/16/2020  Physician Orders: PT Eval and Treat  Medical Diagnosis from Referral: M25.561,M25.562 (ICD-10-CM) - Pain in both knees, unspecified chronicity   Evaluation Date: 9/28/2020  Authorization Period Expiration: 12/31/2020  Plan of Care Expiration: 1/25/2021  Visit # / Visits authorized: 7/20    Time In: 1400  Time Out: 1450  Total Billable Time: 50 minutes    Precautions: Standard    Subjective     Pt reports: Doing really well, no problems with the knee the past week.   She was compliant with home exercise program.  Response to previous treatment: improved symptoms, some mm soreness  Functional change: better tolerance to stairs    Pain: 0/10  Location: left knee      Objective     Marilyn received therapeutic exercises to develop strength, endurance, ROM, flexibility and core stabilization for 35 minutes including:  Upright bike x5' level 5 for L knee ROM  Squat walkdown in rack x10  Eccentric leg press 2 up 1 down 3x10 100#  Glute thrusts 3x8 reps - NT  Knee ext machine 3x10 w/ 10# -NT  Elbows and toes plank 3x20 sec   Feet elevated bridges (DL) 3x10     Marilyn received the following manual therapy techniques: Joint mobilizations were applied to the: L knee for 0 minutes, including:    Marilyn participated in neuromuscular re-education activities to improve: Balance, Coordination, Kinesthetic, Sense, Proprioception and Posture for 15 minutes. The following activities were included:  Fwd step up + RSD with dowel on back for ext cueing 3x10 ea 10"  Lateral Lunges 3x8 w/ 15# KB and VC for foot position   SL rotational RDL 3x8 ea -NT  FFE tempo split squat 3/0/3/X 3x8 ea with 10# - NT    Marilyn participated in " dynamic functional therapeutic activities to improve functional performance for 0 minutes, including:    Marilyn participated in gait training to improve functional mobility and safety for 0 minutes, including:    Home Exercises Provided and Patient Education Provided     Education provided:   - Role of PT, PT POC, activity modification, role of tendon loading and hip strengthening    Written Home Exercises Provided: Patient instructed to cont prior HEP.  Exercises were reviewed and Marilyn was able to demonstrate them prior to the end of the session.  Marilyn demonstrated good  understanding of the education provided.     See EMR under Patient Instructions for exercises provided prior visit.    Assessment     Pt progressing with therapy with no complaints of knee pain over the past week. Able to progress exercises today with increased knee flexion. Able to begin loading exercises with no complaints. Some contralateral hip drop with step ups noted but cleared up with external cueing with dowel. Pt educated to continue to work on HEP at home and educated to start working on loading knee at the gym with lower body exercises 2x a week to help with maintaining tendon loading progress.     Marilyn is progressing well towards her goals.   Pt prognosis is Good.     Pt will continue to benefit from skilled outpatient physical therapy to address the deficits listed in the problem list box on initial evaluation, provide pt/family education and to maximize pt's level of independence in the home and community environment.     Pt's spiritual, cultural and educational needs considered and pt agreeable to plan of care and goals.    Anticipated barriers to physical therapy: Covid-19    Goals:   Short Term Goals (4-6 Weeks): (met)  1) Pt will demonstrate compliance with initial home exercise program as prescribed by physical therapist to improve independence with management of condition.  2) Pt to improve passive range of motion in L knee  flexion to symmetrical with the RLE to allow for improved functional mobility with squatting at work.  3) Pt to report L knee pain of <5/10 at worst to improve tolerance to ADLs and work activities.  4) Pt will demonstrate improvement in hip abductor strength to at least 3+/5 bilaterally for improving stability and mechanics with weightbearing activities.     Long Term Goals (12 Weeks): (progressing, not met)  1) Pt to achieve <20% limitation as measured by the FOTO to demonstrate decreased disability.  2) Pt to report L knee pain of <3/10 at worst to improve tolerance to ADLs and work activities.  3) Pt will demonstrate improvement in hip abductor strength to at least 4+/5 bilaterally for improving stability and mechanics with weightbearing activities.    Plan     Continue w/ current POC emphasizing graded tendon loading, hip and core strengthening, and gradual restoration of previous activity level    Plan of care Certification: 9/28/2020 to 1/25/2020.  Outpatient Physical Therapy 1 times weekly for 12 weeks to include the following interventions: Gait Training, Manual Therapy, Moist Heat/ Ice, Neuromuscular Re-ed, Patient Education, Self Care, Therapeutic Activites and Therapeutic Exercise.     Gilma Brady, PT     Co-treated with and documentation by Melody Miles, SPT

## 2020-11-23 ENCOUNTER — CLINICAL SUPPORT (OUTPATIENT)
Dept: REHABILITATION | Facility: HOSPITAL | Age: 20
End: 2020-11-23
Payer: OTHER GOVERNMENT

## 2020-11-23 DIAGNOSIS — R29.898 WEAKNESS OF BOTH HIPS: ICD-10-CM

## 2020-11-23 DIAGNOSIS — M62.81 QUADRICEPS WEAKNESS: ICD-10-CM

## 2020-11-23 DIAGNOSIS — Z78.9 DIFFICULTY NAVIGATING STAIRS: ICD-10-CM

## 2020-11-23 DIAGNOSIS — M25.662 STIFFNESS OF LEFT KNEE: ICD-10-CM

## 2020-11-23 PROCEDURE — 97112 NEUROMUSCULAR REEDUCATION: CPT

## 2020-11-23 PROCEDURE — 97110 THERAPEUTIC EXERCISES: CPT

## 2020-11-23 NOTE — PROGRESS NOTES
"Physical Therapy Daily Treatment Note     Name: Marilyn Salazar  Clinic Number: [id number]    Therapy Diagnosis:   Encounter Diagnoses   Name Primary?    Quadriceps weakness     Weakness of both hips     Stiffness of left knee     Difficulty navigating stairs      Physician: Shira Lundberg MD    Visit Date: 11/23/2020  Physician Orders: PT Eval and Treat  Medical Diagnosis from Referral: M25.561,M25.562 (ICD-10-CM) - Pain in both knees, unspecified chronicity   Evaluation Date: 9/28/2020  Authorization Period Expiration: 12/31/2020  Plan of Care Expiration: 1/25/2021  Visit # / Visits authorized: 8/20    Time In: 1502 (pt late arrival)   Time Out: 1541  Total Billable Time: 39 minutes    Precautions: Standard    Subjective     Pt reports: Feeling good, no issues with the knee the past week. Felt fine after last visit, no problems.   She was compliant with home exercise program.  Response to previous treatment: improved symptoms, some mm soreness  Functional change: better tolerance to stairs    Pain: 0/10  Location: left knee      Objective     Marilyn received therapeutic exercises to develop strength, endurance, ROM, flexibility and core stabilization for 15 minutes including:  Upright bike x5' level 5 for L knee ROM  Glute thrusts 3x10 reps 10#   Elbows and toes plank 3x20 sec     Marilyn received the following manual therapy techniques: Joint mobilizations were applied to the: L knee for 0 minutes, including:    Marilyn participated in neuromuscular re-education activities to improve: Balance, Coordination, Kinesthetic, Sense, Proprioception and Posture for 24 minutes. The following activities were included:  Fwd step up 2x10 ea 10" VC for no lumbar extension and foot position   Lateral Lunges 3x8 w/ 15# KB and VC for foot position   SL hip hinge with 10# KB pass through 2x8 ea  FFE tempo split squat 3/0/3/X 3x8 ea with 10# - NT    Marilyn participated in dynamic functional therapeutic activities to improve " functional performance for 0 minutes, including:    Marilyn participated in gait training to improve functional mobility and safety for 0 minutes, including:    Home Exercises Provided and Patient Education Provided     Education provided:   - Role of PT, PT POC, activity modification, role of tendon loading and hip strengthening    Written Home Exercises Provided: Patient instructed to cont prior HEP.  Exercises were reviewed and Marilyn was able to demonstrate them prior to the end of the session.  Marilyn demonstrated good  understanding of the education provided.     See EMR under Patient Instructions for exercises provided prior visit.    Assessment     Pt progressing well with therapy, continues to improve with no issues or knee pain past couple visits. Able to progress loading and exercises with no issues. Pt educated to continue with HEP and incorporate exercises from today to continue to progress.     Marilyn is progressing well towards her goals.   Pt prognosis is Good.     Pt will continue to benefit from skilled outpatient physical therapy to address the deficits listed in the problem list box on initial evaluation, provide pt/family education and to maximize pt's level of independence in the home and community environment.     Pt's spiritual, cultural and educational needs considered and pt agreeable to plan of care and goals.    Anticipated barriers to physical therapy: Covid-19    Goals:   Short Term Goals (4-6 Weeks): (met)  1) Pt will demonstrate compliance with initial home exercise program as prescribed by physical therapist to improve independence with management of condition.  2) Pt to improve passive range of motion in L knee flexion to symmetrical with the RLE to allow for improved functional mobility with squatting at work.  3) Pt to report L knee pain of <5/10 at worst to improve tolerance to ADLs and work activities.  4) Pt will demonstrate improvement in hip abductor strength to at least 3+/5  bilaterally for improving stability and mechanics with weightbearing activities.     Long Term Goals (12 Weeks): (progressing, not met)  1) Pt to achieve <20% limitation as measured by the FOTO to demonstrate decreased disability.  2) Pt to report L knee pain of <3/10 at worst to improve tolerance to ADLs and work activities.  3) Pt will demonstrate improvement in hip abductor strength to at least 4+/5 bilaterally for improving stability and mechanics with weightbearing activities.    Plan     Continue w/ current POC emphasizing graded tendon loading, hip and core strengthening, and gradual restoration of previous activity level    Plan of care Certification: 9/28/2020 to 1/25/2020.  Outpatient Physical Therapy 1 times weekly for 12 weeks to include the following interventions: Gait Training, Manual Therapy, Moist Heat/ Ice, Neuromuscular Re-ed, Patient Education, Self Care, Therapeutic Activites and Therapeutic Exercise.     Gilma Brady, PT     Co-treated with and documentation by Melody Miles, SPT

## 2020-11-24 ENCOUNTER — OFFICE VISIT (OUTPATIENT)
Dept: RHEUMATOLOGY | Facility: CLINIC | Age: 20
End: 2020-11-24
Payer: OTHER GOVERNMENT

## 2020-11-24 ENCOUNTER — LAB VISIT (OUTPATIENT)
Dept: LAB | Facility: HOSPITAL | Age: 20
End: 2020-11-24
Attending: INTERNAL MEDICINE
Payer: OTHER GOVERNMENT

## 2020-11-24 ENCOUNTER — TELEPHONE (OUTPATIENT)
Dept: RHEUMATOLOGY | Facility: CLINIC | Age: 20
End: 2020-11-24

## 2020-11-24 VITALS
HEART RATE: 76 BPM | WEIGHT: 146.69 LBS | BODY MASS INDEX: 27.7 KG/M2 | DIASTOLIC BLOOD PRESSURE: 76 MMHG | SYSTOLIC BLOOD PRESSURE: 106 MMHG | HEIGHT: 61 IN

## 2020-11-24 DIAGNOSIS — L50.9 FULL BODY HIVES: ICD-10-CM

## 2020-11-24 DIAGNOSIS — M02.30 REACTIVE ARTHRITIS: ICD-10-CM

## 2020-11-24 DIAGNOSIS — N20.0 NEPHROLITHIASIS: ICD-10-CM

## 2020-11-24 DIAGNOSIS — M25.562 ACUTE PAIN OF BOTH KNEES: ICD-10-CM

## 2020-11-24 DIAGNOSIS — K21.9 GASTROESOPHAGEAL REFLUX DISEASE WITHOUT ESOPHAGITIS: ICD-10-CM

## 2020-11-24 DIAGNOSIS — L70.0 ACNE VULGARIS: ICD-10-CM

## 2020-11-24 DIAGNOSIS — M25.561 ACUTE PAIN OF BOTH KNEES: ICD-10-CM

## 2020-11-24 DIAGNOSIS — M02.30 REACTIVE ARTHRITIS: Primary | ICD-10-CM

## 2020-11-24 DIAGNOSIS — N20.0 NEPHROLITHIASIS: Primary | ICD-10-CM

## 2020-11-24 LAB
ALBUMIN SERPL BCP-MCNC: 3.8 G/DL (ref 3.5–5.2)
ALP SERPL-CCNC: 74 U/L (ref 55–135)
ALT SERPL W/O P-5'-P-CCNC: 63 U/L (ref 10–44)
ANION GAP SERPL CALC-SCNC: 7 MMOL/L (ref 8–16)
AST SERPL-CCNC: 30 U/L (ref 10–40)
BASOPHILS # BLD AUTO: 0.03 K/UL (ref 0–0.2)
BASOPHILS NFR BLD: 0.4 % (ref 0–1.9)
BILIRUB SERPL-MCNC: 0.4 MG/DL (ref 0.1–1)
BUN SERPL-MCNC: 14 MG/DL (ref 6–20)
CALCIUM SERPL-MCNC: 9.2 MG/DL (ref 8.7–10.5)
CHLORIDE SERPL-SCNC: 109 MMOL/L (ref 95–110)
CO2 SERPL-SCNC: 25 MMOL/L (ref 23–29)
CREAT SERPL-MCNC: 0.9 MG/DL (ref 0.5–1.4)
CRP SERPL-MCNC: 0.3 MG/L (ref 0–8.2)
DIFFERENTIAL METHOD: ABNORMAL
EOSINOPHIL # BLD AUTO: 0 K/UL (ref 0–0.5)
EOSINOPHIL NFR BLD: 0 % (ref 0–8)
ERYTHROCYTE [DISTWIDTH] IN BLOOD BY AUTOMATED COUNT: 13.2 % (ref 11.5–14.5)
ERYTHROCYTE [SEDIMENTATION RATE] IN BLOOD BY WESTERGREN METHOD: 8 MM/HR (ref 0–20)
EST. GFR  (AFRICAN AMERICAN): >60 ML/MIN/1.73 M^2
EST. GFR  (NON AFRICAN AMERICAN): >60 ML/MIN/1.73 M^2
GLUCOSE SERPL-MCNC: 94 MG/DL (ref 70–110)
HCT VFR BLD AUTO: 41.8 % (ref 37–48.5)
HGB BLD-MCNC: 12.9 G/DL (ref 12–16)
IMM GRANULOCYTES # BLD AUTO: 0.02 K/UL (ref 0–0.04)
IMM GRANULOCYTES NFR BLD AUTO: 0.3 % (ref 0–0.5)
LYMPHOCYTES # BLD AUTO: 1.3 K/UL (ref 1–4.8)
LYMPHOCYTES NFR BLD: 17.3 % (ref 18–48)
MCH RBC QN AUTO: 28.3 PG (ref 27–31)
MCHC RBC AUTO-ENTMCNC: 30.9 G/DL (ref 32–36)
MCV RBC AUTO: 92 FL (ref 82–98)
MONOCYTES # BLD AUTO: 0.1 K/UL (ref 0.3–1)
MONOCYTES NFR BLD: 1.7 % (ref 4–15)
NEUTROPHILS # BLD AUTO: 6 K/UL (ref 1.8–7.7)
NEUTROPHILS NFR BLD: 80.3 % (ref 38–73)
NRBC BLD-RTO: 0 /100 WBC
PLATELET # BLD AUTO: 288 K/UL (ref 150–350)
PMV BLD AUTO: 11.9 FL (ref 9.2–12.9)
POTASSIUM SERPL-SCNC: 3.6 MMOL/L (ref 3.5–5.1)
PROT SERPL-MCNC: 7.5 G/DL (ref 6–8.4)
RBC # BLD AUTO: 4.56 M/UL (ref 4–5.4)
SODIUM SERPL-SCNC: 141 MMOL/L (ref 136–145)
T3FREE SERPL-MCNC: 2.5 PG/ML (ref 2.3–4.2)
T4 FREE SERPL-MCNC: 0.94 NG/DL (ref 0.71–1.51)
TSH SERPL DL<=0.005 MIU/L-ACNC: 0.53 UIU/ML (ref 0.4–4)
WBC # BLD AUTO: 7.47 K/UL (ref 3.9–12.7)

## 2020-11-24 PROCEDURE — 36415 COLL VENOUS BLD VENIPUNCTURE: CPT | Mod: PO

## 2020-11-24 PROCEDURE — 84439 ASSAY OF FREE THYROXINE: CPT

## 2020-11-24 PROCEDURE — 99999 PR PBB SHADOW E&M-EST. PATIENT-LVL III: CPT | Mod: PBBFAC,,, | Performed by: INTERNAL MEDICINE

## 2020-11-24 PROCEDURE — 86235 NUCLEAR ANTIGEN ANTIBODY: CPT | Mod: 59

## 2020-11-24 PROCEDURE — 86644 CMV ANTIBODY: CPT

## 2020-11-24 PROCEDURE — 99205 PR OFFICE/OUTPT VISIT, NEW, LEVL V, 60-74 MIN: ICD-10-PCS | Mod: 25,S$GLB,, | Performed by: INTERNAL MEDICINE

## 2020-11-24 PROCEDURE — 86140 C-REACTIVE PROTEIN: CPT

## 2020-11-24 PROCEDURE — 84481 FREE ASSAY (FT-3): CPT

## 2020-11-24 PROCEDURE — 99205 OFFICE O/P NEW HI 60 MIN: CPT | Mod: 25,S$GLB,, | Performed by: INTERNAL MEDICINE

## 2020-11-24 PROCEDURE — 86645 CMV ANTIBODY IGM: CPT

## 2020-11-24 PROCEDURE — 96372 THER/PROPH/DIAG INJ SC/IM: CPT | Mod: S$GLB,,, | Performed by: INTERNAL MEDICINE

## 2020-11-24 PROCEDURE — 86376 MICROSOMAL ANTIBODY EACH: CPT

## 2020-11-24 PROCEDURE — 86747 PARVOVIRUS ANTIBODY: CPT

## 2020-11-24 PROCEDURE — 84443 ASSAY THYROID STIM HORMONE: CPT

## 2020-11-24 PROCEDURE — 86787 VARICELLA-ZOSTER ANTIBODY: CPT

## 2020-11-24 PROCEDURE — 85025 COMPLETE CBC W/AUTO DIFF WBC: CPT

## 2020-11-24 PROCEDURE — 96372 PR INJECTION,THERAP/PROPH/DIAG2ST, IM OR SUBCUT: ICD-10-PCS | Mod: S$GLB,,, | Performed by: INTERNAL MEDICINE

## 2020-11-24 PROCEDURE — 86800 THYROGLOBULIN ANTIBODY: CPT

## 2020-11-24 PROCEDURE — 99999 PR PBB SHADOW E&M-EST. PATIENT-LVL III: ICD-10-PCS | Mod: PBBFAC,,, | Performed by: INTERNAL MEDICINE

## 2020-11-24 PROCEDURE — 86235 NUCLEAR ANTIGEN ANTIBODY: CPT

## 2020-11-24 PROCEDURE — 83516 IMMUNOASSAY NONANTIBODY: CPT

## 2020-11-24 PROCEDURE — 86225 DNA ANTIBODY NATIVE: CPT

## 2020-11-24 PROCEDURE — 85651 RBC SED RATE NONAUTOMATED: CPT | Mod: PO

## 2020-11-24 PROCEDURE — 86787 VARICELLA-ZOSTER ANTIBODY: CPT | Mod: 91

## 2020-11-24 PROCEDURE — 80053 COMPREHEN METABOLIC PANEL: CPT

## 2020-11-24 RX ORDER — KETOROLAC TROMETHAMINE 30 MG/ML
60 INJECTION, SOLUTION INTRAMUSCULAR; INTRAVENOUS
Status: COMPLETED | OUTPATIENT
Start: 2020-11-24 | End: 2020-11-24

## 2020-11-24 RX ORDER — METHYLPREDNISOLONE 4 MG/1
TABLET ORAL
Qty: 1 PACKAGE | Refills: 0 | Status: SHIPPED | OUTPATIENT
Start: 2020-11-24 | End: 2020-12-15

## 2020-11-24 RX ORDER — POTASSIUM CITRATE 10 MEQ/1
10 TABLET, EXTENDED RELEASE ORAL
Qty: 90 TABLET | Refills: 3 | Status: SHIPPED | OUTPATIENT
Start: 2020-11-24 | End: 2021-07-02

## 2020-11-24 RX ORDER — ADAPALENE 0.1 G/100G
CREAM TOPICAL NIGHTLY
Qty: 45 G | Refills: 2 | Status: SHIPPED | OUTPATIENT
Start: 2020-11-24 | End: 2020-12-24

## 2020-11-24 RX ORDER — IBUPROFEN AND FAMOTIDINE 800; 26.6 MG/1; MG/1
1 TABLET, COATED ORAL 3 TIMES DAILY
Qty: 90 TABLET | Refills: 5 | Status: SHIPPED | OUTPATIENT
Start: 2020-11-24 | End: 2020-11-24

## 2020-11-24 RX ORDER — DEXAMETHASONE SODIUM PHOSPHATE 4 MG/ML
4 INJECTION, SOLUTION INTRA-ARTICULAR; INTRALESIONAL; INTRAMUSCULAR; INTRAVENOUS; SOFT TISSUE
Status: COMPLETED | OUTPATIENT
Start: 2020-11-24 | End: 2020-11-24

## 2020-11-24 RX ORDER — IBUPROFEN 800 MG/1
800 TABLET ORAL 3 TIMES DAILY PRN
Qty: 90 TABLET | Refills: 3 | Status: SHIPPED | OUTPATIENT
Start: 2020-11-24 | End: 2021-10-04 | Stop reason: SDUPTHER

## 2020-11-24 RX ORDER — FAMOTIDINE 20 MG/1
20 TABLET, FILM COATED ORAL 2 TIMES DAILY
Qty: 60 TABLET | Refills: 3 | Status: SHIPPED | OUTPATIENT
Start: 2020-11-24 | End: 2021-01-14

## 2020-11-24 RX ORDER — METHYLPREDNISOLONE ACETATE 80 MG/ML
80 INJECTION, SUSPENSION INTRA-ARTICULAR; INTRALESIONAL; INTRAMUSCULAR; SOFT TISSUE
Status: COMPLETED | OUTPATIENT
Start: 2020-11-24 | End: 2020-11-24

## 2020-11-24 RX ADMIN — METHYLPREDNISOLONE ACETATE 80 MG: 80 INJECTION, SUSPENSION INTRA-ARTICULAR; INTRALESIONAL; INTRAMUSCULAR; SOFT TISSUE at 12:11

## 2020-11-24 RX ADMIN — KETOROLAC TROMETHAMINE 60 MG: 30 INJECTION, SOLUTION INTRAMUSCULAR; INTRAVENOUS at 12:11

## 2020-11-24 RX ADMIN — DEXAMETHASONE SODIUM PHOSPHATE 4 MG: 4 INJECTION, SOLUTION INTRA-ARTICULAR; INTRALESIONAL; INTRAMUSCULAR; INTRAVENOUS; SOFT TISSUE at 12:11

## 2020-11-24 ASSESSMENT — ROUTINE ASSESSMENT OF PATIENT INDEX DATA (RAPID3)
PAIN SCORE: 6
FATIGUE SCORE: 1.1
TOTAL RAPID3 SCORE: 3.44
PATIENT GLOBAL ASSESSMENT SCORE: 3
MDHAQ FUNCTION SCORE: 0.4
PSYCHOLOGICAL DISTRESS SCORE: 1.1
AM STIFFNESS SCORE: 1, YES

## 2020-11-24 NOTE — PROGRESS NOTES
Subjective:       Patient ID: Marilyn Salazar is a 20 y.o. female.    Chief Complaint: Disease Management, Swelling (hands), and Pain    Hpi: pt is a 21 yo female whith a h/o hand pain after lythroptsy, she start with am gelling L hand first then added right am gelling x 30 min, new onset rash 2 weeks ago itching on abd and rash on her face.  New onset rash . Tried claritim zyrtec steroid injection stopped itching on torso,  No , detergent change lysol.          Affected locations include the right wrist, left wrist, left knee and right knee. Associated symptoms include fatigue, pain at night and pain while resting. Pertinent negatives include no dysuria, fever or dry eyes.     Past treatments include NSAIDs. The treatment provided mild relief.     Review of Systems   Constitutional: Positive for fatigue. Negative for activity change, appetite change, chills, diaphoresis, fever and unexpected weight change.   HENT: Negative for congestion, dental problem, ear discharge, ear pain, facial swelling, mouth sores, nosebleeds, postnasal drip, rhinorrhea, sinus pressure, sneezing, sore throat, tinnitus and voice change.    Eyes: Negative for photophobia, pain, discharge, redness and itching.   Respiratory: Negative for apnea, cough, chest tightness, shortness of breath and wheezing.    Cardiovascular: Negative for chest pain, palpitations and leg swelling.   Gastrointestinal: Negative for abdominal distention, abdominal pain, constipation, diarrhea, nausea and vomiting.   Endocrine: Negative for cold intolerance, heat intolerance, polydipsia and polyuria.   Genitourinary: Negative for decreased urine volume, difficulty urinating, dysuria, flank pain, frequency, hematuria and urgency.   Musculoskeletal: Positive for arthralgias and neck stiffness. Negative for back pain, gait problem and neck pain.   Skin: Positive for rash. Negative for pallor and wound.   Allergic/Immunologic: Negative for immunocompromised state.  "  Neurological: Negative for dizziness, tremors, weakness and numbness.   Hematological: Negative for adenopathy. Does not bruise/bleed easily.   Psychiatric/Behavioral: Negative for sleep disturbance. The patient is not nervous/anxious.          Objective:   /76 (BP Location: Right arm, Patient Position: Sitting, BP Method: Medium (Automatic))   Pulse 76   Ht 5' 1" (1.549 m)   Wt 66.6 kg (146 lb 11.5 oz)   BMI 27.72 kg/m²      Physical Exam   Vitals reviewed.  Constitutional: She is oriented to person, place, and time and well-developed, well-nourished, and in no distress.   HENT:   Head: Normocephalic and atraumatic.   Mouth/Throat: Oropharynx is clear and moist.   Eyes: EOM are normal. Pupils are equal, round, and reactive to light.   Neck: Neck supple. No thyromegaly present.   Cardiovascular: Normal rate, regular rhythm and normal heart sounds.  Exam reveals no gallop and no friction rub.    No murmur heard.  Pulmonary/Chest: Breath sounds normal. She has no wheezes. She has no rales. She exhibits no tenderness.   Abdominal: There is no abdominal tenderness. There is no rebound and no guarding.       Right Side Rheumatological Exam     Examination finds the shoulder, elbow, wrist, knee, 1st PIP, 1st MCP, 2nd PIP, 2nd MCP, 3rd PIP, 3rd MCP, 4th PIP, 4th MCP, 5th PIP and 5th MCP normal.    Shoulder Exam   Tenderness Location: no tenderness    Range of Motion   Active abduction: abnormal   Adduction: abnormal  Sensation: normal    Knee Exam   Patellofemoral Crepitus: negative  Effusion: negative  Sensation: normal    Hip Exam   Tenderness Location: no tenderness  Sensation: normal    Elbow/Wrist Exam   Tenderness Location: no tenderness  Sensation: normal    Left Side Rheumatological Exam     Examination finds the shoulder, elbow, wrist, knee, 1st PIP, 1st MCP, 2nd PIP, 2nd MCP, 3rd PIP, 3rd MCP, 4th PIP, 4th MCP, 5th PIP and 5th MCP normal.    Shoulder Exam   Tenderness Location: no tenderness    Range " of Motion   Active abduction: abnormal   Sensation: normal    Knee Exam     Patellofemoral Crepitus: negative  Effusion: negative  Sensation: normal    Hip Exam   Tenderness Location: no tenderness  Sensation: normal    Elbow/Wrist Exam   Sensation: normal      Back/Neck Exam   General Inspection   Gait: normal         Lymphadenopathy:     She has no cervical adenopathy.   Neurological: She is alert and oriented to person, place, and time. Gait normal.   Skin: Rash noted. No erythema. No pallor.     Psychiatric: Mood and affect normal.   Musculoskeletal:      Comments: B wrist,           Results for orders placed or performed in visit on 08/12/20   C-Reactive Protein   Result Value Ref Range    CRP 0.08 0.00 - 1.00 mg/dL   Cyclic Citrullinated Peptide Antibody, IgG   Result Value Ref Range    CCP Antibodies <0.5 <5.0 U/mL   Hepatitis C Antibody   Result Value Ref Range    Hepatitis C Ab Negative Negative   Sedimentation rate   Result Value Ref Range    Sed Rate 19 0 - 20 mm/Hr   SHERRI Screen w/Reflex   Result Value Ref Range    SHERRI Screen Negative <1:80 Negative <1:80   Rheumatoid factor   Result Value Ref Range    Rheumatoid Factor <10.0 0.0 - 15.0 IU/mL        Assessment:       1. Nephrolithiasis    2. Gastroesophageal reflux disease without esophagitis    3. Reactive arthritis    4. Acute pain of both knees    5. Full body hives    6. Acne vulgaris            Plan:       Marilyn was seen today for disease management, swelling and pain.    Diagnoses and all orders for this visit:    Nephrolithiasis  -     Anti-Thyroglobulin Antibody; Future  -     CBC Auto Differential; Future  -     Comprehensive Metabolic Panel; Future  -     TSH; Future  -     Thyroid Peroxidase Antibody; Future  -     T4, Free; Future  -     T3, Free; Future  -     Sedimentation rate; Future  -     Cytomegalovirus (CMV) Ab, IgM; Future  -     C-Reactive Protein; Future  -     Varicella Zoster Antibody, IgG; Future  -     Varicella zoster antibody,  IgM; Future  -     Cytomegalovirus Antibody, IgG; Future  -     Parvovirus B19 Antibody, IgG and IgM; Future  -     potassium citrate (UROCIT-K) 10 mEq (1,080 mg) TbSR; Take 1 tablet (10 mEq total) by mouth 3 (three) times daily with meals.  -     ibuprofen-famotidine (DUEXIS) 800-26.6 mg Tab; Take 1 tablet by mouth 3 (three) times daily.  -     dexamethasone injection 4 mg  -     ketorolac injection 60 mg  -     methylPREDNISolone acetate injection 80 mg  -     methylPREDNISolone (MEDROL DOSEPACK) 4 mg tablet; use as directed    Gastroesophageal reflux disease without esophagitis  -     Anti-Thyroglobulin Antibody; Future  -     CBC Auto Differential; Future  -     Comprehensive Metabolic Panel; Future  -     TSH; Future  -     Thyroid Peroxidase Antibody; Future  -     T4, Free; Future  -     T3, Free; Future  -     Sedimentation rate; Future  -     Cytomegalovirus (CMV) Ab, IgM; Future  -     C-Reactive Protein; Future  -     Varicella Zoster Antibody, IgG; Future  -     Varicella zoster antibody, IgM; Future  -     Cytomegalovirus Antibody, IgG; Future  -     Parvovirus B19 Antibody, IgG and IgM; Future  -     potassium citrate (UROCIT-K) 10 mEq (1,080 mg) TbSR; Take 1 tablet (10 mEq total) by mouth 3 (three) times daily with meals.  -     ibuprofen-famotidine (DUEXIS) 800-26.6 mg Tab; Take 1 tablet by mouth 3 (three) times daily.  -     dexamethasone injection 4 mg  -     ketorolac injection 60 mg  -     methylPREDNISolone acetate injection 80 mg  -     methylPREDNISolone (MEDROL DOSEPACK) 4 mg tablet; use as directed    Reactive arthritis  -     Anti-Thyroglobulin Antibody; Future  -     CBC Auto Differential; Future  -     Comprehensive Metabolic Panel; Future  -     TSH; Future  -     Thyroid Peroxidase Antibody; Future  -     T4, Free; Future  -     T3, Free; Future  -     Sedimentation rate; Future  -     Cytomegalovirus (CMV) Ab, IgM; Future  -     C-Reactive Protein; Future  -     Varicella Zoster  Antibody, IgG; Future  -     Varicella zoster antibody, IgM; Future  -     Cytomegalovirus Antibody, IgG; Future  -     Parvovirus B19 Antibody, IgG and IgM; Future  -     potassium citrate (UROCIT-K) 10 mEq (1,080 mg) TbSR; Take 1 tablet (10 mEq total) by mouth 3 (three) times daily with meals.  -     ibuprofen-famotidine (DUEXIS) 800-26.6 mg Tab; Take 1 tablet by mouth 3 (three) times daily.  -     dexamethasone injection 4 mg  -     ketorolac injection 60 mg  -     methylPREDNISolone acetate injection 80 mg  -     methylPREDNISolone (MEDROL DOSEPACK) 4 mg tablet; use as directed    Acute pain of both knees  -     Anti-Thyroglobulin Antibody; Future  -     CBC Auto Differential; Future  -     Comprehensive Metabolic Panel; Future  -     TSH; Future  -     Thyroid Peroxidase Antibody; Future  -     T4, Free; Future  -     T3, Free; Future  -     Sedimentation rate; Future  -     Cytomegalovirus (CMV) Ab, IgM; Future  -     C-Reactive Protein; Future  -     Varicella Zoster Antibody, IgG; Future  -     Varicella zoster antibody, IgM; Future  -     Cytomegalovirus Antibody, IgG; Future  -     Parvovirus B19 Antibody, IgG and IgM; Future  -     potassium citrate (UROCIT-K) 10 mEq (1,080 mg) TbSR; Take 1 tablet (10 mEq total) by mouth 3 (three) times daily with meals.  -     ibuprofen-famotidine (DUEXIS) 800-26.6 mg Tab; Take 1 tablet by mouth 3 (three) times daily.  -     dexamethasone injection 4 mg  -     ketorolac injection 60 mg  -     methylPREDNISolone acetate injection 80 mg  -     methylPREDNISolone (MEDROL DOSEPACK) 4 mg tablet; use as directed    Full body hives  -     methylPREDNISolone (MEDROL DOSEPACK) 4 mg tablet; use as directed    Acne vulgaris  -     adapalene (DIFFERIN) 0.1 % cream; Apply topically nightly.     pt likely has a reactive arthritis from  Infection  DD thyroid dz,  Allergic reaction from detergent.

## 2020-11-25 DIAGNOSIS — F41.9 ANXIETY: ICD-10-CM

## 2020-11-25 LAB
ANTI SM ANTIBODY: 0.07 RATIO (ref 0–0.99)
ANTI SM/RNP ANTIBODY: 0.06 RATIO (ref 0–0.99)
ANTI-SM INTERPRETATION: NEGATIVE
ANTI-SM/RNP INTERPRETATION: NEGATIVE
ANTI-SSA ANTIBODY: 0.04 RATIO (ref 0–0.99)
ANTI-SSA INTERPRETATION: NEGATIVE
ANTI-SSB ANTIBODY: 0.07 RATIO (ref 0–0.99)
ANTI-SSB INTERPRETATION: NEGATIVE
DSDNA AB SER-ACNC: NORMAL [IU]/ML
THYROGLOB AB SERPL IA-ACNC: <4 IU/ML (ref 0–3.9)
THYROPEROXIDASE IGG SERPL-ACNC: <6 IU/ML

## 2020-11-26 LAB — CMV IGM SERPL IA-ACNC: <8 AU/ML

## 2020-11-27 LAB
CMV IGG SERPL QL IA: REACTIVE
ENA SCL70 AB SER-ACNC: 2 UNITS
HISTONE IGG SER IA-ACNC: 0.2 UNITS (ref 0–0.9)
PARVOVIRUS B19 ABS IGG & IGM: ABNORMAL
PARVOVIRUS B19 IGG ANTIBODY: POSITIVE
PARVOVIRUS B19 IGM ANTIBODY: NEGATIVE
VARICELLA INTERPRETATION: POSITIVE
VARICELLA ZOSTER IGG: 1.59 ISR (ref 0–0.9)

## 2020-11-30 ENCOUNTER — CLINICAL SUPPORT (OUTPATIENT)
Dept: REHABILITATION | Facility: HOSPITAL | Age: 20
End: 2020-11-30
Payer: OTHER GOVERNMENT

## 2020-11-30 DIAGNOSIS — M25.662 STIFFNESS OF LEFT KNEE: ICD-10-CM

## 2020-11-30 DIAGNOSIS — M62.81 QUADRICEPS WEAKNESS: ICD-10-CM

## 2020-11-30 DIAGNOSIS — Z78.9 DIFFICULTY NAVIGATING STAIRS: ICD-10-CM

## 2020-11-30 DIAGNOSIS — R29.898 WEAKNESS OF BOTH HIPS: ICD-10-CM

## 2020-11-30 PROCEDURE — 97110 THERAPEUTIC EXERCISES: CPT

## 2020-11-30 PROCEDURE — 97112 NEUROMUSCULAR REEDUCATION: CPT

## 2020-11-30 RX ORDER — CLONAZEPAM 0.5 MG/1
0.5 TABLET ORAL 2 TIMES DAILY PRN
Qty: 60 TABLET | Refills: 4 | Status: SHIPPED | OUTPATIENT
Start: 2020-11-30 | End: 2024-01-12 | Stop reason: ALTCHOICE

## 2020-11-30 NOTE — PROGRESS NOTES
Physical Therapy Daily Treatment Note     Name: Marilyn Salazar  Clinic Number: [id number]    Therapy Diagnosis:   Encounter Diagnoses   Name Primary?    Quadriceps weakness     Weakness of both hips     Stiffness of left knee     Difficulty navigating stairs      Physician: Shira Lundberg MD    Visit Date: 11/30/2020  Physician Orders: PT Eval and Treat  Medical Diagnosis from Referral: M25.561,M25.562 (ICD-10-CM) - Pain in both knees, unspecified chronicity   Evaluation Date: 9/28/2020  Authorization Period Expiration: 12/31/2020  Plan of Care Expiration: 1/25/2021  Visit # / Visits authorized: 9/20    Time In: 1405  Time Out: 1455  Total Billable Time: 50 minutes    Precautions: Standard    Subjective     Pt reports: Doing really well. No issues with knee since last visit. Has been feeling really good.   She was compliant with home exercise program.  Response to previous treatment: improved symptoms, some mm soreness  Functional change: better tolerance to stairs    Pain: 0/10  Location: left knee      Objective     Marilyn received therapeutic exercises to develop strength, endurance, ROM, flexibility and core stabilization for 15 minutes including:  Upright bike x5' level 5 for L knee ROM  SL squats w/ TRX 3x8 ea      Marilyn received the following manual therapy techniques: Joint mobilizations were applied to the: L knee for 0 minutes, including:    Marilyn participated in neuromuscular re-education activities to improve: Balance, Coordination, Kinesthetic, Sense, Proprioception and Posture for 35 minutes. The following activities were included:  Walking lunges 3x30 yds   Duck walks 1x15 yds   Deep squats walking down squat rack 1x10 5 sec hold  SL hip hinge with 10# KB pass through 2x8 ea  SL squat 1 min     Marilyn participated in dynamic functional therapeutic activities to improve functional performance for 0 minutes, including:    Marilyn participated in gait training to improve functional mobility and safety  for 0 minutes, including:    Home Exercises Provided and Patient Education Provided     Education provided:   - Role of PT, PT POC, activity modification, role of tendon loading and hip strengthening    Written Home Exercises Provided: Patient instructed to cont prior HEP.  Exercises were reviewed and Marilyn was able to demonstrate them prior to the end of the session.  Marilyn demonstrated good  understanding of the education provided.     See EMR under Patient Instructions for exercises provided prior visit.    Assessment     Pt doing really well with therapy been constantly progressing past couple of visits with no issues or pain complaints. Pt able to complete deep squat holds and duck walks today with no issues with her knee. Pt able to tolerate increased volume of exercises today with no knee pain. Pt doing really well and ready to try HEP at for next couple weeks to see how she does. Pt educated to continue her exercises from therapy and given an updated HEP to continue to progress and work on her own at home.     Marilyn is progressing well towards her goals.   Pt prognosis is Good.     Pt will continue to benefit from skilled outpatient physical therapy to address the deficits listed in the problem list box on initial evaluation, provide pt/family education and to maximize pt's level of independence in the home and community environment.     Pt's spiritual, cultural and educational needs considered and pt agreeable to plan of care and goals.    Anticipated barriers to physical therapy: Covid-19    Goals:   Short Term Goals (4-6 Weeks): (met)  1) Pt will demonstrate compliance with initial home exercise program as prescribed by physical therapist to improve independence with management of condition.  2) Pt to improve passive range of motion in L knee flexion to symmetrical with the RLE to allow for improved functional mobility with squatting at work.  3) Pt to report L knee pain of <5/10 at worst to improve  tolerance to ADLs and work activities.  4) Pt will demonstrate improvement in hip abductor strength to at least 3+/5 bilaterally for improving stability and mechanics with weightbearing activities.     Long Term Goals (12 Weeks): (progressing, not met)  1) Pt to achieve <20% limitation as measured by the FOTO to demonstrate decreased disability.  2) Pt to report L knee pain of <3/10 at worst to improve tolerance to ADLs and work activities.  3) Pt will demonstrate improvement in hip abductor strength to at least 4+/5 bilaterally for improving stability and mechanics with weightbearing activities.    Plan     Continue w/ current POC emphasizing graded tendon loading, hip and core strengthening, and gradual restoration of previous activity level    Plan of care Certification: 9/28/2020 to 1/25/2020.  Outpatient Physical Therapy 1 times weekly for 12 weeks to include the following interventions: Gait Training, Manual Therapy, Moist Heat/ Ice, Neuromuscular Re-ed, Patient Education, Self Care, Therapeutic Activites and Therapeutic Exercise.     Gilma Brady, PT     Co-treated with and documentation by Melody Miles, SPT

## 2020-12-01 ENCOUNTER — PATIENT MESSAGE (OUTPATIENT)
Dept: UROLOGY | Facility: CLINIC | Age: 20
End: 2020-12-01

## 2020-12-02 ENCOUNTER — PATIENT MESSAGE (OUTPATIENT)
Dept: RHEUMATOLOGY | Facility: CLINIC | Age: 20
End: 2020-12-02

## 2020-12-02 ENCOUNTER — TELEPHONE (OUTPATIENT)
Dept: UROLOGY | Facility: CLINIC | Age: 20
End: 2020-12-02

## 2020-12-02 DIAGNOSIS — R23.4 PEELING SKIN: ICD-10-CM

## 2020-12-02 DIAGNOSIS — L29.9 ITCHING: ICD-10-CM

## 2020-12-02 DIAGNOSIS — R21 RASH: Primary | ICD-10-CM

## 2020-12-02 DIAGNOSIS — R20.8 BURNING SENSATION OF SKIN: ICD-10-CM

## 2020-12-02 LAB — VZV IGM SER IA-ACNC: 0.5

## 2020-12-02 NOTE — TELEPHONE ENCOUNTER
Called patient about scheduling her renal scan -- she stated she already called and got scheduled for [facility]

## 2020-12-02 NOTE — TELEPHONE ENCOUNTER
Patient will call back about what location and date she wants renal scan    She had to check her schedule

## 2020-12-02 NOTE — TELEPHONE ENCOUNTER
----- Message from Anoop Aguilar MD sent at 12/2/2020  9:04 AM CST -----  There is still persistent left hydronephrosis with normal appearing ureter and normal ureteral jets on ultrasound.  This was seen on the CT scan 6 months ago.  Evaluation needs to be performed with nuclear medicine renal scan with Lasix washout.  Skin is been ordered my staff will schedule.

## 2020-12-02 NOTE — TELEPHONE ENCOUNTER
----- Message from Piedad Nair sent at 12/2/2020 12:12 PM CST -----  Type:  Needs Medical Advice    Who Called: Self  Reason:returning call to schedule renal scan   Would the patient rather a call back or a response via [facility]? call  Best Call Back Number: [phone]  Additional Information: none

## 2020-12-07 ENCOUNTER — HOSPITAL ENCOUNTER (OUTPATIENT)
Dept: RADIOLOGY | Facility: HOSPITAL | Age: 20
Discharge: HOME | End: 2020-12-07
Attending: UROLOGY
Payer: OTHER GOVERNMENT

## 2020-12-07 ENCOUNTER — TELEPHONE (OUTPATIENT)
Dept: RHEUMATOLOGY | Facility: CLINIC | Age: 20
End: 2020-12-07

## 2020-12-07 DIAGNOSIS — N13.30 HYDRONEPHROSIS OF LEFT KIDNEY: ICD-10-CM

## 2020-12-07 PROCEDURE — A9539 TC99M PENTETATE: HCPCS

## 2020-12-07 PROCEDURE — 78708 NM KIDNEY W FLOW AND FUNCTION W PHARMACOLOGICAL: ICD-10-PCS | Mod: 26,,, | Performed by: RADIOLOGY

## 2020-12-07 PROCEDURE — 78708 K FLOW/FUNCT IMAGE W/DRUG: CPT | Mod: 26,,, | Performed by: RADIOLOGY

## 2020-12-08 ENCOUNTER — OFFICE VISIT (OUTPATIENT)
Dept: UROLOGY | Facility: CLINIC | Age: 20
End: 2020-12-08
Payer: OTHER GOVERNMENT

## 2020-12-08 VITALS
OXYGEN SATURATION: 99 % | DIASTOLIC BLOOD PRESSURE: 78 MMHG | BODY MASS INDEX: 27.56 KG/M2 | TEMPERATURE: 98 F | SYSTOLIC BLOOD PRESSURE: 116 MMHG | HEIGHT: 61 IN | WEIGHT: 146 LBS | HEART RATE: 98 BPM | RESPIRATION RATE: 19 BRPM

## 2020-12-08 DIAGNOSIS — N13.30 HYDRONEPHROSIS OF LEFT KIDNEY: Primary | ICD-10-CM

## 2020-12-08 DIAGNOSIS — N20.0 NEPHROLITHIASIS: Primary | ICD-10-CM

## 2020-12-08 DIAGNOSIS — N13.30 HYDRONEPHROSIS OF LEFT KIDNEY: ICD-10-CM

## 2020-12-08 PROCEDURE — 99999 PR PBB SHADOW E&M-EST. PATIENT-LVL IV: CPT | Mod: PBBFAC,,, | Performed by: UROLOGY

## 2020-12-08 PROCEDURE — 99212 PR OFFICE/OUTPT VISIT, EST, LEVL II, 10-19 MIN: ICD-10-PCS | Mod: S$GLB,,, | Performed by: UROLOGY

## 2020-12-08 PROCEDURE — 99212 OFFICE O/P EST SF 10 MIN: CPT | Mod: S$GLB,,, | Performed by: UROLOGY

## 2020-12-08 PROCEDURE — 99999 PR PBB SHADOW E&M-EST. PATIENT-LVL IV: ICD-10-PCS | Mod: PBBFAC,,, | Performed by: UROLOGY

## 2020-12-08 NOTE — PROGRESS NOTES
Subjective:       Patient ID: Marilyn Salazar is a 20 y.o. female.    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 Here for f/u post renal U/S and NM renal scan with lasix washout. Studies shoe normal function and no obstruction.    Other  This is a chronic (Hydronephrosis of left kidney (mild)) problem. The current episode started more than 1 year ago. The problem occurs constantly. The problem has been unchanged. Associated symptoms include a rash (Around neck). Pertinent negatives include no abdominal pain, anorexia, arthralgias, change in bowel habit, chest pain, chills, congestion, coughing, diaphoresis, fatigue, fever, headaches, joint swelling, myalgias, nausea, neck pain, numbness, sore throat, swollen glands, urinary symptoms, vertigo, visual change, vomiting or weakness. Nothing aggravates the symptoms. She has tried nothing for the symptoms.     Review of Systems   Constitutional: Negative.  Negative for chills, diaphoresis, fatigue and fever.   HENT: Negative.  Negative for congestion and sore throat.    Eyes: Negative.    Respiratory: Negative.  Negative for cough.    Cardiovascular: Negative.  Negative for chest pain.   Gastrointestinal: Negative.  Negative for abdominal pain, anorexia, change in bowel habit, nausea and vomiting.   Endocrine: Negative.    Genitourinary: Negative.    Musculoskeletal: Negative.  Negative for arthralgias, joint swelling, myalgias and neck pain.   Skin: Positive for rash (Around neck).   Allergic/Immunologic: Positive for environmental allergies ( possibly causing rash around neck).   Neurological: Negative.  Negative for vertigo, weakness, numbness and headaches.   Hematological: Negative.    Psychiatric/Behavioral: Negative.        Objective:      Physical Exam  Vitals signs and nursing note reviewed.   Constitutional:       Appearance: She is well-developed.   HENT:      Head: Normocephalic.      Nose: Nose normal.   Eyes:       Conjunctiva/sclera: Conjunctivae normal.      Pupils: Pupils are equal, round, and reactive to light.   Neck:      Musculoskeletal: Normal range of motion and neck supple.   Cardiovascular:      Rate and Rhythm: Normal rate and regular rhythm.      Heart sounds: Normal heart sounds.   Pulmonary:      Effort: Pulmonary effort is normal.      Breath sounds: Normal breath sounds.   Abdominal:      General: Bowel sounds are normal.      Palpations: Abdomen is soft.      Tenderness: There is no right CVA tenderness or left CVA tenderness.   Genitourinary:     Comments: Bladder nontender  Musculoskeletal: Normal range of motion.   Skin:     General: Skin is warm and dry.      Capillary Refill: Capillary refill takes less than 2 seconds.      Findings: Rash (Around neck) present.   Neurological:      Mental Status: She is alert and oriented to person, place, and time.      Deep Tendon Reflexes: Reflexes are normal and symmetric.   Psychiatric:         Behavior: Behavior normal.         Thought Content: Thought content normal.         Judgment: Judgment normal.         Assessment:       1. Hydronephrosis of left kidney        Plan:       Patient Instructions   Follow-up in 1 year with renal ultrasound and BMP

## 2020-12-08 NOTE — TELEPHONE ENCOUNTER
----- Message from Sid Lieberman MD sent at 11/27/2020 10:26 PM CST -----  Labs indicate past infection for shingles past infection for cytomegalovirus 1 of the liver tests are slightly elevated, lupus panel was negative.  Granulocytes are elevated like you have had a past infection.  At some milk thistle daily to help with liver elevation

## 2020-12-15 ENCOUNTER — DOCUMENTATION ONLY (OUTPATIENT)
Dept: REHABILITATION | Facility: HOSPITAL | Age: 20
End: 2020-12-15

## 2020-12-15 NOTE — PROGRESS NOTES
Outpatient Therapy Discharge Summary     Name: Marilyn Salazar  [facility] Number: [id number]    Therapy Diagnosis:        Encounter Diagnoses   Name Primary?    Quadriceps weakness      Weakness of both hips      Stiffness of left knee      Difficulty navigating stairs        Physician: Shira Lundberg MD     Visit Date: 11/30/2020  Physician Orders: PT Eval and Treat  Medical Diagnosis from Referral: M25.561,M25.562 (ICD-10-CM) - Pain in both knees, unspecified chronicity   Evaluation Date: 9/28/2020    Date of Last visit: 11/30/2020  Total Visits Received: 9  Cancelled Visits: -  No Show Visits: -    Assessment    Goals: met    Discharge reason: Patient has met all of his/her goals    Plan   This patient is discharged from Physical Therapy    Gilma Brady PT, DPT  12/15/2020

## 2021-01-14 ENCOUNTER — OFFICE VISIT (OUTPATIENT)
Dept: DERMATOLOGY | Facility: CLINIC | Age: 21
End: 2021-01-14
Payer: OTHER GOVERNMENT

## 2021-01-14 DIAGNOSIS — L40.8 SEBOPSORIASIS: ICD-10-CM

## 2021-01-14 PROCEDURE — 99204 OFFICE O/P NEW MOD 45 MIN: CPT | Mod: S$GLB,,, | Performed by: DERMATOLOGY

## 2021-01-14 PROCEDURE — 99999 PR PBB SHADOW E&M-EST. PATIENT-LVL II: CPT | Mod: PBBFAC,,, | Performed by: DERMATOLOGY

## 2021-01-14 PROCEDURE — 99999 PR PBB SHADOW E&M-EST. PATIENT-LVL II: ICD-10-PCS | Mod: PBBFAC,,, | Performed by: DERMATOLOGY

## 2021-01-14 PROCEDURE — 99204 PR OFFICE/OUTPT VISIT, NEW, LEVL IV, 45-59 MIN: ICD-10-PCS | Mod: S$GLB,,, | Performed by: DERMATOLOGY

## 2021-01-14 RX ORDER — FLUOCINONIDE TOPICAL SOLUTION USP, 0.05% 0.5 MG/ML
SOLUTION TOPICAL 2 TIMES DAILY
Qty: 60 ML | Refills: 2 | Status: SHIPPED | OUTPATIENT
Start: 2021-01-14 | End: 2021-06-07

## 2021-01-14 RX ORDER — KETOCONAZOLE 20 MG/ML
SHAMPOO, SUSPENSION TOPICAL
Qty: 120 ML | Refills: 5 | Status: SHIPPED | OUTPATIENT
Start: 2021-01-14 | End: 2024-01-12 | Stop reason: ALTCHOICE

## 2021-01-20 ENCOUNTER — OFFICE VISIT (OUTPATIENT)
Dept: RHEUMATOLOGY | Facility: CLINIC | Age: 21
End: 2021-01-20
Payer: OTHER GOVERNMENT

## 2021-01-20 ENCOUNTER — PATIENT MESSAGE (OUTPATIENT)
Dept: DERMATOLOGY | Facility: CLINIC | Age: 21
End: 2021-01-20

## 2021-01-20 VITALS — WEIGHT: 141 LBS | BODY MASS INDEX: 26.62 KG/M2 | HEIGHT: 61 IN

## 2021-01-20 DIAGNOSIS — M02.30 REACTIVE ARTHRITIS: Primary | ICD-10-CM

## 2021-01-20 DIAGNOSIS — L40.9 PSORIASIS OF SCALP: ICD-10-CM

## 2021-01-20 PROCEDURE — 99214 OFFICE O/P EST MOD 30 MIN: CPT | Mod: S$GLB,,, | Performed by: INTERNAL MEDICINE

## 2021-01-20 PROCEDURE — 99999 PR PBB SHADOW E&M-EST. PATIENT-LVL II: CPT | Mod: PBBFAC,,, | Performed by: INTERNAL MEDICINE

## 2021-01-20 PROCEDURE — 99999 PR PBB SHADOW E&M-EST. PATIENT-LVL II: ICD-10-PCS | Mod: PBBFAC,,, | Performed by: INTERNAL MEDICINE

## 2021-01-20 PROCEDURE — 99214 PR OFFICE/OUTPT VISIT, EST, LEVL IV, 30-39 MIN: ICD-10-PCS | Mod: S$GLB,,, | Performed by: INTERNAL MEDICINE

## 2021-01-20 RX ORDER — ADAPALENE 0.1 G/100G
CREAM TOPICAL NIGHTLY
Qty: 45 G | Refills: 3 | Status: SHIPPED | OUTPATIENT
Start: 2021-01-20 | End: 2021-07-02

## 2021-01-20 RX ORDER — CLOBETASOL PROPIONATE 0.5 MG/G
OINTMENT TOPICAL 2 TIMES DAILY
Qty: 45 G | Refills: 2 | Status: SHIPPED | OUTPATIENT
Start: 2021-01-20 | End: 2021-10-04

## 2021-01-20 ASSESSMENT — ROUTINE ASSESSMENT OF PATIENT INDEX DATA (RAPID3)
PATIENT GLOBAL ASSESSMENT SCORE: 0
PAIN SCORE: 0
FATIGUE SCORE: 0
MDHAQ FUNCTION SCORE: 0
PSYCHOLOGICAL DISTRESS SCORE: 0
TOTAL RAPID3 SCORE: 0

## 2021-01-27 ENCOUNTER — PATIENT MESSAGE (OUTPATIENT)
Dept: RHEUMATOLOGY | Facility: CLINIC | Age: 21
End: 2021-01-27

## 2021-02-11 ENCOUNTER — OFFICE VISIT (OUTPATIENT)
Dept: INTERNAL MEDICINE | Facility: CLINIC | Age: 21
End: 2021-02-11
Payer: OTHER GOVERNMENT

## 2021-02-11 DIAGNOSIS — Z00.00 ROUTINE GENERAL MEDICAL EXAMINATION AT A HEALTH CARE FACILITY: Primary | ICD-10-CM

## 2021-02-11 DIAGNOSIS — I34.1 MVP (MITRAL VALVE PROLAPSE): ICD-10-CM

## 2021-02-11 DIAGNOSIS — G43.109 MIGRAINE WITH AURA AND WITHOUT STATUS MIGRAINOSUS, NOT INTRACTABLE: ICD-10-CM

## 2021-02-11 PROCEDURE — 99999 PR PBB SHADOW E&M-EST. PATIENT-LVL II: CPT | Mod: PBBFAC,,, | Performed by: INTERNAL MEDICINE

## 2021-02-11 PROCEDURE — 99999 PR PBB SHADOW E&M-EST. PATIENT-LVL II: ICD-10-PCS | Mod: PBBFAC,,, | Performed by: INTERNAL MEDICINE

## 2021-02-11 PROCEDURE — 99395 PR PREVENTIVE VISIT,EST,18-39: ICD-10-PCS | Mod: S$GLB,,, | Performed by: INTERNAL MEDICINE

## 2021-02-11 PROCEDURE — 99395 PREV VISIT EST AGE 18-39: CPT | Mod: S$GLB,,, | Performed by: INTERNAL MEDICINE

## 2021-02-14 VITALS
DIASTOLIC BLOOD PRESSURE: 76 MMHG | BODY MASS INDEX: 26.62 KG/M2 | SYSTOLIC BLOOD PRESSURE: 120 MMHG | HEART RATE: 96 BPM | HEIGHT: 61 IN | WEIGHT: 141 LBS

## 2021-02-14 PROBLEM — G43.109 MIGRAINE WITH AURA AND WITHOUT STATUS MIGRAINOSUS, NOT INTRACTABLE: Status: ACTIVE | Noted: 2021-02-14

## 2021-03-06 ENCOUNTER — IMMUNIZATION (OUTPATIENT)
Dept: PRIMARY CARE CLINIC | Facility: CLINIC | Age: 21
End: 2021-03-06
Payer: OTHER GOVERNMENT

## 2021-03-06 DIAGNOSIS — Z23 NEED FOR VACCINATION: Primary | ICD-10-CM

## 2021-03-06 PROCEDURE — 0001A PR IMMUNIZ ADMIN, SARS-COV-2 COVID-19 VACC, 30MCG/0.3ML, 1ST DOSE: ICD-10-PCS | Mod: CV19,S$GLB,, | Performed by: INTERNAL MEDICINE

## 2021-03-06 PROCEDURE — 0001A PR IMMUNIZ ADMIN, SARS-COV-2 COVID-19 VACC, 30MCG/0.3ML, 1ST DOSE: CPT | Mod: CV19,S$GLB,, | Performed by: INTERNAL MEDICINE

## 2021-03-06 PROCEDURE — 91300 PR SARS-COV- 2 COVID-19 VACCINE, NO PRSV, 30MCG/0.3ML, IM: ICD-10-PCS | Mod: S$GLB,,, | Performed by: INTERNAL MEDICINE

## 2021-03-06 PROCEDURE — 91300 PR SARS-COV- 2 COVID-19 VACCINE, NO PRSV, 30MCG/0.3ML, IM: CPT | Mod: S$GLB,,, | Performed by: INTERNAL MEDICINE

## 2021-03-06 RX ADMIN — Medication 0.3 ML: at 09:03

## 2021-03-17 ENCOUNTER — OFFICE VISIT (OUTPATIENT)
Dept: DERMATOLOGY | Facility: CLINIC | Age: 21
End: 2021-03-17
Payer: OTHER GOVERNMENT

## 2021-03-17 VITALS — RESPIRATION RATE: 20 BRPM | HEIGHT: 61 IN | BODY MASS INDEX: 26.64 KG/M2

## 2021-03-17 DIAGNOSIS — L40.8 SEBOPSORIASIS: Primary | ICD-10-CM

## 2021-03-17 DIAGNOSIS — L70.0 ACNE VULGARIS: ICD-10-CM

## 2021-03-17 PROCEDURE — 99214 OFFICE O/P EST MOD 30 MIN: CPT | Mod: S$GLB,,, | Performed by: DERMATOLOGY

## 2021-03-17 PROCEDURE — 99999 PR PBB SHADOW E&M-EST. PATIENT-LVL III: ICD-10-PCS | Mod: PBBFAC,,, | Performed by: DERMATOLOGY

## 2021-03-17 PROCEDURE — 99214 PR OFFICE/OUTPT VISIT, EST, LEVL IV, 30-39 MIN: ICD-10-PCS | Mod: S$GLB,,, | Performed by: DERMATOLOGY

## 2021-03-17 PROCEDURE — 99999 PR PBB SHADOW E&M-EST. PATIENT-LVL III: CPT | Mod: PBBFAC,,, | Performed by: DERMATOLOGY

## 2021-03-17 RX ORDER — DOXYCYCLINE 100 MG/1
TABLET ORAL
Qty: 30 TABLET | Refills: 2 | Status: SHIPPED | OUTPATIENT
Start: 2021-03-17 | End: 2021-07-02

## 2021-03-17 RX ORDER — HYDROCORTISONE 25 MG/G
CREAM TOPICAL 2 TIMES DAILY
Qty: 20 G | Refills: 3 | Status: SHIPPED | OUTPATIENT
Start: 2021-03-17 | End: 2021-10-04

## 2021-03-17 RX ORDER — MECLIZINE HCL 12.5 MG 12.5 MG/1
12.5 TABLET ORAL EVERY 8 HOURS PRN
COMMUNITY
Start: 2021-02-16 | End: 2021-07-02

## 2021-03-17 RX ORDER — AZELAIC ACID 0.15 G/G
GEL TOPICAL DAILY
Qty: 50 G | Refills: 3 | Status: SHIPPED | OUTPATIENT
Start: 2021-03-17 | End: 2021-10-04

## 2021-03-27 ENCOUNTER — IMMUNIZATION (OUTPATIENT)
Dept: PRIMARY CARE CLINIC | Facility: CLINIC | Age: 21
End: 2021-03-27
Payer: OTHER GOVERNMENT

## 2021-03-27 DIAGNOSIS — Z23 NEED FOR VACCINATION: Primary | ICD-10-CM

## 2021-03-27 PROCEDURE — 0002A PR IMMUNIZ ADMIN, SARS-COV-2 COVID-19 VACC, 30MCG/0.3ML, 2ND DOSE: CPT | Mod: CV19,S$GLB,, | Performed by: INTERNAL MEDICINE

## 2021-03-27 PROCEDURE — 91300 PR SARS-COV- 2 COVID-19 VACCINE, NO PRSV, 30MCG/0.3ML, IM: CPT | Mod: S$GLB,,, | Performed by: INTERNAL MEDICINE

## 2021-03-27 PROCEDURE — 0002A PR IMMUNIZ ADMIN, SARS-COV-2 COVID-19 VACC, 30MCG/0.3ML, 2ND DOSE: ICD-10-PCS | Mod: CV19,S$GLB,, | Performed by: INTERNAL MEDICINE

## 2021-03-27 PROCEDURE — 91300 PR SARS-COV- 2 COVID-19 VACCINE, NO PRSV, 30MCG/0.3ML, IM: ICD-10-PCS | Mod: S$GLB,,, | Performed by: INTERNAL MEDICINE

## 2021-03-27 RX ADMIN — Medication 0.3 ML: at 09:03

## 2021-05-20 ENCOUNTER — TELEPHONE (OUTPATIENT)
Dept: OPTOMETRY | Facility: CLINIC | Age: 21
End: 2021-05-20

## 2021-05-25 ENCOUNTER — PATIENT MESSAGE (OUTPATIENT)
Dept: DERMATOLOGY | Facility: CLINIC | Age: 21
End: 2021-05-25

## 2021-05-28 ENCOUNTER — OFFICE VISIT (OUTPATIENT)
Dept: OPHTHALMOLOGY | Facility: CLINIC | Age: 21
End: 2021-05-28
Payer: COMMERCIAL

## 2021-05-28 DIAGNOSIS — H52.13 MYOPIA OF BOTH EYES: Primary | ICD-10-CM

## 2021-05-28 PROCEDURE — 92015 PR REFRACTION: ICD-10-PCS | Mod: S$GLB,,, | Performed by: OPTOMETRIST

## 2021-05-28 PROCEDURE — 92004 COMPRE OPH EXAM NEW PT 1/>: CPT | Mod: S$GLB,,, | Performed by: OPTOMETRIST

## 2021-05-28 PROCEDURE — 99999 PR PBB SHADOW E&M-EST. PATIENT-LVL III: ICD-10-PCS | Mod: PBBFAC,,, | Performed by: OPTOMETRIST

## 2021-05-28 PROCEDURE — 99999 PR PBB SHADOW E&M-EST. PATIENT-LVL III: CPT | Mod: PBBFAC,,, | Performed by: OPTOMETRIST

## 2021-05-28 PROCEDURE — 92310 PR CONTACT LENS FITTING (NO CHANGE): ICD-10-PCS | Mod: S$GLB,,, | Performed by: OPTOMETRIST

## 2021-05-28 PROCEDURE — 92004 PR EYE EXAM, NEW PATIENT,COMPREHESV: ICD-10-PCS | Mod: S$GLB,,, | Performed by: OPTOMETRIST

## 2021-05-28 PROCEDURE — 92310 CONTACT LENS FITTING OU: CPT | Mod: S$GLB,,, | Performed by: OPTOMETRIST

## 2021-05-28 PROCEDURE — 92015 DETERMINE REFRACTIVE STATE: CPT | Mod: S$GLB,,, | Performed by: OPTOMETRIST

## 2021-06-02 DIAGNOSIS — L40.8 SEBOPSORIASIS: ICD-10-CM

## 2021-06-07 RX ORDER — FLUOCINONIDE TOPICAL SOLUTION USP, 0.05% 0.5 MG/ML
SOLUTION TOPICAL 2 TIMES DAILY
Qty: 60 ML | Refills: 2 | Status: SHIPPED | OUTPATIENT
Start: 2021-06-07 | End: 2021-07-21 | Stop reason: SDUPTHER

## 2021-07-02 ENCOUNTER — OFFICE VISIT (OUTPATIENT)
Dept: RHEUMATOLOGY | Facility: CLINIC | Age: 21
End: 2021-07-02
Payer: OTHER GOVERNMENT

## 2021-07-02 VITALS
SYSTOLIC BLOOD PRESSURE: 133 MMHG | HEART RATE: 105 BPM | BODY MASS INDEX: 27.38 KG/M2 | WEIGHT: 145 LBS | DIASTOLIC BLOOD PRESSURE: 81 MMHG | HEIGHT: 61 IN

## 2021-07-02 DIAGNOSIS — M02.30 REACTIVE ARTHRITIS: ICD-10-CM

## 2021-07-02 DIAGNOSIS — F41.9 ANXIETY: ICD-10-CM

## 2021-07-02 DIAGNOSIS — L30.9 ECZEMA, UNSPECIFIED TYPE: ICD-10-CM

## 2021-07-02 DIAGNOSIS — L50.9 URTICARIA: ICD-10-CM

## 2021-07-02 DIAGNOSIS — L40.9 PSORIASIS OF SCALP: Primary | ICD-10-CM

## 2021-07-02 PROCEDURE — 99999 PR PBB SHADOW E&M-EST. PATIENT-LVL III: CPT | Mod: PBBFAC,,, | Performed by: INTERNAL MEDICINE

## 2021-07-02 PROCEDURE — 96372 THER/PROPH/DIAG INJ SC/IM: CPT | Mod: S$GLB,,, | Performed by: INTERNAL MEDICINE

## 2021-07-02 PROCEDURE — 99215 OFFICE O/P EST HI 40 MIN: CPT | Mod: 25,S$GLB,, | Performed by: INTERNAL MEDICINE

## 2021-07-02 PROCEDURE — 96372 PR INJECTION,THERAP/PROPH/DIAG2ST, IM OR SUBCUT: ICD-10-PCS | Mod: S$GLB,,, | Performed by: INTERNAL MEDICINE

## 2021-07-02 PROCEDURE — 99215 PR OFFICE/OUTPT VISIT, EST, LEVL V, 40-54 MIN: ICD-10-PCS | Mod: 25,S$GLB,, | Performed by: INTERNAL MEDICINE

## 2021-07-02 PROCEDURE — 99999 PR PBB SHADOW E&M-EST. PATIENT-LVL III: ICD-10-PCS | Mod: PBBFAC,,, | Performed by: INTERNAL MEDICINE

## 2021-07-02 RX ORDER — CYANOCOBALAMIN 1000 UG/ML
1000 INJECTION, SOLUTION INTRAMUSCULAR; SUBCUTANEOUS
Status: COMPLETED | OUTPATIENT
Start: 2021-07-02 | End: 2021-07-02

## 2021-07-02 RX ORDER — HYDROXYZINE PAMOATE 25 MG/1
25 CAPSULE ORAL 4 TIMES DAILY
Qty: 120 CAPSULE | Refills: 2 | Status: SHIPPED | OUTPATIENT
Start: 2021-07-02 | End: 2021-08-01

## 2021-07-02 RX ORDER — CLOCORTOLONE PIVALATE 0 G/G
CREAM TOPICAL 3 TIMES DAILY
Qty: 90 G | Refills: 6 | Status: SHIPPED | OUTPATIENT
Start: 2021-07-02 | End: 2021-10-04

## 2021-07-02 RX ADMIN — CYANOCOBALAMIN 1000 MCG: 1000 INJECTION, SOLUTION INTRAMUSCULAR; SUBCUTANEOUS at 03:07

## 2021-07-02 ASSESSMENT — ROUTINE ASSESSMENT OF PATIENT INDEX DATA (RAPID3)
PAIN SCORE: 4
TOTAL RAPID3 SCORE: 1.33
PATIENT GLOBAL ASSESSMENT SCORE: 0
PSYCHOLOGICAL DISTRESS SCORE: 3
MDHAQ FUNCTION SCORE: 0
FATIGUE SCORE: 1

## 2021-07-14 ENCOUNTER — TELEPHONE (OUTPATIENT)
Dept: DERMATOLOGY | Facility: CLINIC | Age: 21
End: 2021-07-14

## 2021-07-15 ENCOUNTER — PATIENT MESSAGE (OUTPATIENT)
Dept: DERMATOLOGY | Facility: CLINIC | Age: 21
End: 2021-07-15

## 2021-07-21 ENCOUNTER — OFFICE VISIT (OUTPATIENT)
Dept: DERMATOLOGY | Facility: CLINIC | Age: 21
End: 2021-07-21
Payer: OTHER GOVERNMENT

## 2021-07-21 DIAGNOSIS — L81.9 POSTINFLAMMATORY PIGMENTARY CHANGES: ICD-10-CM

## 2021-07-21 DIAGNOSIS — L70.0 ACNE VULGARIS: ICD-10-CM

## 2021-07-21 DIAGNOSIS — L21.9 SEBORRHEIC DERMATITIS: ICD-10-CM

## 2021-07-21 DIAGNOSIS — L20.9 ATOPIC DERMATITIS, UNSPECIFIED TYPE: Primary | ICD-10-CM

## 2021-07-21 DIAGNOSIS — R20.3 SENSITIVE SKIN: ICD-10-CM

## 2021-07-21 PROCEDURE — 99214 PR OFFICE/OUTPT VISIT, EST, LEVL IV, 30-39 MIN: ICD-10-PCS | Mod: S$GLB,,, | Performed by: DERMATOLOGY

## 2021-07-21 PROCEDURE — 99999 PR PBB SHADOW E&M-EST. PATIENT-LVL II: ICD-10-PCS | Mod: PBBFAC,,, | Performed by: DERMATOLOGY

## 2021-07-21 PROCEDURE — 99214 OFFICE O/P EST MOD 30 MIN: CPT | Mod: S$GLB,,, | Performed by: DERMATOLOGY

## 2021-07-21 PROCEDURE — 99999 PR PBB SHADOW E&M-EST. PATIENT-LVL II: CPT | Mod: PBBFAC,,, | Performed by: DERMATOLOGY

## 2021-07-21 RX ORDER — CLINDAMYCIN PHOSPHATE 10 UG/ML
LOTION TOPICAL
Qty: 60 ML | Refills: 6 | Status: SHIPPED | OUTPATIENT
Start: 2021-07-21 | End: 2022-02-01 | Stop reason: SDUPTHER

## 2021-07-21 RX ORDER — TRIAMCINOLONE ACETONIDE 1 MG/G
CREAM TOPICAL
Qty: 454 G | Refills: 1 | Status: SHIPPED | OUTPATIENT
Start: 2021-07-21

## 2021-07-21 RX ORDER — FLUOCINONIDE TOPICAL SOLUTION USP, 0.05% 0.5 MG/ML
SOLUTION TOPICAL 2 TIMES DAILY
Qty: 60 ML | Refills: 2 | Status: SHIPPED | OUTPATIENT
Start: 2021-07-21 | End: 2024-01-12 | Stop reason: ALTCHOICE

## 2021-08-17 ENCOUNTER — TELEPHONE (OUTPATIENT)
Dept: INTERNAL MEDICINE | Facility: CLINIC | Age: 21
End: 2021-08-17

## 2021-09-06 NOTE — PROGRESS NOTES
Visit:11    Diagnosis:   Encounter Diagnosis   Name Primary?    Chronic pain of both knees        Physician: Marylou Barrios MD  Treatment Orders: eval and treat  Occupation: sophomore in college at [employer]      Subjective  Pt reports the ice helped her B hips last visit. Pt states having mild pain on B hips and L knee.     Objective          Lower Extremity Strength  Right LE  Left LE    Knee extension: 5/5 Knee extension: 5/5   Knee flexion: 5/5 Knee flexion: 4+/5   Hip flexion: 5/5 Hip flexion: 5/5   Hip extension:  5/5 Hip extension: 4+/5   Hip abduction/Glut Medius: 4+/5 Hip abduction/Glut medius 4/5   Hip adduction: 5/5 Hip adduction: 5/5   Hip internal rotation: 5/5 Hip internal rotation: 5/5   Hip external rotation: 5/5 Hip external rotation:   5/5   Ankle dorsiflexion:   5/5 Ankle dorsiflexion:   4+/5   Ankle plantarflexion: 5/5 Ankle plantarflexion: 5/5       Treatment:  Patella tendon STM/friction massage x 10 min (NP)  Pt performed there ex's for L knee strengthening, ROM, flexibility and endurance including:    McConnel taping performed to L patella for medial glide prior to exercises. (NP)  Bike 10 min  QS 2x10 with SLR 5/5 sec hold   SAQ 3x10 0#  LLR 3x10 B  Backwards lunge with opposite arm row with hip ext 3x10 B   bridge B to unilat 2x10  Clamshell 3x10 GTB  Monster walk backwards  BTB 1 lap  Wall sit 3x30 with BTB (NP)  Shuttle B to unilat 50# 3x10  LAQ with 2#wt eccentric lowering only 3x10 (NP)  Hip elevation 2 x 15 reps (NP)   Hip extension on shuttle 15# 2 x 10 reps (NP)  Ice for 10 minutes L knee  Exercises were reviewed and pt was able to demonstrate them prior to the end of the session.     Pt gave verbal consent to dry needling Rx.   Dry needling with trigger point/manual therapy techniques was performed to B hip flexor and adductor with 2 inch needles.  All needles were removed and any changes in signs and symptoms  Nephrology Consult Note     Patient: Karen Lu Date: 9/6/2021   YOB: 1949 Admission Date: 9/3/2021   MRN: [id number] Attending: Ti Bhardwaj MD     Reason for Consultation: CKD, s/p cardiac cath   Requesting Physician: Ti Bhardwaj MD  Consulting Physician: Tolu Eli MD     Chief Complaint: Shortness of breath     History of Present Illness:  This patient is a 72 year old female with a PMHx significant for CKD stage 3, HTN, SLE, RA, who presented to [facility]/2/2021 with c/o shortness of breath.  Progressive over the past days-weeks.  Has noted increased LE edema.  Found to have NSTEMI, new LV dysfunction (LVEF 36%), A-fib RVR.  Was transferred to [facility] 09/03, completed LHC on 09/04 with PCI to LCx.     Nephrology was consulted for elevated creatinine in pt with CKD 3 in setting of cardiac catheterization.  Pt follows with Dr. Kenan Mendez in [place] for CKD 3 with non-nephrotic range proteinuria on ARB.  Baseline creatinine then was around 1.1 - 1.3, now up to 1.8 range.  Pt reports polyuria, +UA with e.coli UTI noted.        Past Medical History:   Diagnosis Date   • Anemia    • Arthritis    • B-COMPLEX DEFIC NEC, low b12 7/1/2004   • Calculus of kidney Last ~1988    Kidney Stone x10   • Chronic pain     Bilateral knees, elbows, hips   • CKD (chronic kidney disease), stage III (CMS/[facility])    • Fracture     Right elbow   • Headache(784.0) 11/2000    Headaches (chronic) w/occ'l migraine attack -    • History of bone density study 2/2/2015    bone density ap hip and ap lumbar spine decreased by a significant 10.5%   • HYPERLIPIDEMIA NEC/NOS., low hdl 9/24/2002    pt declines   • Palpitations 6/28/2014   • Pneumonia    • Rheumatoid arthritis(714.0)    • Systemic lupus erythematosus (CMS/HCC) 2/4/05    GERI IgG Antibodies:  >8.0   • Unspecified essential hypertension    • Urinary tract infection        Past Surgical History:   Procedure Laterality Date   •  Albumin level  6/23/05    3.5   • Beta 2 microglobulin serum  1/26/05    5.19 (nl range 0.70-1.80)   • Bone imaging whole body  7/27/04    normal   • Cataract extraction, bilateral Bilateral 8/2019, 9/2019 8/2019, 9/2019   • Ch50 total complement activity  2/4/05    less than 10 (nl range 30-75)   • Closed rx elbow dislocation  5/13/07    Posterior fracture-dislocation of the right elbow. Nondisplaced radial neck fracture with a probable fracture.   • Complement c3  2/4/05    47 (nl range    • Complement c4  2/4/05    3 (nl range 10-40   • Ct chest w/contrast  08/28/2007    No evidence of bilateral axillary or mediastinal lymphadenopathy. Stable densities in the right middle lobe and fibrosis in the lingula. Areas of faint increased density scattered throughout the thoracic vertebral bodies including posterior elements.   • Ct pelvis  6/14/04    CT thorax and pelvis   • Cystourethroscopy w/rem calcul  ~1978    Basket    • Dexa bone density axial skeleton  3/23/05    Normal   • Incision eardrum,aspir  1/29/03   • Joint replacement Right 2005    elbow   • Myeloperoxidase and protease 3 ab  2/4/05     P-ANCA positive   • Removal gallbladder      Cholecystectomy   • Remove tonsils/adenoids,<11 y/o      T & A   • Tonsillectomy     • X-ray chest 2 vw  3/7/05    Rt upper lobe moderate infiltrate, streaky atelectasis or infiltrate at left base, small pleural effusion   • X-ray chest 2 vw  3/23/05    Improvement in right upper lobe and left lower lobe areas of pneumonia .   • Xray mammogram screening bilat  2/2/2015    negative, [facility]       Prior to Admission medications    Medication Sig Start Date End Date Taking? Authorizing Provider   predniSONE (DELTASONE) 5 MG tablet TAKE 1 TABLET BY MOUTH DAILY ALONG WITH THREE 1 MG TABLETS TO EQUAL 8 MG DAILY 9/2/21   Leti Castillo MD   cyclobenzaprine (FLEXERIL) 10 MG tablet Take 10 mg by mouth 3 times daily as needed for Muscle spasms.    Outside  were noted.  Dry needling was performed to decrease inflammation, increase circulation, decrease pain and restore homeostasis.     Assessment  Patient tolerated exercises without increase in symptoms. Pt will benefit from physcial therapy services in order to maximize pain free and/or functional use of left knee. The following goals were discussed with the patient and patient is in agreement with them as to be addressed in the treatment plan.     Problem List: pain, decreased strength and inability to participate fully in vocation pursuits.      GOALS: Short Term Goals:  3 weeks  1. Pt to report decreased knee pain to 6/10 at worst..  2. Independent with HEP to increase patient's tolerance for exercise progression.    Long Term Goals: 6 weeks  1.Report decreased    L knee pain  <   / =  2  /10 with sport activities.   2.Increased MMT  for  L hip to 5/5 to increase tolerance for sport activities.   3. Pt will report improvement in overall functional abilities of LE, evidenced by improved score on knee FOTO survey.    Plan  Pt will be treated by physical therapy 2 times a week for 6 weeks for Pt Education, HEP, therapeutic exercises, neuromuscular re-education/ balance exercises, joint mobilizations, modalities prn   to achieve established goals. Marilyn may at times be seen by a PTA as part of the Rehab Team.     Cont PT for  6   weeks.       Provider   Cholecalciferol (Vitamin D) 50 mcg (2,000 units) tablet Take 50 mcg by mouth daily.    Outside Provider   rosuvastatin (CRESTOR) 10 MG tablet Take 1 tablet by mouth daily. 7/13/21   Leti Castillo MD   fluticasone (CUTIVATE) 0.05 % cream Apply topically 2 times daily. 5/16/21   Albino Stockton MD   gabapentin (NEURONTIN) 300 MG capsule TAKE 1 CAPSULE BY MOUTH TWICE DAILY 12/22/20   Leti Castillo MD   Polyethyl Glycol-Propyl Glycol (SYSTANE ULTRA OP) Indications: 2 times a day    Outside Provider   acetaminophen (TYLENOL) 500 MG tablet Take 500 mg by mouth every 6 hours as needed for Pain.    Outside Provider   traMADol (ULTRAM) 50 MG tablet TAKE 1 TABLET BY MOUTH EVERY 6 HOURS AS NEEDED FOR PAIN 10/5/20   Leti Castillo MD   furosemide (LASIX) 20 MG tablet Take 20 mg by mouth daily.  8/18/20   Outside Provider   losartan (COZAAR) 100 MG tablet Take 1 tablet by mouth daily. 6/9/20   Leti Castillo MD   azaTHIOprine (IMURAN) 50 MG tablet Take 50 mg by mouth 2 times daily.  12/26/19   Outside Provider   CANNABIDIOL PO Take 1 tablet by mouth daily as needed.     Outside Provider   diphenhydrAMINE-APAP, sleep,  MG/30ML Liquid     Outside Provider   ondansetron (ZOFRAN ODT) 8 MG disintegrating tablet Place 1 tablet onto the tongue every 8 hours as needed for Nausea. 12/23/19   Lulu Valverde MD   cycloSPORINE (RESTASIS) 0.05 % ophthalmic emulsion Apply 1 drop to eye 2 times daily. 5/6/16   Outside Provider   omeprazole (PrilOSEC) 20 MG capsule Take 20 mg by mouth daily.     Outside Provider   predniSONE (DELTASONE) 1 MG tablet TAKE 3 TABLETS BY MOUTH EVERY DAY ALONG WITH 5 MG TABLET 8/30/18   Lulu Valverde MD   loratadine (CLARITIN) 10 MG tablet Take 1 tablet by mouth daily. 12/5/17   Lulu Valverde MD   ciprofloxacin (CIPRO) 500 MG tablet Take 1 tablet by mouth 2 times daily. AS NEEDED FOR TRAVELLER'S DIARRHEA 1/25/17   Lulu Valverde MD       ALLERGIES:   Allergen Reactions   • Levofloxacin RASH   • Sulfa  Antibiotics RASH     swelling/hives   • Zithromax [Azithromycin Dihydrate] GI UPSET and HEADACHES       Primary MD: Leti Castillo MD    Family History   Family History   Problem Relation Age of Onset   • Cancer Father         bone cancer   • Genitourinary Father         RCC/Stones on fathers side of family   • Cancer Sister         breast   • Myocardial Infarction Sister 49   • Stroke Sister        Social History   Social History     Tobacco Use   • Smoking status: Never Smoker   • Smokeless tobacco: Never Used   Substance Use Topics   • Alcohol use: No     Alcohol/week: 0.0 - 1.0 standard drinks     Comment: one drink monthly   • Drug use: No     Social History     Social History Narrative   • Not on file       Review of Systems    Constitutional:  Denies fever, chills  Respiratory:  Denies SOB, cough  Cardiovascular:  Denies CP, palpitations  GI:  Denies nausea, vomiting, diarrhea, bloody or dark stools  :  +Polyuria, no dysuria   Musculoskeletal:  Denies cramping, weakness  Integumentary:  Denies rash, open wounds  Neurologic:  Denies headache, dizziness         Physical Exam  Vital Signs (most recent):   Visit Vitals  /73 (BP Location: LUE - Left upper extremity, Patient Position: Semi-Fan's)   Pulse 100   Temp 97.5 °F (36.4 °C) (Oral)   Resp 18   Ht 5' 4\" (1.626 m)   Wt 73.5 kg   SpO2 96%   BMI 27.82 kg/m²     Gen  NAD  ENT  MMM  Neck supple, no JVD with trachea midline  Cardiac  RRR, S1, S2 no S3  Resp  CTA    ABD  Soft , non tender, no hepatosplenmegaly, BS normoactive  Ext  No significant edema   Neuro A+O X's 3         LABS:  Recent Results (from the past 24 hour(s))   Partial Thromboplastin Time    Collection Time: 09/06/21  5:56 AM   Result Value Ref Range    PTT 27 22 - 30 sec   Comprehensive Metabolic Panel    Collection Time: 09/06/21  5:56 AM   Result Value Ref Range    Fasting Status      Sodium 139 135 - 145 mmol/L    Potassium 3.3 (L) 3.4 - 5.1 mmol/L    Chloride 102 98 - 107 mmol/L     Carbon Dioxide 26 21 - 32 mmol/L    Anion Gap 14 10 - 20 mmol/L    Glucose 94 65 - 99 mg/dL    BUN 36 (H) 6 - 20 mg/dL    Creatinine 1.85 (H) 0.51 - 0.95 mg/dL    Glomerular Filtration Rate 26 (L) >=60    BUN/ Creatinine Ratio 19 7 - 25    Calcium 8.9 8.4 - 10.2 mg/dL    Bilirubin, Total 0.7 0.2 - 1.0 mg/dL    GOT/AST 24 <=37 Units/L    GPT/ALT 23 <64 Units/L    Alkaline Phosphatase 65 45 - 117 Units/L    Albumin 3.2 (L) 3.6 - 5.1 g/dL    Protein, Total 7.9 6.4 - 8.2 g/dL    Globulin 4.7 (H) 2.0 - 4.0 g/dL    A/G Ratio 0.7 (L) 1.0 - 2.4   Magnesium    Collection Time: 09/06/21  5:56 AM   Result Value Ref Range    Magnesium 2.4 1.7 - 2.4 mg/dL   Phosphorus    Collection Time: 09/06/21  5:56 AM   Result Value Ref Range    Phosphorus 4.5 2.4 - 4.7 mg/dL   Lactate Dehydrogenase    Collection Time: 09/06/21  5:56 AM   Result Value Ref Range    LD, Total 279 (H) 82 - 240 Units/L   CBC with Automated Differential (performable only)    Collection Time: 09/06/21  5:56 AM   Result Value Ref Range    WBC 7.5 4.2 - 11.0 K/mcL    RBC 4.71 4.00 - 5.20 mil/mcL    HGB 11.3 (L) 12.0 - 15.5 g/dL    HCT 38.0 36.0 - 46.5 %    MCV 80.7 78.0 - 100.0 fl    MCH 24.0 (L) 26.0 - 34.0 pg    MCHC 29.7 (L) 32.0 - 36.5 g/dL    RDW-CV 18.7 (H) 11.0 - 15.0 %    RDW-SD 53.1 (H) 39.0 - 50.0 fL     140 - 450 K/mcL    NRBC 0 <=0 /100 WBC    Neutrophil, Percent 73 %    Lymphocytes, Percent 11 %    Mono, Percent 12 %    Eosinophils, Percent 2 %    Basophils, Percent 1 %    Immature Granulocytes 1 %    Absolute Neutrophils 5.5 1.8 - 7.7 K/mcL    Absolute Lymphocytes 0.8 (L) 1.0 - 4.0 K/mcL    Absolute Monocytes 0.9 0.3 - 0.9 K/mcL    Absolute Eosinophils  0.1 0.0 - 0.5 K/mcL    Absolute Basophils 0.1 0.0 - 0.3 K/mcL    Absolute Immmature Granulocytes 0.1 0.0 - 0.2 K/mcL       Recent Labs   Lab 09/06/21  0556 09/05/21  0634 09/04/21  1311   SODIUM 139 138 138   POTASSIUM 3.3* 3.9 3.2*   CHLORIDE 102 101 99   CO2 26 27 27   BUN 36* 31* 31*    CREATININE 1.85* 1.81* 1.76*   GLUCOSE 94 117* 124*   CALCIUM 8.9 9.2 9.3         MICROBIOLOGY:  Blood Culture  No results found. However, due to the size of the patient record, not all encounters were searched. Please check Results Review for a complete set of results.  Urine Culture  Results for orders placed or performed in visit on 12/24/19   URINE CULTURE   Result Value Ref Range    Specimen Description URINE, CLEAN CATCH/MIDSTREAM     CULTURE <10,000 CFU/mL GRAM POSITIVE ANGELIQUE     REPORT STATUS 12/25/2019 FINAL        IMAGING:  Cath/PV Case   Final Result        EKG Interpretation   Results for orders placed or performed during the hospital encounter of 09/03/21   Electrocardiogram 12-Lead   Result Value Ref Range    Ventricular Rate EKG/Min (BPM) 112     Atrial Rate (BPM) 110     QRS-Interval (MSEC) 84     QT-Interval (MSEC) 324     QTc 442     R Axis (Degrees) 15     T Axis (Degrees) 172     REPORT TEXT       Atrial fibrillation  with rapid ventricular response  Nonspecific ST and T wave abnormality  When compared with ECG of  02-SEP-2021 00:30,  Nonspecific T wave abnormality, worse in  Lateral leads  Confirmed by CEE ZACARIAS, M.E. ([id number]) on 9/4/2021 7:59:24 AM            Assessment / Plan     Acute Kidney Injury, non-oliguric, multifactorial with UTI, new HFrEF, A-fib RVR   -Avoid nephrotoxins, contrast, fleet enemas, NSAIDs   -Pharmacy to ensure medications adjusted for reduced CrCl    -Strict I&O, Daily weights    Chronic Kidney Disease, stage 3, previous baseline creatinine around 1.1 - 1.3    Hypertension    +UA, e.coli, on antibiotics per primary     NSTEMI, s/p PCI to LCx (09/04)  HFrEF, LVEF 36%  A-Fib RVR, with improved rate control     SLE, with chronic hypocomplementemia. Dr Mendez has elected not to pursue a renal biopsy given the stability of her renal fxn.   -On Prednisone / Azathioprine and plaquenil         Recs  BEAR multifactorial in setting of UTI, NSTEMI with new LV dysfunction, A-Fib  RVR    Creatinine stable ~ 1.8  Uncertain if this is new baseline, or possibly a recovering ATN in setting of A-Fib RVR, reduced EF  Good urine output  Supplement K+ PRN as long as good UO  +UA, antibiotics per primary   Avoid nephrotoxins, contrast, fleet enemas, NSAIDs  Pharmacy to ensure medications are adjusted for CrCl  Dr. Dr. Bhardwaj present in the room       BRADLY Asif  09/06/21  Nephrology    Thank you for the opportunity to be involved in this patient's care during their hospitalization. We will continue to follow as needed.    I have examined the patient, reviewed the pertinent diagnostic studies and medical data, and have participated in the development of the treatment plan with the renal team. I have made appropriate addendums and agree with the documentation stated above.   Tolu quinones MD

## 2021-09-28 ENCOUNTER — PATIENT OUTREACH (OUTPATIENT)
Dept: ADMINISTRATIVE | Facility: OTHER | Age: 21
End: 2021-09-28

## 2021-09-30 ENCOUNTER — PATIENT MESSAGE (OUTPATIENT)
Dept: DERMATOLOGY | Facility: CLINIC | Age: 21
End: 2021-09-30

## 2021-10-04 ENCOUNTER — OFFICE VISIT (OUTPATIENT)
Dept: RHEUMATOLOGY | Facility: CLINIC | Age: 21
End: 2021-10-04
Payer: OTHER GOVERNMENT

## 2021-10-04 DIAGNOSIS — M02.39 REACTIVE ARTHRITIS OF MULTIPLE SITES: ICD-10-CM

## 2021-10-04 DIAGNOSIS — L40.50 PSORIATIC ARTHRITIS: Primary | ICD-10-CM

## 2021-10-04 DIAGNOSIS — Z79.1 ENCOUNTER FOR LONG-TERM (CURRENT) USE OF NSAIDS: ICD-10-CM

## 2021-10-04 DIAGNOSIS — R74.01 ELEVATED ALT MEASUREMENT: ICD-10-CM

## 2021-10-04 PROCEDURE — 99213 PR OFFICE/OUTPT VISIT, EST, LEVL III, 20-29 MIN: ICD-10-PCS | Mod: 95,,, | Performed by: PHYSICIAN ASSISTANT

## 2021-10-04 PROCEDURE — 99213 OFFICE O/P EST LOW 20 MIN: CPT | Mod: 95,,, | Performed by: PHYSICIAN ASSISTANT

## 2021-10-04 RX ORDER — IBUPROFEN 800 MG/1
800 TABLET ORAL 3 TIMES DAILY PRN
Qty: 90 TABLET | Refills: 3 | Status: SHIPPED | OUTPATIENT
Start: 2021-10-04

## 2021-10-05 ENCOUNTER — PATIENT MESSAGE (OUTPATIENT)
Dept: RHEUMATOLOGY | Facility: CLINIC | Age: 21
End: 2021-10-05

## 2021-10-05 ENCOUNTER — SPECIALTY PHARMACY (OUTPATIENT)
Dept: PHARMACY | Facility: CLINIC | Age: 21
End: 2021-10-05

## 2021-10-08 ENCOUNTER — LAB VISIT (OUTPATIENT)
Dept: LAB | Facility: HOSPITAL | Age: 21
End: 2021-10-08
Attending: INTERNAL MEDICINE
Payer: OTHER GOVERNMENT

## 2021-10-08 DIAGNOSIS — L30.9 ECZEMA, UNSPECIFIED TYPE: ICD-10-CM

## 2021-10-08 DIAGNOSIS — L40.9 PSORIASIS OF SCALP: ICD-10-CM

## 2021-10-08 LAB
ALBUMIN SERPL BCP-MCNC: 4.2 G/DL (ref 3.5–5.2)
ALP SERPL-CCNC: 85 U/L (ref 38–126)
ALT SERPL W/O P-5'-P-CCNC: 22 U/L (ref 10–44)
ANION GAP SERPL CALC-SCNC: 8 MMOL/L (ref 8–16)
AST SERPL-CCNC: 28 U/L (ref 15–46)
BILIRUB SERPL-MCNC: 0.5 MG/DL (ref 0.1–1)
CALCIUM SERPL-MCNC: 9.1 MG/DL (ref 8.7–10.5)
CHLORIDE SERPL-SCNC: 106 MMOL/L (ref 95–110)
CO2 SERPL-SCNC: 28 MMOL/L (ref 23–29)
CREAT SERPL-MCNC: 0.84 MG/DL (ref 0.5–1.4)
EST. GFR  (AFRICAN AMERICAN): >60 ML/MIN/1.73 M^2
EST. GFR  (NON AFRICAN AMERICAN): >60 ML/MIN/1.73 M^2
GLUCOSE SERPL-MCNC: 91 MG/DL (ref 70–110)
POTASSIUM SERPL-SCNC: 4.1 MMOL/L (ref 3.5–5.1)
PROT SERPL-MCNC: 7.7 G/DL (ref 6–8.4)
SODIUM SERPL-SCNC: 142 MMOL/L (ref 136–145)
UUN UR-MCNC: 10 MG/DL (ref 7–17)

## 2021-10-08 PROCEDURE — 80053 COMPREHEN METABOLIC PANEL: CPT | Mod: PO | Performed by: INTERNAL MEDICINE

## 2021-10-08 PROCEDURE — 36415 COLL VENOUS BLD VENIPUNCTURE: CPT | Mod: PO | Performed by: INTERNAL MEDICINE

## 2021-11-30 ENCOUNTER — HOSPITAL ENCOUNTER (OUTPATIENT)
Dept: RADIOLOGY | Facility: HOSPITAL | Age: 21
Discharge: HOME | End: 2021-11-30
Attending: UROLOGY
Payer: OTHER GOVERNMENT

## 2021-11-30 DIAGNOSIS — N13.30 HYDRONEPHROSIS OF LEFT KIDNEY: ICD-10-CM

## 2021-11-30 DIAGNOSIS — N20.0 NEPHROLITHIASIS: ICD-10-CM

## 2021-11-30 PROCEDURE — 76770 US EXAM ABDO BACK WALL COMP: CPT | Mod: TC,PO

## 2021-12-08 ENCOUNTER — OFFICE VISIT (OUTPATIENT)
Dept: UROLOGY | Facility: CLINIC | Age: 21
End: 2021-12-08
Payer: OTHER GOVERNMENT

## 2021-12-08 ENCOUNTER — TELEPHONE (OUTPATIENT)
Dept: UROLOGY | Facility: CLINIC | Age: 21
End: 2021-12-08

## 2021-12-08 VITALS
SYSTOLIC BLOOD PRESSURE: 112 MMHG | HEART RATE: 89 BPM | OXYGEN SATURATION: 98 % | HEIGHT: 61 IN | WEIGHT: 149.06 LBS | TEMPERATURE: 98 F | BODY MASS INDEX: 28.14 KG/M2 | RESPIRATION RATE: 14 BRPM | DIASTOLIC BLOOD PRESSURE: 68 MMHG

## 2021-12-08 DIAGNOSIS — R30.0 DYSURIA: ICD-10-CM

## 2021-12-08 DIAGNOSIS — N13.30 HYDRONEPHROSIS OF LEFT KIDNEY: ICD-10-CM

## 2021-12-08 DIAGNOSIS — N20.0 NEPHROLITHIASIS: Primary | ICD-10-CM

## 2021-12-08 DIAGNOSIS — R10.9 ABDOMINAL PAIN, UNSPECIFIED ABDOMINAL LOCATION: ICD-10-CM

## 2021-12-08 DIAGNOSIS — R30.0 DYSURIA: Primary | ICD-10-CM

## 2021-12-08 DIAGNOSIS — R31.0 GROSS HEMATURIA: ICD-10-CM

## 2021-12-08 LAB
BACTERIA #/AREA URNS AUTO: NORMAL /HPF
BILIRUB UR QL STRIP: NEGATIVE
CLARITY UR REFRACT.AUTO: CLEAR
COLOR UR AUTO: YELLOW
GLUCOSE UR QL STRIP: NEGATIVE
HGB UR QL STRIP: NEGATIVE
KETONES UR QL STRIP: NEGATIVE
LEUKOCYTE ESTERASE UR QL STRIP: ABNORMAL
MICROSCOPIC COMMENT: NORMAL
NITRITE UR QL STRIP: NEGATIVE
PH UR STRIP: 6 [PH] (ref 5–8)
PROT UR QL STRIP: NEGATIVE
RBC #/AREA URNS AUTO: 1 /HPF (ref 0–4)
SP GR UR STRIP: 1.01 (ref 1–1.03)
SQUAMOUS #/AREA URNS AUTO: 2 /HPF
URN SPEC COLLECT METH UR: ABNORMAL
WBC #/AREA URNS AUTO: 2 /HPF (ref 0–5)

## 2021-12-08 PROCEDURE — 99999 PR PBB SHADOW E&M-EST. PATIENT-LVL III: CPT | Mod: PBBFAC,,, | Performed by: UROLOGY

## 2021-12-08 PROCEDURE — 87086 URINE CULTURE/COLONY COUNT: CPT | Performed by: UROLOGY

## 2021-12-08 PROCEDURE — 99214 PR OFFICE/OUTPT VISIT, EST, LEVL IV, 30-39 MIN: ICD-10-PCS | Mod: S$GLB,,, | Performed by: UROLOGY

## 2021-12-08 PROCEDURE — 87077 CULTURE AEROBIC IDENTIFY: CPT | Performed by: UROLOGY

## 2021-12-08 PROCEDURE — 99214 OFFICE O/P EST MOD 30 MIN: CPT | Mod: S$GLB,,, | Performed by: UROLOGY

## 2021-12-08 PROCEDURE — 99999 PR PBB SHADOW E&M-EST. PATIENT-LVL III: ICD-10-PCS | Mod: PBBFAC,,, | Performed by: UROLOGY

## 2021-12-08 PROCEDURE — 87186 SC STD MICRODIL/AGAR DIL: CPT | Mod: 59 | Performed by: UROLOGY

## 2021-12-08 PROCEDURE — 87088 URINE BACTERIA CULTURE: CPT | Performed by: UROLOGY

## 2021-12-08 PROCEDURE — 81001 URINALYSIS AUTO W/SCOPE: CPT | Performed by: UROLOGY

## 2021-12-08 RX ORDER — POTASSIUM CITRATE 10 MEQ/1
10 TABLET, EXTENDED RELEASE ORAL
Qty: 90 TABLET | Refills: 11 | Status: SHIPPED | OUTPATIENT
Start: 2021-12-08 | End: 2024-01-12 | Stop reason: ALTCHOICE

## 2021-12-10 RX ORDER — CIPROFLOXACIN 500 MG/1
500 TABLET ORAL 2 TIMES DAILY
Qty: 10 TABLET | Refills: 0 | Status: SHIPPED | OUTPATIENT
Start: 2021-12-10 | End: 2021-12-15

## 2021-12-11 LAB
BACTERIA UR CULT: ABNORMAL
BACTERIA UR CULT: ABNORMAL

## 2021-12-16 ENCOUNTER — PATIENT MESSAGE (OUTPATIENT)
Dept: UROLOGY | Facility: CLINIC | Age: 21
End: 2021-12-16
Payer: OTHER GOVERNMENT

## 2021-12-29 ENCOUNTER — IMMUNIZATION (OUTPATIENT)
Dept: PRIMARY CARE CLINIC | Facility: CLINIC | Age: 21
End: 2021-12-29
Payer: OTHER GOVERNMENT

## 2021-12-29 DIAGNOSIS — Z23 NEED FOR VACCINATION: Primary | ICD-10-CM

## 2021-12-29 PROCEDURE — 0004A COVID-19, MRNA, LNP-S, PF, 30 MCG/0.3 ML DOSE VACCINE: CPT | Mod: CV19,PBBFAC | Performed by: INTERNAL MEDICINE

## 2022-01-13 LAB — PAP RECOMMENDATION EXT: NORMAL

## 2022-01-28 ENCOUNTER — PATIENT MESSAGE (OUTPATIENT)
Dept: DERMATOLOGY | Facility: CLINIC | Age: 22
End: 2022-01-28
Payer: OTHER GOVERNMENT

## 2022-02-01 ENCOUNTER — OFFICE VISIT (OUTPATIENT)
Dept: DERMATOLOGY | Facility: CLINIC | Age: 22
End: 2022-02-01
Payer: OTHER GOVERNMENT

## 2022-02-01 ENCOUNTER — PATIENT MESSAGE (OUTPATIENT)
Dept: DERMATOLOGY | Facility: CLINIC | Age: 22
End: 2022-02-01

## 2022-02-01 DIAGNOSIS — L70.0 ACNE VULGARIS: ICD-10-CM

## 2022-02-01 PROCEDURE — 99214 OFFICE O/P EST MOD 30 MIN: CPT | Mod: 95,,, | Performed by: DERMATOLOGY

## 2022-02-01 PROCEDURE — 99214 PR OFFICE/OUTPT VISIT, EST, LEVL IV, 30-39 MIN: ICD-10-PCS | Mod: 95,,, | Performed by: DERMATOLOGY

## 2022-02-01 RX ORDER — DOXYCYCLINE 100 MG/1
TABLET ORAL
Qty: 30 TABLET | Refills: 2 | Status: SHIPPED | OUTPATIENT
Start: 2022-02-01 | End: 2024-01-12 | Stop reason: ALTCHOICE

## 2022-02-01 RX ORDER — ADAPALENE GEL USP, 0.3% 3 MG/G
GEL TOPICAL
Qty: 45 G | Refills: 5 | Status: SHIPPED | OUTPATIENT
Start: 2022-02-01 | End: 2024-01-12 | Stop reason: ALTCHOICE

## 2022-02-01 RX ORDER — CLINDAMYCIN PHOSPHATE 10 UG/ML
LOTION TOPICAL
Qty: 60 ML | Refills: 6 | Status: SHIPPED | OUTPATIENT
Start: 2022-02-01 | End: 2024-01-12 | Stop reason: ALTCHOICE

## 2022-02-01 NOTE — PATIENT INSTRUCTIONS

## 2022-02-01 NOTE — PROGRESS NOTES
Subjective:       Patient ID:  Marilyn Salazar is a 21 y.o. female who presents for No chief complaint on file.    The patient location is: [place]  The chief complaint leading to consultation is:     Visit type: audiovisual    Face to Face time with patient: 15 min  20 minutes of total time spent on the encounter, which includes face to face time and non-face to face time preparing to see the patient (eg, review of tests), Obtaining and/or reviewing separately obtained history, Documenting clinical information in the electronic or other health record, Independently interpreting results (not separately reported) and communicating results to the patient/family/caregiver, or Care coordination (not separately reported).         Each patient to whom he or she provides medical services by telemedicine is:  (1) informed of the relationship between the physician and patient and the respective role of any other health care provider with respect to management of the patient; and (2) notified that he or she may decline to receive medical services by telemedicine and may withdraw from such care at any time.    Notes:   Last seen by Dr. Soler 7/21/21 for atopic dermatitis (rx'd TAC 0.1% cream BID prn), has h/o sebopsoriasis and acne (rec'd OTC differin - tolerating nightly; rx'd clinda lotion (needs refill), rec'dCeraVe AM), possible psoriasis (seeing rheum for reactive arthritis)    Reports acne improved at first but now flaring all of the sudden for the past month or so- mostly whiteheads/blackheads but has noticed more inflammatory/deeper tender bumps.    Rheum started otezla 10/4/21 for psoriasis, psoriatic arthritis- reports rashes on scalp, arms and legs got better - no rash today. Uses topicals (keto shampoo, TAC 0.1% cream, lidex solution) prn, declines refills today.    Method of contraception: depo shot since 2018      Personal history of kidney problems: currently has kidney stones, taking potassium  citrate  Personal history of HTN: denies  Personal hx of breast/ovarian/uterine cancer: denies  Family hx (1st degree relative) of breast/ovarian/uterine cancer: denies        Review of Systems   Skin: Positive for daily sunscreen use. Negative for itching and rash.        Objective:    Physical Exam   Constitutional: She appears well-developed and well-nourished. No distress.   Neurological: She is alert and oriented to person, place, and time. She is not disoriented.   Psychiatric: She has a normal mood and affect.   Skin:   Areas Examined (abnormalities noted in diagram):   Head / Face Inspection Performed              Diagram Legend     Erythematous scaling macule/papule c/w actinic keratosis       Vascular papule c/w angioma      Pigmented verrucoid papule/plaque c/w seborrheic keratosis      Yellow umbilicated papule c/w sebaceous hyperplasia      Irregularly shaped tan macule c/w lentigo     1-2 mm smooth white papules consistent with Milia      Movable subcutaneous cyst with punctum c/w epidermal inclusion cyst      Subcutaneous movable cyst c/w pilar cyst      Firm pink to brown papule c/w dermatofibroma      Pedunculated fleshy papule(s) c/w skin tag(s)      Evenly pigmented macule c/w junctional nevus     Mildly variegated pigmented, slightly irregular-bordered macule c/w mildly atypical nevus      Flesh colored to evenly pigmented papule c/w intradermal nevus       Pink pearly papule/plaque c/w basal cell carcinoma      Erythematous hyperkeratotic cursted plaque c/w SCC      Surgical scar with no sign of skin cancer recurrence      Open and closed comedones      Inflammatory papules and pustules      Verrucoid papule consistent consistent with wart     Erythematous eczematous patches and plaques     Dystrophic onycholytic nail with subungual debris c/w onychomycosis     Umbilicated papule    Erythematous-base heme-crusted tan verrucoid plaque consistent with inflamed seborrheic keratosis     Erythematous  Silvery Scaling Plaque c/w Psoriasis     See annotation          Assessment / Plan:        Acne vulgaris  - flaring; comedonal + inflammatory  -discussed etiology and nature of condition, management options including topicals and oral medications including doxycycline and spironolactone  - will hold off on spironolactone given that she is currently taking potassium and dealing with kidney stones  - will rx increased strength adapalene and course of doxy    -     doxycycline monohydrate 100 mg Tab; Take 1 po qday with food and water. Avoid lying down for 1 hour after taking. Avoid the sun.  Dispense: 30 tablet; Refill: 2  -     adapalene 0.3 % gel; Apply small amount to entire face at night  Dispense: 45 g; Refill: 5  -     clindamycin (CLEOCIN T) 1 % lotion; Thin film to to face qam  Dispense: 60 mL; Refill: 6    - doxycycline: Side effect profile of doxy reviewed including increased sun sensitivity and upset stomach, esophageal inflammation.  Patient was instructed to take with food and water and to avoid lying down for 1 hour after taking. Patient was instructed to not become pregnant while on medication to effects on dental development in fetus; she acknowledged understanding of risks involved.     - topical retinoid: we reviewed that a pea sized amount of the topical retinoid is to be applied to the entire face at night and that it is not spot treatment. Patient to use every other night or 3 nights a week and gradually increase to goal of nightly use (as tolerated). Side effects reviewed to include but not limited to redness, dryness, flaking, burning/tingling sensation, skin irritation, and feeling of tightness. We reviewed that this is not to be used if pregnant.  Recommended discontinuing use for one week prior to waxing.      Declines refills of other topicals (keto shampoo, lidex solution, TAC cream) - can message if she needs refills           Follow up in about 3 months (around 5/1/2022).

## 2022-02-04 ENCOUNTER — OFFICE VISIT (OUTPATIENT)
Dept: RHEUMATOLOGY | Facility: CLINIC | Age: 22
End: 2022-02-04
Payer: OTHER GOVERNMENT

## 2022-02-04 DIAGNOSIS — M17.0 PRIMARY OSTEOARTHRITIS OF BOTH KNEES: ICD-10-CM

## 2022-02-04 DIAGNOSIS — F41.9 ANXIETY: ICD-10-CM

## 2022-02-04 DIAGNOSIS — L40.50 PSORIATIC ARTHRITIS: Primary | ICD-10-CM

## 2022-02-04 PROCEDURE — 99213 PR OFFICE/OUTPT VISIT, EST, LEVL III, 20-29 MIN: ICD-10-PCS | Mod: 95,,, | Performed by: INTERNAL MEDICINE

## 2022-02-04 PROCEDURE — 99213 OFFICE O/P EST LOW 20 MIN: CPT | Mod: 95,,, | Performed by: INTERNAL MEDICINE

## 2022-02-04 RX ORDER — ESCITALOPRAM OXALATE 5 MG/1
5 TABLET ORAL DAILY
Qty: 30 TABLET | Refills: 11 | Status: SHIPPED | OUTPATIENT
Start: 2022-02-04 | End: 2024-01-12 | Stop reason: ALTCHOICE

## 2022-02-04 RX ORDER — IBUPROFEN AND FAMOTIDINE 26.6; 8 MG/1; MG/1
1 TABLET ORAL 3 TIMES DAILY
Qty: 90 TABLET | Refills: 12 | Status: SHIPPED | OUTPATIENT
Start: 2022-02-04 | End: 2023-02-04

## 2022-02-04 NOTE — PROGRESS NOTES
Subjective:       Patient ID: Marilyn Salazar is a 21 y.o. female.    Chief Complaint: No chief complaint on file.    Follow up: 21 year old female who presents to telemedicine virtual visit for follow up on psoriasis, reactive arthritis. She reports increased pain, stiffness, and cramping in her hands and wrists. She is taking ibuprofen 2-3 times per day PRN and this provides minimal relief. She reports rash has improved with topical steroids from Dermatology.     Review of Systems   Constitutional: Negative for activity change, appetite change, chills, fatigue, fever and unexpected weight change.   HENT: Negative for mouth sores and trouble swallowing.    Eyes: Negative for redness and visual disturbance.   Respiratory: Negative for cough and shortness of breath.    Cardiovascular: Negative for chest pain, palpitations and leg swelling.   Gastrointestinal: Negative for abdominal pain, constipation, diarrhea, nausea and vomiting.   Genitourinary: Negative for dysuria and genital sores.   Musculoskeletal: Positive for arthralgias.   Skin: Negative for rash.   Neurological: Negative for dizziness, weakness, light-headedness and headaches.   Hematological: Does not bruise/bleed easily.         Objective:   There were no vitals filed for this visit.    Past Medical History:   Diagnosis Date    Kidney stone     Migraines     Urinary tract infection     Vaginal infection      Past Surgical History:   Procedure Laterality Date    ANKLE SURGERY      left ankle    BALLOON DILATION OF URETER Left 5/7/2020    Procedure: DILATION, URETER, USING BALLOON;  Surgeon: Anoop Aguilar MD;  Location: [facility] OR;  Service: Urology;  Laterality: Left;    CYSTOSCOPY      CYSTOURETEROSCOPY WITH RETROGRADE PYELOGRAPHY AND INSERTION OF STENT INTO URETER Left 5/7/2020    Procedure: CYSTOURETEROSCOPY, WITH RETROGRADE PYELOGRAM AND URETERAL STENT INSERTION;  Surgeon: Anoop Aguilar MD;  Location: [facility] OR;  Service: Urology;   Laterality: Left;    ESOPHAGOGASTRODUODENOSCOPY N/A 11/25/2019    Procedure: EGD (ESOPHAGOGASTRODUODENOSCOPY);  Surgeon: Chino Whelan MD;  Location: [facility];  Service: Colon and Rectal;  Laterality: N/A;    KNEE ARTHROSCOPY Left 2/5/2015    Dr. Barrios     URETEROSCOPIC REMOVAL OF URETERIC CALCULUS Left 5/7/2020    Procedure: REMOVAL, CALCULUS, URETER, URETEROSCOPIC;  Surgeon: Anoop Aguilar MD;  Location: [facility];  Service: Urology;  Laterality: Left;          Physical Exam   Constitutional: She is oriented to person, place, and time.   Neurological: She is alert and oriented to person, place, and time.         Results for orders placed or performed in visit on 12/08/21   Urine culture    Specimen: Urine, Clean Catch   Result Value Ref Range    Urine Culture, Routine ESCHERICHIA COLI  >100,000 cfu/ml   (A)     Urine Culture, Routine PROTEUS MIRABILIS  50,000 - 99,999 cfu/ml   (A)        Susceptibility    Escherichia coli - CULTURE, URINE     Amp/Sulbactam >16/8 Resistant mcg/mL     Ampicillin >16 Resistant mcg/mL     Amox/K Clav'ate <=8/4 Sensitive mcg/mL     Ceftriaxone <=1 Sensitive mcg/mL     Cefazolin 4 Sensitive mcg/mL     Ciprofloxacin <=1 Sensitive mcg/mL     Cefepime <=2 Sensitive mcg/mL     Ertapenem <=0.5 Sensitive mcg/mL     Nitrofurantoin <=32 Sensitive mcg/mL     Gentamicin <=4 Sensitive mcg/mL     Levofloxacin <=2 Sensitive mcg/mL     Meropenem <=1 Sensitive mcg/mL     Piperacillin/Tazo <=16 Sensitive mcg/mL     Trimeth/Sulfa <=2/38 Sensitive mcg/mL     Tobramycin <=4 Sensitive mcg/mL    Proteus mirabilis - CULTURE, URINE     Amp/Sulbactam <=8/4 Sensitive mcg/mL     Ampicillin <=8 Sensitive mcg/mL     Amox/K Clav'ate <=8/4 Sensitive mcg/mL     Ceftriaxone <=1 Sensitive mcg/mL     Cefazolin <=2 Sensitive mcg/mL     Ciprofloxacin <=1 Sensitive mcg/mL     Cefepime <=2 Sensitive mcg/mL     Ertapenem <=0.5 Sensitive mcg/mL     Gentamicin <=4 Sensitive mcg/mL     Levofloxacin <=2 Sensitive mcg/mL      Meropenem <=1 Sensitive mcg/mL     Piperacillin/Tazo <=16 Sensitive mcg/mL     Trimeth/Sulfa <=2/38 Sensitive mcg/mL     Tobramycin <=4 Sensitive mcg/mL   Urinalysis   Result Value Ref Range    Specimen UA Urine, Clean Catch     Color, UA Yellow Yellow, Straw, Noemi    Appearance, UA Clear Clear    pH, UA 6.0 5.0 - 8.0    Specific Gravity, UA 1.010 1.005 - 1.030    Protein, UA Negative Negative    Glucose, UA Negative Negative    Ketones, UA Negative Negative    Bilirubin (UA) Negative Negative    Occult Blood UA Negative Negative    Nitrite, UA Negative Negative    Leukocytes, UA Trace (A) Negative   Urinalysis Microscopic   Result Value Ref Range    RBC, UA 1 0 - 4 /hpf    WBC, UA 2 0 - 5 /hpf    Bacteria Rare None-Occ /hpf    Squam Epithel, UA 2 /hpf    Microscopic Comment SEE COMMENT        Assessment:       1. Psoriatic arthritis    2. Primary osteoarthritis of both knees    3. Anxiety            Plan:       Psoriatic arthritis  -     ibuprofen-famotidine (DUEXIS) 800-26.6 mg Tab; Take 1 tablet by mouth 3 (three) times daily.  Dispense: 90 tablet; Refill: 12  -     Comprehensive Metabolic Panel; Future; Expected date: 02/04/2022  -     CBC Auto Differential; Future; Expected date: 02/04/2022    Primary osteoarthritis of both knees  -     ibuprofen-famotidine (DUEXIS) 800-26.6 mg Tab; Take 1 tablet by mouth 3 (three) times daily.  Dispense: 90 tablet; Refill: 12  -     Comprehensive Metabolic Panel; Future; Expected date: 02/04/2022  -     CBC Auto Differential; Future; Expected date: 02/04/2022    Anxiety  -     EScitalopram oxalate (LEXAPRO) 5 MG Tab; Take 1 tablet (5 mg total) by mouth once daily.  Dispense: 30 tablet; Refill: 11  -     Comprehensive Metabolic Panel; Future; Expected date: 02/04/2022  -     CBC Auto Differential; Future; Expected date: 02/04/2022        Assessment:  21 year old female with  Psoriatic arthritis, psoriasis, reactive arthritis on ibuprofen    Plan:  1. On  otezla but will  consider tremfya if having diarrhea and or  Depression  ,2. Cont ibuprofen PRN  3. Check labs soon    The patient location is: home  The chief complaint leading to consultation is: psa    Visit type: audiovisual    Face to Face time with patient: 26minutes of total time spent on the encounter, which includes face to face time and non-face to face time preparing to see the patient (eg, review of tests), Obtaining and/or reviewing separately obtained history, Documenting clinical information in the electronic or other health record, Independently interpreting results (not separately reported) and communicating results to the patient/family/caregiver, or Care coordination (not separately reported).         Each patient to whom he or she provides medical services by telemedicine is:  (1) informed of the relationship between the physician and patient and the respective role of any other health care provider with respect to management of the patient; and (2) notified that he or she may decline to receive medical services by telemedicine and may withdraw from such care at any time.    Notes:

## 2022-02-07 ENCOUNTER — DOCUMENTATION ONLY (OUTPATIENT)
Dept: RHEUMATOLOGY | Facility: CLINIC | Age: 22
End: 2022-02-07
Payer: OTHER GOVERNMENT

## 2022-02-10 ENCOUNTER — PATIENT MESSAGE (OUTPATIENT)
Dept: DERMATOLOGY | Facility: CLINIC | Age: 22
End: 2022-02-10
Payer: OTHER GOVERNMENT

## 2022-02-11 ENCOUNTER — PATIENT MESSAGE (OUTPATIENT)
Dept: DERMATOLOGY | Facility: CLINIC | Age: 22
End: 2022-02-11
Payer: OTHER GOVERNMENT

## 2022-03-10 ENCOUNTER — PATIENT MESSAGE (OUTPATIENT)
Dept: RHEUMATOLOGY | Facility: CLINIC | Age: 22
End: 2022-03-10
Payer: OTHER GOVERNMENT

## 2022-04-19 ENCOUNTER — NURSE TRIAGE (OUTPATIENT)
Dept: ADMINISTRATIVE | Facility: CLINIC | Age: 22
End: 2022-04-19
Payer: OTHER GOVERNMENT

## 2022-04-19 NOTE — TELEPHONE ENCOUNTER
Reason for Disposition   Decreased hearing in 1 ear and gradual onset    Additional Information   Negative: Followed an ear injury   Negative: Patient sounds very sick or weak to the triager   Negative: Ringing in the ears (tinnitus) and taking aspirin and dosage sounds high (i.e., > 1500 mg/day)   Negative: Hearing loss in one or both ears of sudden onset and present now   Negative: Earache   Negative: Decreased hearing followed sudden, extremely loud noise (e.g., explosion, not just loud concert)   Negative: Decreased hearing and taking medication that can damage hearing (i.e., gentamycin, tobramycin, furosemide, ethacrynic acid, cisplatin, quinidine)   Negative: Patient wants to be seen    Protocols used: HEARING LOSS OR CHANGE-A-OH    Pt stated she has gradual hearing loss in the right ear since January. Pt stated she cannot hear anything out the ear today. Pt advised to see a MD within 3 days and to call [facility] back for worsening symptoms. Pt verbalized understanding. Warm transferred to [place] in scheduling.

## 2022-06-14 ENCOUNTER — HOSPITAL ENCOUNTER (OUTPATIENT)
Dept: RADIOLOGY | Facility: HOSPITAL | Age: 22
Discharge: HOME | End: 2022-06-14
Attending: UROLOGY
Payer: OTHER GOVERNMENT

## 2022-06-14 DIAGNOSIS — R30.0 DYSURIA: ICD-10-CM

## 2022-06-14 DIAGNOSIS — R31.0 GROSS HEMATURIA: ICD-10-CM

## 2022-06-14 DIAGNOSIS — N13.30 HYDRONEPHROSIS OF LEFT KIDNEY: ICD-10-CM

## 2022-06-14 DIAGNOSIS — N20.0 NEPHROLITHIASIS: ICD-10-CM

## 2022-06-14 PROCEDURE — 76770 US EXAM ABDO BACK WALL COMP: CPT | Mod: TC,PO

## 2022-06-15 ENCOUNTER — PATIENT MESSAGE (OUTPATIENT)
Dept: UROLOGY | Facility: CLINIC | Age: 22
End: 2022-06-15
Payer: OTHER GOVERNMENT

## 2022-06-27 ENCOUNTER — TELEPHONE (OUTPATIENT)
Dept: RHEUMATOLOGY | Facility: CLINIC | Age: 22
End: 2022-06-27
Payer: OTHER GOVERNMENT

## 2022-06-27 NOTE — TELEPHONE ENCOUNTER
[facility]acy needs refill of Otzela 30mg. LITZY Rosales LPN gave verbal refill to pharmacist ALEXSANDER NoelPh

## 2022-06-27 NOTE — TELEPHONE ENCOUNTER
----- Message from Claudine Aldrich sent at 6/27/2022  2:52 PM CDT -----  Contact[facility]  Type:  RX Refill Request    [facility]    Refill or New Rx: Refill    RX Name and Strength: apremilast (OTEZLA) 30 mg Tab    How is the patient currently taking it? (ex. 1XDay):       Is this a 30 day or 90 day RX: 30    Preferred Pharmacy with phone number:       [facility]   [address] [place] [address]   [phone]    Local or Mail Order: [facility]      Ordering Provider: man Stewart     Would the patient rather a call back or a response via [facility]?  No     Best Call Back Number:     Additional Information:

## 2024-01-09 NOTE — PROGRESS NOTES
[facility] Progress Note  1/12/2024          Reason for Visit:      had concerns including Establish Care.       History of Present Illness:     Pt is here today to establish care and discuss mood    Grief/ Depression  Pt's mother unexpected passed away in the past several months.  Patient has been having crying episodes, anhedonia, difficulty concentrating, decreased appetite and weight loss.  She says she feels that this is more than just normal grief and that is affecting her ability to function.  She is interested in medications to treat this.  She had previously been on Lexapro for anxiety in the past and tolerated that well.  We discussed the options for treatment and she was agreeable to try the Lexapro again.  We discussed that it could take a couple of weeks to feel the full effect of the medicine.  We advised that needs to be taken every day to work effectively.  We discussed the potential side effects.  She has not having any suicidal ideation but we encouraged her to call 911 or go to the emergency room if that occurs.        Encouraged covid booster at pharmacy  [place] [facility] - will request report      Problem List:     Patient Active Problem List   Diagnosis    Hydronephrosis of left kidney    Nephrolithiasis    Migraine with aura and without status migrainosus, not intractable         Medications:       Current Outpatient Medications:     ibuprofen (ADVIL,MOTRIN) 800 MG tablet, Take 1 tablet (800 mg total) by mouth 3 (three) times daily as needed for Pain., Disp: 90 tablet, Rfl: 3    medroxyPROGESTERone (DEPO-PROVERA) 150 mg/mL Syrg, Inject 150 mg into the muscle every 3 (three) months., Disp: , Rfl:     triamcinolone acetonide 0.1% (KENALOG) 0.1 % cream, AAA arms,neck, thighs bid x 1-2 weeks only prn, Disp: 454 g, Rfl: 1    EScitalopram oxalate (LEXAPRO) 5 MG Tab, Take 1 tablet (5 mg total) by mouth once daily., Disp: 30 tablet, Rfl: 3        Review  "of Systems:     Review of Systems   Constitutional:  Negative for chills and fever.   HENT:  Negative for ear pain and sore throat.    Respiratory:  Negative for cough, shortness of breath and wheezing.    Cardiovascular:  Negative for chest pain and palpitations.   Gastrointestinal:  Negative for constipation, diarrhea, nausea and vomiting.   Genitourinary:  Negative for dysuria and hematuria.   Musculoskeletal:  Negative for joint swelling and joint deformity.   Neurological:  Negative for dizziness and weakness.   Psychiatric/Behavioral:  Positive for dysphoric mood. Negative for sleep disturbance and suicidal ideas.            Physical Exam:     Temp:             Blood Pressure:  115/75        Pulse:   87     Respirations:  14  Weight:   58 kg (127 lb 13.9 oz)  Height:   5' 1" (1.549 m)  BMI:     Body mass index is 24.16 kg/m².    Physical Exam  Constitutional:       General: She is not in acute distress.  HENT:      Right Ear: Tympanic membrane normal.      Left Ear: Tympanic membrane normal.      Nose: No congestion or rhinorrhea.      Mouth/Throat:      Pharynx: No oropharyngeal exudate or posterior oropharyngeal erythema.   Cardiovascular:      Rate and Rhythm: Normal rate and regular rhythm.   Pulmonary:      Effort: Pulmonary effort is normal. No respiratory distress.      Breath sounds: No wheezing.   Abdominal:      Palpations: Abdomen is soft.      Tenderness: There is no abdominal tenderness.   Skin:     General: Skin is warm.   Neurological:      General: No focal deficit present.      Mental Status: She is alert and oriented to person, place, and time.   Psychiatric:         Attention and Perception: Attention normal.         Mood and Affect: Mood is depressed.         Speech: Speech normal.         Behavior: Behavior normal.         Thought Content: Thought content does not include suicidal ideation.         Cognition and Memory: Cognition normal.         Judgment: Judgment normal. "           Labs/Imaging/Testing:     Most recent CBC:  Lab Results   Component Value Date    WBC 7.47 11/24/2020    HGB 12.9 11/24/2020     11/24/2020    MCV 92 11/24/2020       Most recent Lipids:  Lab Results   Component Value Date    CHOL 128 07/09/2019    HDL 41 07/09/2019    LDLCALC 80.6 07/09/2019    TRIG 32 07/09/2019       Most recent Chemistry:  Lab Results   Component Value Date     11/30/2021    K 3.9 11/30/2021     11/30/2021    CO2 24 11/30/2021    BUN 10 11/30/2021    CREATININE 0.78 11/30/2021    GLU 92 11/30/2021    CALCIUM 9.4 11/30/2021    ALT 22 10/08/2021    AST 28 10/08/2021    ALKPHOS 85 10/08/2021    PROT 7.7 10/08/2021    ALBUMIN 4.2 10/08/2021       Other tests:  Lab Results   Component Value Date    TSH 0.527 11/24/2020    FREET4 0.94 11/24/2020    [id number] 11 (L) 07/09/2019    CRP 0.3 11/24/2020    SEDRATE 8 11/24/2020         Assessment and Plan:     1. Well adult exam  - CBC Auto Differential; Future  - Comprehensive Metabolic Panel; Future  - Lipid Panel; Future  - Hemoglobin A1C; Future  - TSH; Future    2. Adjustment disorder with depressed mood  Start lexapro 5 mg po daily  Follow up after 2 weeks if no improvement, or if side effects.  Discussed that once remission achieved, recommend using meds at laest 6 mos before weaning         Dano Birmingham MD  1/12/2024    This note was prepared using voice-recognition software.  Although efforts are made to proofread the note, some errors may persist in the final document.

## 2024-01-12 ENCOUNTER — LAB VISIT (OUTPATIENT)
Dept: LAB | Facility: HOSPITAL | Age: 24
End: 2024-01-12
Attending: INTERNAL MEDICINE
Payer: OTHER GOVERNMENT

## 2024-01-12 ENCOUNTER — OFFICE VISIT (OUTPATIENT)
Dept: PRIMARY CARE CLINIC | Facility: CLINIC | Age: 24
End: 2024-01-12
Payer: OTHER GOVERNMENT

## 2024-01-12 VITALS
HEART RATE: 87 BPM | WEIGHT: 127.88 LBS | HEIGHT: 61 IN | OXYGEN SATURATION: 99 % | RESPIRATION RATE: 14 BRPM | DIASTOLIC BLOOD PRESSURE: 75 MMHG | BODY MASS INDEX: 24.15 KG/M2 | SYSTOLIC BLOOD PRESSURE: 115 MMHG

## 2024-01-12 DIAGNOSIS — F43.21 ADJUSTMENT DISORDER WITH DEPRESSED MOOD: ICD-10-CM

## 2024-01-12 DIAGNOSIS — Z00.00 WELL ADULT EXAM: ICD-10-CM

## 2024-01-12 DIAGNOSIS — Z00.00 WELL ADULT EXAM: Primary | ICD-10-CM

## 2024-01-12 PROBLEM — R31.0 GROSS HEMATURIA: Status: RESOLVED | Noted: 2021-12-08 | Resolved: 2024-01-12

## 2024-01-12 PROBLEM — K21.9 GERD (GASTROESOPHAGEAL REFLUX DISEASE): Status: RESOLVED | Noted: 2019-11-25 | Resolved: 2024-01-12

## 2024-01-12 PROBLEM — M25.662 STIFFNESS OF LEFT KNEE: Status: RESOLVED | Noted: 2020-09-28 | Resolved: 2024-01-12

## 2024-01-12 PROBLEM — R29.898 WEAKNESS OF BOTH HIPS: Status: RESOLVED | Noted: 2020-09-28 | Resolved: 2024-01-12

## 2024-01-12 PROBLEM — Z78.9 DIFFICULTY NAVIGATING STAIRS: Status: RESOLVED | Noted: 2020-09-28 | Resolved: 2024-01-12

## 2024-01-12 PROBLEM — M79.642 BILATERAL HAND PAIN: Status: RESOLVED | Noted: 2020-08-12 | Resolved: 2024-01-12

## 2024-01-12 PROBLEM — M62.81 QUADRICEPS WEAKNESS: Status: RESOLVED | Noted: 2020-09-28 | Resolved: 2024-01-12

## 2024-01-12 PROBLEM — M79.641 BILATERAL HAND PAIN: Status: RESOLVED | Noted: 2020-08-12 | Resolved: 2024-01-12

## 2024-01-12 PROBLEM — R30.0 DYSURIA: Status: RESOLVED | Noted: 2021-12-08 | Resolved: 2024-01-12

## 2024-01-12 LAB
ALBUMIN SERPL BCP-MCNC: 3.9 G/DL (ref 3.5–5.2)
ALP SERPL-CCNC: 70 U/L (ref 55–135)
ALT SERPL W/O P-5'-P-CCNC: 11 U/L (ref 10–44)
ANION GAP SERPL CALC-SCNC: 7 MMOL/L (ref 8–16)
AST SERPL-CCNC: 19 U/L (ref 10–40)
BASOPHILS # BLD AUTO: 0.03 K/UL (ref 0–0.2)
BASOPHILS NFR BLD: 0.6 % (ref 0–1.9)
BILIRUB SERPL-MCNC: 1.1 MG/DL (ref 0.1–1)
BUN SERPL-MCNC: 9 MG/DL (ref 6–20)
CALCIUM SERPL-MCNC: 9.8 MG/DL (ref 8.7–10.5)
CHLORIDE SERPL-SCNC: 108 MMOL/L (ref 95–110)
CHOLEST SERPL-MCNC: 142 MG/DL (ref 120–199)
CHOLEST/HDLC SERPL: 3.2 {RATIO} (ref 2–5)
CO2 SERPL-SCNC: 23 MMOL/L (ref 23–29)
CREAT SERPL-MCNC: 0.8 MG/DL (ref 0.5–1.4)
DIFFERENTIAL METHOD BLD: NORMAL
EOSINOPHIL # BLD AUTO: 0.1 K/UL (ref 0–0.5)
EOSINOPHIL NFR BLD: 1.3 % (ref 0–8)
ERYTHROCYTE [DISTWIDTH] IN BLOOD BY AUTOMATED COUNT: 12.6 % (ref 11.5–14.5)
EST. GFR  (NO RACE VARIABLE): >60 ML/MIN/1.73 M^2
ESTIMATED AVG GLUCOSE: 97 MG/DL (ref 68–131)
GLUCOSE SERPL-MCNC: 80 MG/DL (ref 70–110)
HBA1C MFR BLD: 5 % (ref 4–5.6)
HCT VFR BLD AUTO: 42.8 % (ref 37–48.5)
HDLC SERPL-MCNC: 45 MG/DL (ref 40–75)
HDLC SERPL: 31.7 % (ref 20–50)
HGB BLD-MCNC: 14.1 G/DL (ref 12–16)
IMM GRANULOCYTES # BLD AUTO: 0.01 K/UL (ref 0–0.04)
IMM GRANULOCYTES NFR BLD AUTO: 0.2 % (ref 0–0.5)
LDLC SERPL CALC-MCNC: 90 MG/DL (ref 63–159)
LYMPHOCYTES # BLD AUTO: 2.2 K/UL (ref 1–4.8)
LYMPHOCYTES NFR BLD: 45.7 % (ref 18–48)
MCH RBC QN AUTO: 29.6 PG (ref 27–31)
MCHC RBC AUTO-ENTMCNC: 32.9 G/DL (ref 32–36)
MCV RBC AUTO: 90 FL (ref 82–98)
MONOCYTES # BLD AUTO: 0.4 K/UL (ref 0.3–1)
MONOCYTES NFR BLD: 9 % (ref 4–15)
NEUTROPHILS # BLD AUTO: 2.1 K/UL (ref 1.8–7.7)
NEUTROPHILS NFR BLD: 43.2 % (ref 38–73)
NONHDLC SERPL-MCNC: 97 MG/DL
NRBC BLD-RTO: 0 /100 WBC
PLATELET # BLD AUTO: 281 K/UL (ref 150–450)
PMV BLD AUTO: 11.7 FL (ref 9.2–12.9)
POTASSIUM SERPL-SCNC: 3.9 MMOL/L (ref 3.5–5.1)
PROT SERPL-MCNC: 7.5 G/DL (ref 6–8.4)
RBC # BLD AUTO: 4.76 M/UL (ref 4–5.4)
SODIUM SERPL-SCNC: 138 MMOL/L (ref 136–145)
TRIGL SERPL-MCNC: 35 MG/DL (ref 30–150)
TSH SERPL DL<=0.005 MIU/L-ACNC: 0.65 UIU/ML (ref 0.4–4)
WBC # BLD AUTO: 4.79 K/UL (ref 3.9–12.7)

## 2024-01-12 PROCEDURE — 99395 PREV VISIT EST AGE 18-39: CPT | Mod: S$GLB,,, | Performed by: INTERNAL MEDICINE

## 2024-01-12 PROCEDURE — 84443 ASSAY THYROID STIM HORMONE: CPT | Performed by: INTERNAL MEDICINE

## 2024-01-12 PROCEDURE — 83036 HEMOGLOBIN GLYCOSYLATED A1C: CPT | Performed by: INTERNAL MEDICINE

## 2024-01-12 PROCEDURE — 99999 PR PBB SHADOW E&M-EST. PATIENT-LVL IV: CPT | Mod: PBBFAC,,, | Performed by: INTERNAL MEDICINE

## 2024-01-12 PROCEDURE — 80061 LIPID PANEL: CPT | Performed by: INTERNAL MEDICINE

## 2024-01-12 PROCEDURE — 85025 COMPLETE CBC W/AUTO DIFF WBC: CPT | Performed by: INTERNAL MEDICINE

## 2024-01-12 PROCEDURE — 80053 COMPREHEN METABOLIC PANEL: CPT | Performed by: INTERNAL MEDICINE

## 2024-01-12 PROCEDURE — 36415 COLL VENOUS BLD VENIPUNCTURE: CPT | Mod: PN | Performed by: INTERNAL MEDICINE

## 2024-01-12 RX ORDER — ESCITALOPRAM OXALATE 5 MG/1
5 TABLET ORAL DAILY
Qty: 30 TABLET | Refills: 3 | Status: SHIPPED | OUTPATIENT
Start: 2024-01-12 | End: 2025-01-11

## 2024-01-26 ENCOUNTER — PATIENT OUTREACH (OUTPATIENT)
Dept: ADMINISTRATIVE | Facility: HOSPITAL | Age: 24
End: 2024-01-26
Payer: OTHER GOVERNMENT

## 2024-01-26 NOTE — LETTER
AUTHORIZATION FOR RELEASE OF   CONFIDENTIAL INFORMATION      We are seeing Marilyn Salazar, date of birth 2000, in the clinic at [facility]. Dano Birmingham MD is the patient's PCP. Marilyn Salazar has an outstanding lab/procedure at the time we reviewed her chart. In order to help keep her health information updated, she has authorized us to request the following medical record(s):        (  )  MAMMOGRAM                                      (  )  COLONOSCOPY      (  x)  PAP SMEAR                                          (  )  OUTSIDE LAB RESULTS     (  )  DEXA SCAN                                          (  )  EYE EXAM            (  )  FOOT EXAM                                          (  )  ENTIRE RECORD     (  )  OUTSIDE IMMUNIZATIONS                 (  )  _______________         Please fax records to [facility], Mizell, Joshua E., MD, [phone]     If you have any questions, please contact Amanda at [phone].           Patient Name: Marilyn Salazar  : 2000  Patient Phone #: [phone]

## 2024-01-26 NOTE — PROGRESS NOTES

## 2024-02-16 ENCOUNTER — PATIENT MESSAGE (OUTPATIENT)
Dept: PRIMARY CARE CLINIC | Facility: CLINIC | Age: 24
End: 2024-02-16
Payer: OTHER GOVERNMENT

## 2024-02-28 LAB — PAP RECOMMENDATION EXT: NORMAL
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
## 2024-03-01 ENCOUNTER — OFFICE VISIT (OUTPATIENT)
Dept: PRIMARY CARE CLINIC | Facility: CLINIC | Age: 24
End: 2024-03-01
Payer: OTHER GOVERNMENT

## 2024-03-01 DIAGNOSIS — N92.6 ABNORMAL MENSES: ICD-10-CM

## 2024-03-01 DIAGNOSIS — F43.21 ADJUSTMENT DISORDER WITH DEPRESSED MOOD: Primary | ICD-10-CM

## 2024-03-01 PROCEDURE — 99214 OFFICE O/P EST MOD 30 MIN: CPT | Mod: 95,,, | Performed by: INTERNAL MEDICINE

## 2024-03-11 RX ORDER — ESCITALOPRAM OXALATE 5 MG/1
5 TABLET ORAL DAILY
Qty: 90 TABLET | Refills: 3 | Status: SHIPPED | OUTPATIENT
Start: 2024-03-11 | End: 2024-05-09

## 2024-03-11 NOTE — TELEPHONE ENCOUNTER
No care due was identified.  [facility] Embedded Care Due Messages. Reference number: [id number].   3/11/2024 9:00:32 AM CDT

## 2024-03-11 NOTE — TELEPHONE ENCOUNTER
Refill Routing Note   Medication(s) are not appropriate for processing by [facility] for the following reason(s):        New or recently adjusted medication    [facility] action(s):  Defer        Medication Therapy Plan: Insurance requesting 90 day supply      Appointments  past 12m or future 3m with PCP    Date Provider   Last Visit   3/1/2024 Dano Birmingham MD   Next Visit   Visit date not found Dano Birmingham MD   ED visits in past 90 days: 0        Note composed:9:06 AM 03/11/2024

## 2024-03-27 NOTE — PROGRESS NOTES
Visit:7    Diagnosis:   Encounter Diagnosis   Name Primary?    Chronic pain of both knees        Physician: Marylou Barrios MD  Treatment Orders: eval and treat  Occupation: sophomore in college at [employer]      Subjective  Pt states she has B hip flexor pain L>R.    Objective          Lower Extremity Strength  Right LE  Left LE    Knee extension: 5/5 Knee extension: 5/5   Knee flexion: 5/5 Knee flexion: 4+/5   Hip flexion: 5/5 Hip flexion: 5/5   Hip extension:  5/5 Hip extension: 4+/5   Hip abduction/Glut Medius: 4+/5 Hip abduction/Glut medius 4/5   Hip adduction: 5/5 Hip adduction: 5/5   Hip internal rotation: 5/5 Hip internal rotation: 5/5   Hip external rotation: 5/5 Hip external rotation:   5/5   Ankle dorsiflexion:   5/5 Ankle dorsiflexion:   4+/5   Ankle plantarflexion: 5/5 Ankle plantarflexion: 5/5       Treatment:  Patella tendon STM/friction massage x 10 min (NP)  Pt performed there ex's for L knee strengthening, ROM, flexibility and endurance including:    McConnel taping performed to L patella for medial glide prior to exercises.   Bike 10 min  QS 2x10 with 10 sec hold with patella taped for medial glide  SLR 3x10(NP)  SAQ 3x10 0#  LLR 3x10   bridge B to unilat 2x10  Clamshell 3x10 GTB  Monster walk backwards  BTB 1 lap  Wall sit 3x30 with BTB (NP)  Shuttle B to unilat 50# 3x10  LAQ with 2#wt eccentric lowering only 3x10  Hip elevation 2 x 15 reps (NP)  Hip extension on shuttle 15# 2 x 10 reps (NP)  Ice for 10 minutes L knee  Exercises were reviewed and pt was able to demonstrate them prior to the end of the session.     Pt signed written consent to dry needling Rx.   Dry needling with trigger point/manual therapy techniques was performed to B hip flexor and adductor with 2 inch needles.  All needles were removed and any changes in signs and symptoms were noted.  Dry needling was performed to decrease inflammation, increase    Problem: Discharge Planning  Goal: Discharge to home or other facility with appropriate resources  3/27/2024 1253 by Kala Otero RN  Outcome: Adequate for Discharge  3/27/2024 1251 by Kala Otero RN  Outcome: Progressing     Problem: Pain  Goal: Verbalizes/displays adequate comfort level or baseline comfort level  Outcome: Adequate for Discharge     Problem: Skin/Tissue Integrity  Goal: Absence of new skin breakdown  Description: 1.  Monitor for areas of redness and/or skin breakdown  2.  Assess vascular access sites hourly  3.  Every 4-6 hours minimum:  Change oxygen saturation probe site  4.  Every 4-6 hours:  If on nasal continuous positive airway pressure, respiratory therapy assess nares and determine need for appliance change or resting period.  Outcome: Adequate for Discharge     Problem: ABCDS Injury Assessment  Goal: Absence of physical injury  Outcome: Adequate for Discharge     Problem: Safety - Adult  Goal: Free from fall injury  Outcome: Adequate for Discharge     Problem: Chronic Conditions and Co-morbidities  Goal: Patient's chronic conditions and co-morbidity symptoms are monitored and maintained or improved  Outcome: Adequate for Discharge     Problem: Respiratory - Adult  Goal: Achieves optimal ventilation and oxygenation  Outcome: Adequate for Discharge      circulation, decrease pain and restore homeostasis.           Assessment  Patient with good tolerance to exercises with just a little pain in tendon when fatigued. Pt will benefit from physcial therapy services in order to maximize pain free and/or functional use of left knee. The following goals were discussed with the patient and patient is in agreement with them as to be addressed in the treatment plan.     Problem List: pain, decreased strength and inability to participate fully in vocation pursuits.      GOALS: Short Term Goals:  3 weeks  1. Pt to report decreased knee pain to 6/10 at worst..  2. Independent with HEP to increase patient's tolerance for exercise progression.    Long Term Goals: 6 weeks  1.Report decreased    L knee pain  <   / =  2  /10 with sport activities.   2.Increased MMT  for  L hip to 5/5 to increase tolerance for sport activities.   3. Pt will report improvement in overall functional abilities of LE, evidenced by improved score on knee FOTO survey.    Plan  Pt will be treated by physical therapy 2 times a week for 6 weeks for Pt Education, HEP, therapeutic exercises, neuromuscular re-education/ balance exercises, joint mobilizations, modalities prn   to achieve established goals. Marilyn may at times be seen by a PTA as part of the Rehab Team.     Cont PT for  6   weeks.

## 2024-04-24 ENCOUNTER — PATIENT OUTREACH (OUTPATIENT)
Dept: ADMINISTRATIVE | Facility: HOSPITAL | Age: 24
End: 2024-04-24
Payer: OTHER GOVERNMENT

## 2024-05-09 ENCOUNTER — PATIENT MESSAGE (OUTPATIENT)
Dept: PRIMARY CARE CLINIC | Facility: CLINIC | Age: 24
End: 2024-05-09
Payer: OTHER GOVERNMENT

## 2024-05-09 RX ORDER — ESCITALOPRAM OXALATE 10 MG/1
10 TABLET ORAL DAILY
Qty: 30 TABLET | Refills: 3 | Status: SHIPPED | OUTPATIENT
Start: 2024-05-09

## 2024-05-09 NOTE — TELEPHONE ENCOUNTER
Dr CRUMP, Pt requesting to increase dose of Lexapro, currently taking 5mg. States she is having a hard time getting out of bed, therapist thinks it is due to Mother's Day. Do you prefer a VV to discuss?  LOV with Dano Birmingham MD , 3/1/2024

## 2024-07-07 RX ORDER — ESCITALOPRAM OXALATE 10 MG/1
10 TABLET ORAL DAILY
Qty: 90 TABLET | Refills: 2 | Status: SHIPPED | OUTPATIENT
Start: 2024-07-07

## 2024-07-07 NOTE — TELEPHONE ENCOUNTER
Refill Routing Note   Medication(s) are not appropriate for processing by [facility] for the following reason(s):        New or recently adjusted medication    [facility] action(s):  Defer               Appointments  past 12m or future 3m with PCP    Date Provider   Last Visit   3/1/2024 Dano Birmingham MD   Next Visit   Visit date not found Dano Birmingham MD   ED visits in past 90 days: 0        Note composed:11:55 AM 07/07/2024

## 2024-07-07 NOTE — TELEPHONE ENCOUNTER
No care due was identified.  [facility] Embedded Care Due Messages. Reference number: [id number].   7/07/2024 9:50:51 AM CDT

## 2024-10-09 ENCOUNTER — PATIENT MESSAGE (OUTPATIENT)
Dept: PRIMARY CARE CLINIC | Facility: CLINIC | Age: 24
End: 2024-10-09
Payer: COMMERCIAL
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
## 2024-10-16 ENCOUNTER — PATIENT MESSAGE (OUTPATIENT)
Dept: PRIMARY CARE CLINIC | Facility: CLINIC | Age: 24
End: 2024-10-16
Payer: COMMERCIAL

## 2024-10-17 ENCOUNTER — OFFICE VISIT (OUTPATIENT)
Dept: PRIMARY CARE CLINIC | Facility: CLINIC | Age: 24
End: 2024-10-17
Payer: COMMERCIAL

## 2024-10-17 DIAGNOSIS — F43.21 ADJUSTMENT DISORDER WITH DEPRESSED MOOD: Primary | ICD-10-CM

## 2024-10-17 RX ORDER — ESCITALOPRAM OXALATE 20 MG/1
20 TABLET ORAL DAILY
Qty: 30 TABLET | Refills: 3 | Status: SHIPPED | OUTPATIENT
Start: 2024-10-17

## 2025-02-25 RX ORDER — ESCITALOPRAM OXALATE 20 MG/1
20 TABLET ORAL
Qty: 90 TABLET | Refills: 2 | Status: SHIPPED | OUTPATIENT
Start: 2025-02-25

## 2025-02-25 NOTE — TELEPHONE ENCOUNTER
Refill Decision Note   Marilyn Salazar  is requesting a refill authorization.  Brief Assessment and Rationale for Refill:  Approve     Medication Therapy Plan:         Comments:     Note composed:2:58 AM 02/25/2025

## 2025-02-28 ENCOUNTER — OFFICE VISIT (OUTPATIENT)
Dept: OTOLARYNGOLOGY | Facility: CLINIC | Age: 25
End: 2025-02-28
Payer: COMMERCIAL

## 2025-02-28 VITALS
WEIGHT: 156.94 LBS | HEART RATE: 106 BPM | DIASTOLIC BLOOD PRESSURE: 80 MMHG | BODY MASS INDEX: 29.66 KG/M2 | SYSTOLIC BLOOD PRESSURE: 140 MMHG

## 2025-02-28 DIAGNOSIS — Z86.69 HX OF MIGRAINE HEADACHES: ICD-10-CM

## 2025-02-28 DIAGNOSIS — R42 DIZZINESS: ICD-10-CM

## 2025-02-28 DIAGNOSIS — H61.23 BILATERAL IMPACTED CERUMEN: Primary | ICD-10-CM

## 2025-02-28 PROCEDURE — 99999 PR PBB SHADOW E&M-EST. PATIENT-LVL III: CPT | Mod: PBBFAC,,, | Performed by: NURSE PRACTITIONER

## 2025-02-28 NOTE — PROGRESS NOTES
Patient ID: Marilyn Salazar is a 24 y.o. y.o. female    Chief Complaint:   Chief Complaint   Patient presents with    Dizziness     Off and on for over a month     blocked ears     Both ears feels it may be wax        Patient is self-referred for evaluation of a possible wax impaction in bilateral ears.  she has complaints of hearing loss in the affected ears, but denies pain or drainage.  This has been an issue in the past.  The patient has not been using any sort of ear drop to soften the wax. Denies hearing loss.   States dizziness on and off. Last episode was over a week ago. Describes as spinning sensation that lasts a second or so. She also c/o migraine headaches.       Review of Systems   Constitutional: Negative for fever, chills, fatigue and unexpected weight change.   HENT: Positive for ear blockage. Negative for hearing loss, nosebleeds, congestion, sore throat, facial swelling, rhinorrhea, sneezing, trouble swallowing, dental problem, voice change, postnasal drip, sinus pressure, tinnitus and ear discharge.    Eyes: Negative for redness, itching and visual disturbance.   Respiratory: Negative for cough, choking and wheezing.    Cardiovascular: Negative for chest pain and palpitations.   Gastrointestinal: Negative for abdominal pain.        No reflux.   Musculoskeletal: Negative for gait problem.   Skin: Negative for rash.   Neurological: Negative for dizziness, light-headedness and headaches.     Past Medical History:   Diagnosis Date    Kidney stone     Migraines     Urinary tract infection     Vaginal infection      Past Surgical History:   Procedure Laterality Date    ANKLE SURGERY      left ankle    BALLOON DILATION OF URETER Left 5/7/2020    Procedure: DILATION, URETER, USING BALLOON;  Surgeon: Anoop Aguilar MD;  Location: [facility];  Service: Urology;  Laterality: Left;    CYSTOSCOPY      CYSTOURETEROSCOPY WITH RETROGRADE PYELOGRAPHY AND INSERTION OF STENT INTO URETER Left 5/7/2020     Procedure: CYSTOURETEROSCOPY, WITH RETROGRADE PYELOGRAM AND URETERAL STENT INSERTION;  Surgeon: Anoop Aguilar MD;  Location: On license of [facility] OR;  Service: Urology;  Laterality: Left;    ESOPHAGOGASTRODUODENOSCOPY N/A 11/25/2019    Procedure: EGD (ESOPHAGOGASTRODUODENOSCOPY);  Surgeon: Chino Whelan MD;  Location: [facility] ENDO;  Service: Colon and Rectal;  Laterality: N/A;    KNEE ARTHROSCOPY Left 2/5/2015    Dr. Barrios     URETEROSCOPIC REMOVAL OF URETERIC CALCULUS Left 5/7/2020    Procedure: REMOVAL, CALCULUS, URETER, URETEROSCOPIC;  Surgeon: Anoop Aguilar MD;  Location: On license of [facility] OR;  Service: Urology;  Laterality: Left;     Social History[1]  Family History   Problem Relation Name Age of Onset    Hypertension Mother      Heart failure Mother      Arthritis Mother      Diabetes Father      No Known Problems Sister 2     Cancer Paternal Grandfather      Kidney disease Neg Hx         Objective:      Vitals:    02/28/25 1423   BP: (!) 140/80   Pulse: 106       Physical Exam   Constitutional: Well appearing / communicating without difficutly.  NAD.  Eyes: EOM I Bilaterally  Head/Face: Normocephalic. Negative paranasal sinus pressure/tenderness.  Salivary glands WNL.  House Brackmann I Bilaterally.     Right Ear: Auricle normal appearance. External Auditory Canal with cerumen impaction. EAC with no lesions or masses,TM w/o masses/lesions/perforations. TM mobility noted.   Left Ear: Auricle normal appearance. External Auditory Canal with cerumen impaction. EAC with no lesions or masses,TM w/o masses/lesions/perforations. TM mobility noted.  Nose: No gross nasal septal deviation. Inferior Turbinates 3+ bilaterally. No septal perforation. No masses/lesions. External nasal skin appears normal without masses/lesions.   Oral Cavity: Gingiva/lips within normal limits.  Dentition/gingiva healthy appearing. Mucus membranes moist. Floor of mouth soft, no masses palpated. Oral Tongue mobile. Hard Palate appears normal.    Oropharynx: Base of  tongue appears normal. No masses/lesions noted. Tonsillar fossa/pharyngeal wall without lesions. Posterior oropharynx WNL.  Soft palate without masses. Midline uvula.   Neck/Lymphatic: No LAD I-VI bilaterally.  No thyromegaly.  No masses noted on exam.     Mirror laryngoscopy/nasopharyngoscopy: Active gag reflex.  Unable to perform.     Neuro/Psychiatric: AOx3.  Normal mood and affect.   Cardiovascular: Normal carotid pulses bilaterally, no increasing jugular venous distention noted at cervical region bilaterally.    Respiratory: Normal respiratory effort, no stridor, no retractions noted.      Cerumen removal under binocular microscopy   Ear Cerumen Removal     Date/Time: 2/28/2025 2:20 PM     Performed by: Hannah Bonds NP  Authorized by: Hannah Bonds NP    Consent Done?:  Yes (Verbal)     Local anesthetic:  None  Location details:  Both ears  Procedure type: curette    Procedure type comment:  Suction  Cerumen  Removal Results:  Cerumen completely removed  Patient tolerance:  Patient tolerated the procedure well with no immediate complications          Assessment:         ICD-10-CM ICD-9-CM    1. Bilateral impacted cerumen  H61.23 380.4 Ear Cerumen Removal      2. Hx of migraine headaches  Z86.69 V12.49 Ambulatory referral/consult to Neurology      3. Dizziness  R42 780.4            Plan:       -   Cerumen impaction:  Removed under microscopy today without difficulty.  I would recommend the use of a wax softening drop, either over the counter Debrox or mineral oil, on a weekly basis.  I also instructed the patient to avoid Qtips.  -reports hearing improved. Declines audiogram today  -Based on HPI, dizziness is most likely BPPV vs vestibular migraine. She does not want workup for dizziness at this time.   - referral to neurology for headaches  -follow up 1 year or sooner as needed      Hannah Bonds NP    Answers submitted by the patient for this visit:  Review of Symptoms Questionnaire  (Submitted on  2/21/2025)  None of these: Yes  ear pain: Yes  None of these : Yes  None of these: Yes  None of these : Yes  None of these: Yes  None of these: Yes  None of these: Yes  None of these : Yes  None of these: Yes  None of these : Yes  None of these: Yes  None of these: Yes  nervous/ anxious: Yes         [1]   Social History  Socioeconomic History    Marital status: Single   Tobacco Use    Smoking status: Never    Smokeless tobacco: Never   Substance and Sexual Activity    Alcohol use: Never    Drug use: Never    Sexual activity: Yes     Partners: Male     Birth control/protection: None     Social Drivers of Health     Financial Resource Strain: Low Risk  (1/12/2024)    Overall Financial Resource Strain (CARDIA)     Difficulty of Paying Living Expenses: Not hard at all   Food Insecurity: Food Insecurity Present (1/12/2024)    Hunger Vital Sign     Worried About Running Out of Food in the Last Year: Sometimes true     Ran Out of Food in the Last Year: Never true   Transportation Needs: No Transportation Needs (1/12/2024)    PRAPARE - Transportation     Lack of Transportation (Medical): No     Lack of Transportation (Non-Medical): No   Physical Activity: Sufficiently Active (1/12/2024)    Exercise Vital Sign     Days of Exercise per Week: 3 days     Minutes of Exercise per Session: 90 min   Stress: Stress Concern Present (1/12/2024)    [employer] [place] [employer] - Occupational Stress Questionnaire     Feeling of Stress : Very much   Housing Stability: Low Risk  (1/12/2024)    Housing Stability Vital Sign     Unable to Pay for Housing in the Last Year: No     Number of Places Lived in the Last Year: 1     Unstable Housing in the Last Year: No

## 2025-02-28 NOTE — PROCEDURES
Ear Cerumen Removal    Date/Time: 2/28/2025 2:20 PM    Performed by: Hannah Bonds NP  Authorized by: Hannah Bonds NP    Consent Done?:  Yes (Verbal)    Local anesthetic:  None  Location details:  Both ears  Procedure type: curette    Procedure type comment:  Suction  Cerumen  Removal Results:  Cerumen completely removed  Patient tolerance:  Patient tolerated the procedure well with no immediate complications

## 2025-02-28 NOTE — PATIENT INSTRUCTIONS
Dizziness and Vertigo     Dizziness is a feeling that may be hard to describe, but often includes a feeling that you are spinning or tilting, or that you are about to fall or pass out. Dizziness can also cause you to feel lightheaded or giddy, or have difficulty walking straight.     Many people who feel dizzy have vertigo, a specific type of dizziness. Vertigo is a sense of spinning dizziness, swaying, or tilting. You may feel that you are moving or that the room is moving around you. Vertigo can be caused by a number of different problems involving the inner ear or brain. Some of these problems are not serious while others can be life threatening.     Causes of Vertigo     Benign paroxysmal positional vertigo (BPPV) - BPPV, sometimes called benign positional vertigo, positional vertigo, postural vertigo, or simply vertigo, is a type of vertigo that develops due to collections of calcium in the inner ear. These collections are called canaliths. Moving the canaliths (called canalith repositioning) is a common treatment for BPPV.     Vertigo is typically brief in people with BPPV, lasting seconds to minutes. Vertigo can be triggered by moving the head in certain ways. Many forms of vertigo are worsened by head movements. In BPPV it is CAUSED by movement.     Meniere disease - Meniere disease is a condition that causes repeated spells of vertigo, hearing loss, and ringing in the ears. Spells can last several minutes or hours. It is probably caused by a buildup of fluid in the inner ear.      Vestibular neuritis - Vestibular neuritis, or labyrinthitis, is probably caused by a virus that causes swelling around the balance nerve. People with vestibular neuritis develop sudden, severe vertigo, nausea, vomiting, and difficulty walking or standing up; these problems can last several days. Some people also develop difficulty hearing in one ear (labyrinthitis).     Head injury - Head injuries can affect the vestibular system  in a variety of ways, and lead to vertigo.      Medications - Other medications can affect the function of the inner ear or brain and lead to vertigo. Rarely, some medications can actually damage the inner ear.     Migraines - In a condition called vestibular migraine or migrainous vertigo, vertigo can be caused by a migraine. This type of vertigo usually happens along with a headache, but some patients have migraines without headache.      Other brain problems - Stroke or TIA (transient ischemic attack), bleeding in the brain, or multiple sclerosis can also cause vertigo. There are usually other symptoms, besides vertigo, that happen with these brain problems.     Non-vestibular causes of dizziness and imbalance     Around one in four people experience dizziness. Although it becomes more common with age, it can occur for a variety of reasons. People with a vestibular disorder may also experience dizziness due to other causes. Below is a list of some of the possible non-vestibular causes of dizziness. If you are experiencing symptoms of dizziness it is important to see a qualified health professional to find out the cause of your symptoms and for advice, treatment and referral to a specialist if necessary.     Side effects of medications  Some medications list dizziness as a side effect. If you take more than four different types of medication, this may also cause dizziness and increase your risk of falling.     Stress, tiredness and concentration  If you feel particularly stressed, anxious, angry or fearful, your heart rate increases and your breathing gets faster as blood is pumped to your muscles. A side effect of this is that you may feel sick or dizzy, since breathing too fast (hyperventilation) causes you to take in too much oxygen, which can make you dizzy. If you are tired or doing things you have to think about, this can affect balance.     Aging  Around one in five people over 60 are affected by dizziness  and imbalance. As people get older, the parts of the body which help maintain balance, e.g. the muscles, eyes and vestibular system in the inner ear, begin to function less well. Seeing in bad light may become difficult and there may be a permanent feeling of unsteadiness. Dizziness, however, does not tend to result from age-related changes alone and it is more likely to occur in people with a disorder or as a side-effect to medication.     Restricted blood flow to the brain  Dizziness and unsteadiness can also be the result of conditions that cause a temporary decrease in the blood flow to your brain. These conditions include:     Low blood pressure after standing up  Postural or orthostatic hypotension is a condition in which your blood pressure briefly drops when you get up quickly from sitting or lying down, causing nausea, dizziness, unsteadiness or blackouts. This is common and can be caused by dehydration, standing still for too long, becoming too hot and not eating enough or regularly enough (hypoglycaemia).     Low blood pressure after eating  Postprandial hypotension is a condition in which after eating (particularly after a large, high carbohydrate meal), your body does not compensate for the increased blood flow to your digestive system, causing dizziness, unsteadiness, or blackouts.     Osteoarthritis  A condition that causes damage and swelling of the joints. If the neck joints are affected, the blood flow to the brain can become restricted, causing temporary dizziness when you turn your head.     Arteriosclerosis  A build up of fatty deposits and cholesterol makes arteries become hard and narrow, reducing blood flow to the brain, which can cause dizziness. Arteriosclerosis can be caused by high blood pressure, diabetes, and an unhealthy lifestyle, and can lead to heart disease, stroke and blood clots.     Neurological diseases  Dizziness and unsteadiness can also be the result of damage to the areas  of the brain that coordinate balance, e.g. in people who have MS, stroke, Parkinsons disease or brain tumours.     Lack of exercise  Regular exercise is important for maintaining flexibility and strength, which can help balance. The stronger and more flexible the muscles and joints are, the better your body will be able to deal with different surfaces and keep you balanced      Can Medication Help Me Feel Better?     The use of medication in treating vestibular disorders depends on whether the vestibular system dysfunction is in an initial or acute phase (lasting up to 5 days) or chronic phase (ongoing).     During the acute phase, and when other illnesses have been ruled out, medications that may be prescribed include vestibular suppressants to reduce motion sickness or anti-emetics to reduce nausea. Vestibular suppressants include three general drug classes: anticholinergics, antihistamines, and benzodiazepines. Examples of vestibular suppressants are meclizine and dimenhydinate (antihistamine-anticholinergics) and lorazepam and diazepam (benzodiazepines).     Other medications that may be prescribed are steroids (e.g., prednisone), antiviral drugs (e.g., acyclovir), or antibiotics (e.g., amoxicillin) if a middle ear infection is present. If nausea has been severe enough to cause excessive dehydration, intravenous fluids may be given.     During the chronic phase, symptoms must be actively experienced without interference in order for the brain to adjust, a process called vestibular compensation. Any medication that makes the brain sleepy, including all vestibular suppressants, can slow down or stop the process of compensation. Therefore, they are often not appropriate for long-term use. Physicians generally find that most patients who fail to compensate are either strictly avoiding certain movements, using vestibular suppressants daily, or both.      Julianehojuan carlos's Head Exercises     Cawthorne exercises are used to  reinforce the associations between your balance mechanism and your eyes. The following exercises may help decrease the sensation of unsteadiness for certain types of dizziness. Virtually all patients using these exercises will note improved balance, but it may take a few weeks. Just as Physical Therapy may increase strength, balance can also be strengthened by doing these exercises repeatedly. It is important to start slowly because quick head movements can make anyone lightheaded at first. Slowly increase the speed and duration of exercises as tolerated.     These exercises are to be carried out for 15 minutes twice a day, increasing to 30 minutes.     Eye Exercises:  Look up, then down - at first slowly, then quickly - 20 times  Look from one side to the other - at first slowly, then quickly - 20 times  Focus on finger at arm's length moving one foot closer and back again - 20 times     Head Exercises:  Bend head forward then backward with eyes open - slowly, later quickly - 20 times  Turn head from one side to other side - slowly, then quickly - 20 times  As dizziness decreases these exercises should be done with eyes closed.     Sitting:  While sitting shrug shoulders - 20 times  Turn shoulders to right, then to left - 20 times  Bend forward and  objects from ground and sit up - 20 times     Standing:  Change from sitting to standing and back again - 20 times with eyes open  Repeat with eyes closed  Throw a small rubber ball from hand to hand above eye level.  Throw ball from hand to hand under one knee     Moving About:  Walk across room with eyes open, then closed - 10 times  Walk up and down a slope with eyes open, then closed - 10 times  Walk up and down steps with eyes open, then closed - 10 times  Any game involving stooping or turning is good

## 2025-03-02 ENCOUNTER — OFFICE VISIT (OUTPATIENT)
Dept: URGENT CARE | Facility: CLINIC | Age: 25
End: 2025-03-02
Payer: COMMERCIAL

## 2025-03-02 VITALS
DIASTOLIC BLOOD PRESSURE: 78 MMHG | TEMPERATURE: 99 F | BODY MASS INDEX: 29.27 KG/M2 | HEART RATE: 105 BPM | WEIGHT: 155 LBS | SYSTOLIC BLOOD PRESSURE: 121 MMHG | OXYGEN SATURATION: 99 % | HEIGHT: 61 IN | RESPIRATION RATE: 20 BRPM

## 2025-03-02 DIAGNOSIS — N89.8 VAGINAL DISCHARGE: Primary | ICD-10-CM

## 2025-03-02 DIAGNOSIS — R10.2 VAGINAL PAIN: ICD-10-CM

## 2025-03-02 DIAGNOSIS — R10.2 PELVIC PAIN: ICD-10-CM

## 2025-03-02 DIAGNOSIS — R82.90 MALODOROUS URINE: ICD-10-CM

## 2025-03-02 LAB
B-HCG UR QL: NEGATIVE
BILIRUBIN, UA POC OHS: NEGATIVE
BLOOD, UA POC OHS: ABNORMAL
CLARITY, UA POC OHS: CLEAR
COLOR, UA POC OHS: YELLOW
CTP QC/QA: YES
GLUCOSE, UA POC OHS: NEGATIVE
KETONES, UA POC OHS: NEGATIVE
LEUKOCYTES, UA POC OHS: ABNORMAL
NITRITE, UA POC OHS: NEGATIVE
PH, UA POC OHS: 7
PROTEIN, UA POC OHS: 100
SPECIFIC GRAVITY, UA POC OHS: 1.02
UROBILINOGEN, UA POC OHS: 0.2

## 2025-03-02 PROCEDURE — 81515 NFCT DS BV&VAGINITIS DNA ALG: CPT | Performed by: PHYSICIAN ASSISTANT

## 2025-03-02 PROCEDURE — 87591 N.GONORRHOEAE DNA AMP PROB: CPT | Performed by: PHYSICIAN ASSISTANT

## 2025-03-02 PROCEDURE — 87086 URINE CULTURE/COLONY COUNT: CPT | Performed by: PHYSICIAN ASSISTANT

## 2025-03-02 PROCEDURE — 99214 OFFICE O/P EST MOD 30 MIN: CPT | Mod: S$GLB,,, | Performed by: PHYSICIAN ASSISTANT

## 2025-03-02 PROCEDURE — 81003 URINALYSIS AUTO W/O SCOPE: CPT | Mod: QW,S$GLB,, | Performed by: PHYSICIAN ASSISTANT

## 2025-03-02 PROCEDURE — 81025 URINE PREGNANCY TEST: CPT | Mod: S$GLB,,, | Performed by: PHYSICIAN ASSISTANT

## 2025-03-02 RX ORDER — IBUPROFEN 800 MG/1
800 TABLET ORAL
Qty: 30 TABLET | Refills: 0 | Status: SHIPPED | OUTPATIENT
Start: 2025-03-02

## 2025-03-02 RX ORDER — METRONIDAZOLE 500 MG/1
500 TABLET ORAL EVERY 12 HOURS
Qty: 14 TABLET | Refills: 0 | Status: SHIPPED | OUTPATIENT
Start: 2025-03-02 | End: 2025-03-09

## 2025-03-02 NOTE — PROGRESS NOTES
"Subjective:      Patient ID: Marilyn Salazar is a 24 y.o. female.    Vitals:  height is 5' 1" (1.549 m) and weight is 70.3 kg (155 lb). Her oral temperature is 98.6 °F (37 °C). Her blood pressure is 121/78 and her pulse is 105. Her respiration is 20 and oxygen saturation is 99%.     Chief Complaint: Urinary Tract Infection    24 year old patient presents here today with c/o of vaginal pain, vaginal discharge and spasms in pelvic region x 3 days.  Pt states she was seen at outside  for these symptoms on 2/26 and dx with UTI. She was started on Nitrofurantoin but states symptoms have worsened since she started taking this. States she was having some malodorous urine at first but that has improved a little. Pain does not get worse with urination. Reports vaginal discharge is white with no abnormal odor.  Denies vaginal itching, dysuria, hematuria, urinary frequency, flank pain, n/v or fever. States she did have vaginal bleeding this morning but has decreased. Reports low concern for STD. Uses depo shot as contraceptive. She does have hx of  kidney stones, had surgically removed in 2020. No hx of ovarian cysts, fibroids or endometriosis.     Urinary Tract Infection   This is a new problem. The current episode started in the past 7 days. The problem occurs every urination. The problem has been unchanged. The quality of the pain is described as aching. The pain is at a severity of 7/10. The pain is moderate. There has been no fever. She is Sexually active. Associated symptoms include a discharge. Pertinent negatives include no behavior changes, chills, flank pain, frequency, hematuria, hesitancy, nausea, possible pregnancy, sweats, urgency, vomiting, weight loss, bubble bath use, constipation, rash or withholding. She has tried antibiotics for the symptoms. The treatment provided no relief. There is no history of catheterization, diabetes insipidus, diabetes mellitus, genitourinary reflux, hypertension, kidney " stones, recurrent UTIs, a single kidney, STD, urinary stasis or a urological procedure.   Vaginal Discharge  The patient's primary symptoms include pelvic pain (spasms), vaginal bleeding (once today) and vaginal discharge. The patient's pertinent negatives include no genital itching, genital lesions, genital odor, genital rash or missed menses. This is a new problem. The current episode started in the past 7 days. The problem has been unchanged. The pain is moderate. She is not pregnant. Pertinent negatives include no abdominal pain, back pain, chills, constipation, diarrhea, discolored urine, dysuria, fever, flank pain, frequency, hematuria, nausea, rash, urgency or vomiting. The vaginal discharge was white. She has been passing clots (small, less than dime size). She has not been passing tissue. Nothing aggravates the symptoms. She has tried antibiotics for the symptoms. The treatment provided no relief. She is sexually active. No, her partner does not have an STD. Her menstrual history has been irregular. Her past medical history is significant for vaginosis (BV years ago). There is no history of an ectopic pregnancy, endometriosis, ovarian cysts, PID or an STD.       Constitution: Negative for chills, sweating, fatigue and fever.   Gastrointestinal:  Negative for abdominal pain, nausea, vomiting, constipation and diarrhea.   Genitourinary:  Positive for history of kidney stones, irregular menstruation, vaginal pain, vaginal discharge, vaginal bleeding and pelvic pain (spasms). Negative for dysuria, frequency, urgency, urine decreased, flank pain, hematuria, missed menses, ovarian cysts, genital trauma, vaginal odor and genital sore.   Musculoskeletal:  Negative for back pain.   Skin:  Negative for rash.   Neurological: Negative.       Objective:     Physical Exam   Constitutional: She appears well-developed.  Non-toxic appearance. She does not appear ill. No distress.   HENT:   Head: Normocephalic and  atraumatic.   Ears:   Right Ear: External ear normal.   Left Ear: External ear normal.   Nose: Nose normal.   Eyes: Conjunctivae and EOM are normal.   Neck: Neck supple.   Pulmonary/Chest: Effort normal and breath sounds normal.   Abdominal: Normal appearance and bowel sounds are normal. She exhibits no distension. Soft. flat abdomen There is abdominal tenderness (mild ttp in RLQ). There is no rebound, no guarding, no tenderness at McBurney's point, no left CVA tenderness and no right CVA tenderness.   Musculoskeletal: Normal range of motion.         General: Normal range of motion.   Neurological: no focal deficit. She is alert. She displays no weakness. Gait normal.   Skin: Skin is warm, dry, not diaphoretic, not pale and no rash.   Psychiatric: Her behavior is normal.     Results for orders placed or performed in visit on 03/02/25   POCT Urinalysis(Instrument)    Collection Time: 03/02/25 11:57 AM   Result Value Ref Range    Color, POC UA Yellow Yellow, Straw, Colorless    Clarity, POC UA Clear Clear    Glucose, POC UA Negative Negative    Bilirubin, POC UA Negative Negative    Ketones, POC UA Negative Negative    Spec Grav POC UA 1.020 1.005 - 1.030    Blood, POC UA Moderate (A) Negative    pH, POC UA 7.0 5.0 - 8.0    Protein, POC  (A) Negative    Urobilinogen, POC UA 0.2 <=1.0    Nitrite, POC UA Negative Negative    WBC, POC UA Small (A) Negative   POCT urine pregnancy    Collection Time: 03/02/25 11:58 AM   Result Value Ref Range    POC Preg Test, Ur Negative Negative     Acceptable Yes        Assessment:     1. Vaginal discharge    2. Vaginal pain    3. Malodorous urine    4. Pelvic pain        Plan:       Vaginal discharge  -     Urine Culture High Risk  -     C. trachomatis/N. gonorrhoeae by AMP DNA [facility]; Vagina  -     Vaginosis Screen by DNA Probe    Vaginal pain  -     POCT Urinalysis(Instrument)  -     POCT urine pregnancy  -     Urine Culture High Risk  -     C. trachomatis/N.  gonorrhoeae by AMP DNA [facility]; Vagina  -     Vaginosis Screen by DNA Probe    Malodorous urine  -     POCT Urinalysis(Instrument)  -     POCT urine pregnancy  -     Urine Culture High Risk    Pelvic pain  -     POCT Urinalysis(Instrument)  -     POCT urine pregnancy  -     Urine Culture High Risk  -     C. trachomatis/N. gonorrhoeae by AMP DNA [facility]; Vagina  -     Vaginosis Screen by DNA Probe    Other orders  -     metroNIDAZOLE (FLAGYL) 500 MG tablet; Take 1 tablet (500 mg total) by mouth every 12 (twelve) hours. for 7 days  Dispense: 14 tablet; Refill: 0  -     ibuprofen (ADVIL,MOTRIN) 800 MG tablet; Take 1 tablet (800 mg total) by mouth 3 (three) times daily with meals. As needed for pain.  Dispense: 30 tablet; Refill: 0          Medical Decision Making:   History:   Old Medical Records: I decided to obtain old medical records.       <> Summary of Records: Reviewed previous treatment for kidney stones. Also reviewed UA and UPT results from recent UC visit on 2/26 which pt brought to clinic with her.   Differential Diagnosis:   UTI/cystitis, BV, yeast vaginitis, PID, STD, ectopic pregnancy, irregular menses, kidney stone.   Clinical Tests:   Lab Tests: Ordered and Reviewed  Urgent Care Management:  UA results reviewed.  Patient's main complaint is vaginal discharge with spasm like sensation and pelvic and vaginal region.  She denies any urinary complaints at this time besides malodorous urine which seems to have improved.  Clinical presentation more consistent with BV but will send urine culture to further assess for bladder infection.  Will also do vaginosis screening and gonorrhea and chlamydia testing.  Lower concern for kidney stone but did discussed doing KUB while in clinic but patient decided to hold off at this time.  Discussed starting treatment based on clinical presentation versus waiting until tests are resulted and patient would like to be started on treatment at this time.  Will discontinue  nitrofurantoin and have her start on Flagyl.  Patient advised that she will be notified with any abnormal test results or if any changes in treatment needs to be made.  Drink plenty of fluids.  Ibuprofen as needed for cramps/pain.  Close follow-up with OBGYN or return to clinic if any new or worsening symptoms prior to test resulting.

## 2025-03-03 LAB
BACTERIA UR CULT: NORMAL
BACTERIAL VAGINOSIS DNA: DETECTED
C TRACH DNA SPEC QL NAA+PROBE: NOT DETECTED
CANDIDA GLABRATA/KRUSEI: NOT DETECTED
CANDIDA RRNA VAG QL PROBE: NOT DETECTED
N GONORRHOEA DNA SPEC QL NAA+PROBE: NOT DETECTED
TRICHOMONAS VAGINALIS: NOT DETECTED

## 2025-03-04 ENCOUNTER — RESULTS FOLLOW-UP (OUTPATIENT)
Dept: URGENT CARE | Facility: CLINIC | Age: 25
End: 2025-03-04
Payer: COMMERCIAL

## 2025-03-04 NOTE — TELEPHONE ENCOUNTER
Patient returned my call. Discussed. She is taking the medication. She has an OBGYN to followup with.    Edne Sky MD  03/04/2025    Voicemail left for patient to return call to office. It is to discuss lab results. Positive for BV which was already placed on antibiotics for (Metronidazole). Also to see how she is doing.      Eden Sky MD  03/04/2025

## 2025-03-29 ENCOUNTER — OFFICE VISIT (OUTPATIENT)
Dept: URGENT CARE | Facility: CLINIC | Age: 25
End: 2025-03-29
Payer: COMMERCIAL

## 2025-03-29 VITALS
RESPIRATION RATE: 17 BRPM | DIASTOLIC BLOOD PRESSURE: 88 MMHG | OXYGEN SATURATION: 95 % | TEMPERATURE: 99 F | HEART RATE: 66 BPM | WEIGHT: 155 LBS | BODY MASS INDEX: 29.27 KG/M2 | HEIGHT: 61 IN | SYSTOLIC BLOOD PRESSURE: 131 MMHG

## 2025-03-29 DIAGNOSIS — S69.90XA JAMMED FINGER (INTERPHALANGEAL JOINT), INITIAL ENCOUNTER: ICD-10-CM

## 2025-03-29 DIAGNOSIS — M79.645 FINGER PAIN, LEFT: Primary | ICD-10-CM

## 2025-03-29 PROCEDURE — 99213 OFFICE O/P EST LOW 20 MIN: CPT | Mod: S$GLB,,,

## 2025-03-29 PROCEDURE — 73140 X-RAY EXAM OF FINGER(S): CPT | Mod: FY,LT,S$GLB, | Performed by: RADIOLOGY

## 2025-03-29 NOTE — PROGRESS NOTES
"Subjective:      Patient ID: Marilyn Salazar is a 24 y.o. female.    Vitals:  height is 5' 1" (1.549 m) and weight is 70.3 kg (154 lb 15.7 oz). Her oral temperature is 98.7 °F (37.1 °C). Her blood pressure is 131/88 and her pulse is 66. Her respiration is 17 and oxygen saturation is 95%.     Chief Complaint: finger injury    24-year-old female  presents to the clinic today with chief complaint of left middle finger injury. Symptoms started 3 weeks ago while playing basketball and jamming her finger and have not improved.   Denies any previous surgeries or injuries on affected area. Patient has iced area and placed finger in a splint as well as taken tylenol.  Pain is constant achy and throbbing. Patient notes bending her finger makes it worse and rest improves symptoms. Patient is right hand dominant. Denies any current erythema, abrasions, swelling, ecchymosis, or deformities. Denies numbness or tingling. Denies radiation of pain.  Denies fever, body aches, chest pain, shortness of breath, abdominal pain, N/V/D, or rashes.      Other  This is a new problem. The current episode started 1 to 4 weeks ago. The problem occurs constantly. The problem has been unchanged. Associated symptoms include arthralgias. Pertinent negatives include no abdominal pain, anorexia, change in bowel habit, chest pain, chills, fever, headaches, joint swelling, myalgias, nausea, neck pain, rash, swollen glands, urinary symptoms, visual change or vomiting. Exacerbated by: move or bend finger. She has tried acetaminophen for the symptoms. The treatment provided mild relief.       Constitution: Negative for activity change, chills and fever.   Neck: Negative for neck pain.   Cardiovascular:  Negative for chest pain.   Respiratory:  Negative for shortness of breath.    Gastrointestinal:  Negative for abdominal pain, nausea, vomiting and diarrhea.   Musculoskeletal:  Positive for joint pain. Negative for pain, trauma, joint swelling, " abnormal ROM of joint, arthritis, gout, back pain, muscle cramps, muscle ache and history of spine disorder.   Skin:  Negative for rash, abrasion, erythema and bruising.   Neurological:  Negative for headaches.   Psychiatric/Behavioral:  Negative for nervous/anxious. The patient is not nervous/anxious.       Objective:     Physical Exam   Constitutional: She is oriented to person, place, and time. She appears well-developed.   HENT:   Head: Normocephalic and atraumatic. Head is without abrasion, without contusion and without laceration.   Ears:   Right Ear: External ear normal.   Left Ear: External ear normal.   Nose: Nose normal.   Mouth/Throat: Oropharynx is clear and moist and mucous membranes are normal.   Eyes: Conjunctivae, EOM and lids are normal. Pupils are equal, round, and reactive to light.   Neck: Trachea normal and phonation normal. Neck supple.   Cardiovascular: Normal rate.   Pulmonary/Chest: Effort normal. No respiratory distress.   Musculoskeletal: Normal range of motion.         General: Normal range of motion.      Left hand: She exhibits normal capillary refill. Decreased sensation is not present in the ulnar distribution, is not present in the medial redistribution and is not present in the radial distribution. Left middle finger: Exhibits tenderness. There is tenderness of the MCP joint. Motor /Testing: Middle Finger: 5/5. Comments: No laxity noted on left middle finger ligaments.    Neurological: She is alert and oriented to person, place, and time.   Skin: Skin is warm, dry, intact and no rash. Capillary refill takes less than 2 seconds. No abrasion, No burn, No bruising, No erythema and No ecchymosis   Psychiatric: Her speech is normal and behavior is normal. Judgment and thought content normal.   Nursing note and vitals reviewed.      Assessment:     1. Finger pain, left    2. Jammed finger (interphalangeal joint), initial encounter      X-Ray Finger 2 or More Views Left  Result Date:  3/29/2025  EXAMINATION: XR FINGER 2 OR MORE VIEWS LEFT CLINICAL HISTORY: Pain in left finger(s) COMPARISON: None FINDINGS: Three views left 3rd digit. No acute displaced fracture or dislocation of the 3rd digit.  No radiopaque foreign body.     1. No acute displaced fracture or dislocation of the 3rd digit. Electronically signed by: Silviano Trinh MD Date:    03/29/2025 Time:    13:13        Plan:       Finger pain, left  -     X-Ray Finger 2 or More Views Left; Future; Expected date: 03/29/2025    Jammed finger (interphalangeal joint), initial encounter  -     Ambulatory referral/consult to Orthopedics

## 2025-03-29 NOTE — PATIENT INSTRUCTIONS
Please drink plenty of fluids.  Please get plenty of rest.  Please return here or go to the Emergency Department for any concerns or worsening of condition.  If you were prescribed a narcotic medication, do not drive or operate heavy equipment or machinery while taking these medications.  CALL [phone] TO SCHEDULE APPOINTMENT WITH SPECIALIST IF YOU HAVE NOT RECEIVED A CALL BY EITHER TODAY OR BY TOMORROW.     If you were not prescribed an anti-inflammatory medication, and if you do not have any history of stomach/intestinal ulcers, or kidney disease, or are not taking a blood thinner such as Coumadin, Plavix, Pradaxa, Eloquis, or Xaralta for example, it is OK to take over the counter Ibuprofen or Advil or Motrin or Aleve as directed.  Do not take these medications on an empty stomach.  Rest, ice, compression and elevation to the affected joint or limb as needed.  Please follow up with your primary care doctor or specialist as needed.    If you  smoke, please stop smoking.

## 2025-04-01 ENCOUNTER — PATIENT OUTREACH (OUTPATIENT)
Dept: ADMINISTRATIVE | Facility: HOSPITAL | Age: 25
End: 2025-04-01
Payer: COMMERCIAL

## 2025-04-07 ENCOUNTER — OFFICE VISIT (OUTPATIENT)
Dept: ORTHOPEDICS | Facility: CLINIC | Age: 25
End: 2025-04-07
Payer: COMMERCIAL

## 2025-04-07 DIAGNOSIS — S63.633A SPRAIN OF INTERPHALANGEAL JOINT OF LEFT MIDDLE FINGER, INITIAL ENCOUNTER: Primary | ICD-10-CM

## 2025-04-07 PROCEDURE — 99999 PR PBB SHADOW E&M-EST. PATIENT-LVL II: CPT | Mod: PBBFAC,,, | Performed by: SPECIALIST/TECHNOLOGIST

## 2025-04-07 PROCEDURE — 1159F MED LIST DOCD IN RCRD: CPT | Mod: CPTII,S$GLB,, | Performed by: SPECIALIST/TECHNOLOGIST

## 2025-04-07 PROCEDURE — 99204 OFFICE O/P NEW MOD 45 MIN: CPT | Mod: S$GLB,,, | Performed by: SPECIALIST/TECHNOLOGIST

## 2025-04-07 NOTE — PROGRESS NOTES
Patient ID: Marilyn Salazar is a 24 y.o. female.    Chief Complaint: Pain and Swelling of the Left Hand    History of Present Illness    CHIEF COMPLAINT:  - Right ring finger pain    HPI:  Marilyn presents with a complaint of a jammed left long finger that occurred approximately four weeks ago while playing basketball. She reports the injury happened when the ball impacted her finger during a pass. Pain is located on the sides and bottom of the affected finger. This is the longest-lasting jammed finger she has experienced, despite having had similar injuries in the past while playing sports. She is right-handed.    She denies any previous injuries to the wrist or hand.    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          Hand/Wrist Musculoskeletal Exam    Inspection    Right      Erythema: none      Ecchymosis: none      Edema: none      Deformity: none    Left      Erythema: none      Ecchymosis: none      Edema: none      Deformity: none    Palpation    Left      Middle tenderness to palpation: proximal interphalangeal joint    Range of Motion        Range of motion additional comments: Able to make a composite fist.    Neurovascular    Right       Radial pulse: normal      Capillary refill: brisk      Ulnar nerve sensory distribution: normal      Median nerve sensory distribution: normal      Superficial radial nerve sensory distribution: normal    Left       Radial pulse: normal      Capillary refill: brisk      Ulnar nerve sensory distribution: normal      Median nerve sensory distribution: normal      Superficial radial nerve sensory distribution: normal    Neurovascular additional comments:       Special Tests    Special tests additional comments: Pain with stress testing of the radial collateral ligament of the PIP joint of the left long finger.      Physical Exam    IMAGING:  - XR  Right Finger: No fractures           IMAGING  Left Middle finger XR  Personal interpretation of the XR reveals no signs of fractures or dislocations      PLAN  Assessment & Plan    24-year-old left long finger PIP joint sprain.  - Marilu taped patient's finger.  - Use marilu taping when playing sports.  - Use compression sleeve on injured finger.  - Continue moving the injured finger as much as possible to prevent stiffness.         Ham Harrington PA-C, ATC  Hand and Upper Extremity   [facility]    This note was generated with the assistance of ambient listening technology. Verbal consent was obtained by the patient and accompanying visitor(s) for the recording of patient appointment to facilitate this note. I attest to having reviewed and edited the generated note for accuracy, though some syntax or spelling errors may persist. Please contact the author of this note for any clarification.

## 2025-04-10 ENCOUNTER — PATIENT MESSAGE (OUTPATIENT)
Dept: PRIMARY CARE CLINIC | Facility: CLINIC | Age: 25
End: 2025-04-10
Payer: COMMERCIAL
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
## 2025-04-11 ENCOUNTER — PATIENT MESSAGE (OUTPATIENT)
Dept: PRIMARY CARE CLINIC | Facility: CLINIC | Age: 25
End: 2025-04-11

## 2025-04-11 ENCOUNTER — LAB VISIT (OUTPATIENT)
Dept: LAB | Facility: HOSPITAL | Age: 25
End: 2025-04-11
Attending: INTERNAL MEDICINE
Payer: COMMERCIAL

## 2025-04-11 ENCOUNTER — TELEPHONE (OUTPATIENT)
Dept: PRIMARY CARE CLINIC | Facility: CLINIC | Age: 25
End: 2025-04-11

## 2025-04-11 ENCOUNTER — RESULTS FOLLOW-UP (OUTPATIENT)
Dept: PRIMARY CARE CLINIC | Facility: CLINIC | Age: 25
End: 2025-04-11

## 2025-04-11 ENCOUNTER — OFFICE VISIT (OUTPATIENT)
Dept: PRIMARY CARE CLINIC | Facility: CLINIC | Age: 25
End: 2025-04-11
Payer: COMMERCIAL

## 2025-04-11 VITALS — BODY MASS INDEX: 28.32 KG/M2 | WEIGHT: 150 LBS | HEIGHT: 61 IN

## 2025-04-11 DIAGNOSIS — R30.0 DYSURIA: ICD-10-CM

## 2025-04-11 DIAGNOSIS — R30.0 DYSURIA: Primary | ICD-10-CM

## 2025-04-11 DIAGNOSIS — N20.0 NEPHROLITHIASIS: ICD-10-CM

## 2025-04-11 LAB
BACTERIA #/AREA URNS HPF: ABNORMAL /HPF
BILIRUB UR QL STRIP.AUTO: NEGATIVE
CLARITY UR: CLEAR
COLOR UR AUTO: YELLOW
GLUCOSE UR QL STRIP: NEGATIVE
HGB UR QL STRIP: ABNORMAL
KETONES UR QL STRIP: NEGATIVE
LEUKOCYTE ESTERASE UR QL STRIP: ABNORMAL
MICROSCOPIC COMMENT: ABNORMAL
NITRITE UR QL STRIP: NEGATIVE
PH UR STRIP: 6 [PH]
PROT UR QL STRIP: NEGATIVE
RBC #/AREA URNS HPF: 7 /HPF (ref 0–4)
SP GR UR STRIP: 1.01
UROBILINOGEN UR STRIP-ACNC: NEGATIVE EU/DL
WBC #/AREA URNS HPF: 25 /HPF (ref 0–5)
WBC CLUMPS URNS QL MICRO: ABNORMAL

## 2025-04-11 PROCEDURE — 87088 URINE BACTERIA CULTURE: CPT | Mod: PN

## 2025-04-11 PROCEDURE — 81003 URINALYSIS AUTO W/O SCOPE: CPT | Mod: PN

## 2025-04-11 RX ORDER — NITROFURANTOIN 25; 75 MG/1; MG/1
100 CAPSULE ORAL 2 TIMES DAILY
Qty: 14 CAPSULE | Refills: 0 | Status: SHIPPED | OUTPATIENT
Start: 2025-04-11

## 2025-04-11 NOTE — TELEPHONE ENCOUNTER
----- Message from Dano Birmingham MD sent at 4/11/2025  7:34 AM CDT -----  Regarding: urine  I put in urine orders at virtual visit this am- can you reach out to schedule those today -

## 2025-04-14 ENCOUNTER — TELEPHONE (OUTPATIENT)
Dept: PRIMARY CARE CLINIC | Facility: CLINIC | Age: 25
End: 2025-04-14
Payer: COMMERCIAL

## 2025-04-14 LAB — BACTERIA UR CULT: ABNORMAL

## 2025-04-14 RX ORDER — CIPROFLOXACIN 500 MG/1
500 TABLET ORAL EVERY 12 HOURS
Qty: 14 TABLET | Refills: 0 | Status: SHIPPED | OUTPATIENT
Start: 2025-04-14

## 2025-04-14 NOTE — TELEPHONE ENCOUNTER
----- Message from Dano Birmingham MD sent at 4/11/2025  9:00 PM CDT -----  Your urine had some red and white blood cells in it, and it might be consistent with a UTI.  I'll go ahead and send in some antibitoics while awaiting the culture results.    ----- Message -----  From: Lab, Background User  Sent: 4/11/2025   4:07 PM CDT  To: Dano Birmingham MD

## 2025-04-15 ENCOUNTER — TELEPHONE (OUTPATIENT)
Dept: PEDIATRICS | Facility: CLINIC | Age: 25
End: 2025-04-15
Payer: COMMERCIAL

## 2025-04-15 NOTE — TELEPHONE ENCOUNTER
----- Message from Dano Birmingham MD sent at 4/14/2025  9:27 PM CDT -----  The bacteria your urine grew was resistant to the antibitoic I prescribed, so I will change it to ciprofloxacin - I will send that to the pharmacy now    ----- Message -----  From: Lab, Background User  Sent: 4/11/2025   4:07 PM CDT  To: Dano Birmingham MD

## 2025-05-14 ENCOUNTER — E-VISIT (OUTPATIENT)
Dept: PRIMARY CARE CLINIC | Facility: CLINIC | Age: 25
End: 2025-05-14
Payer: COMMERCIAL

## 2025-05-14 DIAGNOSIS — F41.9 ANXIETY: Primary | ICD-10-CM

## 2025-05-14 RX ORDER — PROPRANOLOL HYDROCHLORIDE 10 MG/1
10 TABLET ORAL 3 TIMES DAILY PRN
Qty: 90 TABLET | Refills: 3 | Status: SHIPPED | OUTPATIENT
Start: 2025-05-14 | End: 2026-05-14

## 2025-05-14 NOTE — PROGRESS NOTES
"[facility] E-Visit Note      Reason for Visit:     Chief Complaint: General Illness (Entered automatically based on patient selection in [employer].)    The patient initiated a request through [employer] on 5/14/2025 for evaluation and management with a chief complaint of General Illness (Entered automatically based on patient selection in [employer].)     History of Present Illness:     I evaluated the questionnaire submission on 5/14/25.    [facility]Medication    5/14/2025 11:19 AM CDT - Filed by Patient   What do you need help with? Depression   Do you agree to participate in an E-Visit? Yes   If you have any of the following symptoms, please present to your local emergency room or call 911:  I acknowledge   Medication requests for narcotics will not be addressed via an E-Visit.  Please schedule an appointment. I acknowledge   Do you have any of the following pregnancy-related conditions? None   What is the main issue you would like addressed today? Changing my prescription   Fear of embarrassment causes me to avoid doing things or speaking to people. No   I avoid activities in which I am the center of attention. No   Being embarrassed or looking stupid are among my worst fears. No   I would like to address: Medication for Anxiety or Depression   By selecting "I understand," you acknowledge that the answers that you provide for this questionnaire may not be immediately viewed by your provider or your care team. If you are personally dealing with suicidal thoughts or a crisis, please consider contacting your provider's office.  If your provider is not available, please consider taking action by: calling 911 or the National Suicide Prevention Lifeline any day, any time at 1-388.827.6458 or texting SIGNS to 143905 for 24/7 anonymous, free crisis counseling. I understand   Over the last 2 weeks, how often have you been bothered by the following problems?   Little interest or pleasure in doing " things Nearly every day   Feeling down, depressed, or hopeless Nearly every day   PHQ-2 Score (range: 0 - 6) 6 (Further screening recommended)   Feeling nervous, anxious, on edge Nearly everyday   Not being able to stop or control worrying Several days   Worrying too much about different things Several days   Trouble relaxing Not at all   Being so restless that its hard to sit still Not at all   Becoming easily annoyed or irritable Nearly everyday   Feeling afraid as if something awful might happen Not at all   If you marked you are experiencing any of the aforementioned problems, how difficult have these made it for you to do your work, take care of things at home, or get along with other people? Very difficult   TOTAL SCORE: (range: 0 - 21) 8   Do you want to address a new or existing medication? I would like to start a new medication that I do not already take   What is the name of the medication that you would like to start? Propanol   Have you taken a similar medication in the past? Yes   What was the name of the similar medication? Lexapro   Why are you no longer on that medication? Side effects   List the side effects you had with the medication: Exhaustion   Have you stopped taking the medicine? Yes   Are you still having side effects? Yes   Do you need treatment for your side effects? Yes   Have you treated the symptoms with anything? Yes   What treatment did you use? Sleep   How effective was the treatment you tried for your side effects? Somewhat effective    What medical condition is the  medication intended to treat? Anxiety/depression   Are you able to take your vital signs? Yes   Systolic Blood Pressure: 130   Diastolic Blood Pressure: 90   Weight: 160   Height: 61   Pulse: 80   Temperature: 98.2   Respiration rate:    Pulse Oxygen: 98          Problem List:     Problem List[1]      Medications:     Current Medications[2]      Labs/Imaging/Testing:     No visits with results within 7 Day(s) from this  visit.   Latest known visit with results is:   Lab Visit on 04/11/2025   Component Date Value Ref Range Status    Color, UA 04/11/2025 Yellow  Straw, Noemi, Yellow, Light-Orange Final    Appearance, UA 04/11/2025 Clear  Clear Final    pH, UA 04/11/2025 6.0  5.0 - 8.0 Final    Spec Grav UA 04/11/2025 1.015  1.005 - 1.030 Final    Protein, UA 04/11/2025 Negative  Negative Final    Recommend a 24 hour urine protein or a urine protein/creatinine ratio if globulin induced proteinuria is clinically suspected.    Glucose, UA 04/11/2025 Negative  Negative Final    Ketones, UA 04/11/2025 Negative  Negative Final    Bilirubin, UA 04/11/2025 Negative  Negative Final    Blood, UA 04/11/2025 1+ (A)  Negative Final    Nitrites, UA 04/11/2025 Negative  Negative Final    Urobilinogen, UA 04/11/2025 Negative  <2.0 EU/dL Final    Leukocyte Esterase, UA 04/11/2025 1+ (A)  Negative Final    RBC, UA 04/11/2025 7 (H)  0 - 4 /HPF Final    WBC, UA 04/11/2025 25 (H)  0 - 5 /HPF Final    WBC Clumps, UA 04/11/2025 Rare  None, Rare Final    Bacteria, UA 04/11/2025 Few (A)  None, Rare, Occasional /HPF Final    Microscopic Comment 04/11/2025    Final    Other formed elements not mentioned in the report are not present in the microscopic examination.    Urine Culture 04/11/2025 >100,000 cfu/ml Klebsiella pneumoniae (A)   Final         Assessment and Plan:     1. Anxiety     Hi - I reviewed your evisit questionnaire and we can try starting propranolol for the anxiety symptoms.  You can take this up to 3 times per day.  If you have any side effects, or if this is ineffective, then please reach out to us to schedule an inperson or virtual visit to discuss further      E-Visit time Tracking     Day 1 Time (in minutes): 6    Total Time (in minutes): 6       Dano Birmingham MD  5/14/2025         [1]   Patient Active Problem List  Diagnosis    Hydronephrosis of left kidney    Nephrolithiasis    Migraine with aura and without status migrainosus, not  intractable   [2]   Current Outpatient Medications:     ciprofloxacin HCl (CIPRO) 500 MG tablet, Take 1 tablet (500 mg total) by mouth every 12 (twelve) hours., Disp: 14 tablet, Rfl: 0    EScitalopram oxalate (LEXAPRO) 20 MG tablet, TAKE 1 TABLET BY MOUTH EVERY DAY, Disp: 90 tablet, Rfl: 2    ibuprofen (ADVIL,MOTRIN) 800 MG tablet, Take 1 tablet (800 mg total) by mouth 3 (three) times daily with meals. As needed for pain. (Patient taking differently: Take 800 mg by mouth as needed. As needed for pain.), Disp: 30 tablet, Rfl: 0    medroxyPROGESTERone (DEPO-PROVERA) 150 mg/mL Syrg, Inject 150 mg into the muscle every 3 (three) months., Disp: , Rfl:     nitrofurantoin, macrocrystal-monohydrate, (MACROBID) 100 MG capsule, Take 1 capsule (100 mg total) by mouth 2 (two) times daily., Disp: 14 capsule, Rfl: 0    propranoloL (INDERAL) 10 MG tablet, Take 1 tablet (10 mg total) by mouth 3 (three) times daily as needed (anxiety)., Disp: 90 tablet, Rfl: 3    triamcinolone acetonide 0.1% (KENALOG) 0.1 % cream, AAA arms,neck, thighs bid x 1-2 weeks only prn (Patient taking differently: as needed. AAA arms,neck, thighs bid x 1-2 weeks only prn), Disp: 454 g, Rfl: 1
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
## 2025-06-25 ENCOUNTER — PATIENT MESSAGE (OUTPATIENT)
Dept: PRIMARY CARE CLINIC | Facility: CLINIC | Age: 25
End: 2025-06-25
Payer: COMMERCIAL

## 2025-06-26 ENCOUNTER — OFFICE VISIT (OUTPATIENT)
Dept: PRIMARY CARE CLINIC | Facility: CLINIC | Age: 25
End: 2025-06-26
Payer: COMMERCIAL

## 2025-06-26 VITALS — WEIGHT: 155 LBS | HEIGHT: 61 IN | BODY MASS INDEX: 29.27 KG/M2

## 2025-06-26 DIAGNOSIS — R61 HYPERHIDROSIS: Primary | ICD-10-CM

## 2025-06-26 DIAGNOSIS — R39.9 URINARY SYMPTOM OR SIGN: ICD-10-CM

## 2025-06-26 PROCEDURE — 1159F MED LIST DOCD IN RCRD: CPT | Mod: CPTII,95,, | Performed by: INTERNAL MEDICINE

## 2025-06-26 PROCEDURE — 98006 SYNCH AUDIO-VIDEO EST MOD 30: CPT | Mod: 95,,, | Performed by: INTERNAL MEDICINE
